# Patient Record
Sex: FEMALE | Race: WHITE | NOT HISPANIC OR LATINO | Employment: UNEMPLOYED | URBAN - METROPOLITAN AREA
[De-identification: names, ages, dates, MRNs, and addresses within clinical notes are randomized per-mention and may not be internally consistent; named-entity substitution may affect disease eponyms.]

---
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
## 2022-01-06 ENCOUNTER — SOCIAL WORK (OUTPATIENT)
Dept: BEHAVIORAL/MENTAL HEALTH CLINIC | Facility: CLINIC | Age: 22
End: 2022-01-06
Payer: COMMERCIAL

## 2022-01-06 DIAGNOSIS — F41.1 GENERALIZED ANXIETY DISORDER: ICD-10-CM

## 2022-01-06 DIAGNOSIS — F33.1 MODERATE EPISODE OF RECURRENT MAJOR DEPRESSIVE DISORDER (HCC): Primary | ICD-10-CM

## 2022-01-06 PROCEDURE — 90834 PSYTX W PT 45 MINUTES: CPT | Performed by: SOCIAL WORKER

## 2022-01-06 NOTE — BH TREATMENT PLAN
Simon Hay  2000         Date of Initial Treatment Plan: 5/12/2021   Date of Current Treatment Plan: 1/6/22     Treatment Plan Number 3      Strengths/Personal Resources for Self Care: good listener, empathetic to others, responsible       Diagnosis:   1  Moderate episode of recurrent major depressive disorder ([facility] [address] )      2  Anxiety disorder, unspecified type            Area of Needs: improving self-regard, improving ability to cope with depression and anxiety         Long Term Goal 1: "I just want to feel normal "     Target Date: 4/6/22  Completion Date: TBD         Short Term Objectives for Goal 1:               G  Severiano Gouty will explore self during therapy sessions  ( ongoing as of 1/6/22)              B  Severiano Gouty will identify changes to improve self-regard  (achieved, ongoing as of 1/6/22)              C  Severiano Gouty will implement changes to improve self-regard  (achieved, ongoing as of 1/6/22)     Long Term Goal 2: Severiano Gouty will utilize healthy coping skills to decrease depression and anxiety      Target Date: 4/6/22  Completion Date: TBD     Short Term Objectives for Goal 2:           A  Severiano Gouty will identify current methods of coping with depression and anxiety  (achieved as of 9/1/21)          B  Severiano Gouty will receive psycho-education on healthy coping skills  (achieved as of 1/6/22)          C  Severiano Gouty will implement learned skills to assist with decreasing depression and anxiety   (achieved as of 1/6/22; ongoing)     GOAL 1: Modality: Individual 4x per month   Completion Date TBD, Medication Management and The person(s) responsible for carrying out the plan is  James Miguel, Med Provider      GOAL 2: Modality: Individual 4x per month   Completion Date TBD, Medication Management and The person(s) responsible for carrying out the plan is  Myriam Therapist, Med Provider          [address] [place] [address]: Diagnosis and Treatment Plan explained to Myriam Miguel relates understanding diagnosis and is agreeable to Treatment Plan          Client Comments : Please share your thoughts, feelings, need and/or experiences regarding your treatment plan: "ok "    Treatment Plan done but not signed at time of office visit due to:  Plan reviewed by phone or in person  and verbal consent given due to Rich social chely

## 2022-01-06 NOTE — PSYCH
This note was not shared with the patient due to this is a psychotherapy note     Virtual Regular Visit    Verification of patient location:    Patient is located in the following state in which I hold an active license [place]      Assessment/Plan:    Problem List Items Addressed This Visit        Other    Anxiety disorder    Depression - Primary          Goals addressed in session: Goal 1 and Goal 2          Reason for visit is   Chief Complaint   Patient presents with    Virtual Regular Visit        Encounter provider Codey Jaramillo    Provider located at [address]  [address]  [place] [address]  [phone]      Recent Visits  No visits were found meeting these conditions  Showing recent visits within past 7 days and meeting all other requirements  Future Appointments  No visits were found meeting these conditions  Showing future appointments within next 150 days and meeting all other requirements       The patient was identified by name and date of birth  Corbin Crane was informed that this is a telemedicine visit and that the visit is being conducted through[employer] and patient was informed that this is a secure, HIPAA-compliant platform  She agrees to proceed     My office door was closed  No one else was in the room  She acknowledged consent and understanding of privacy and security of the video platform  The patient has agreed to participate and understands they can discontinue the visit at any time  Patient is aware this is a billable service  Psychotherapy Provided: Individual Psychotherapy 45 minutes     Length of time in session: 45 minutes, follow up in 1 week    Encounter Diagnosis     ICD-10-CM    1  Moderate episode of recurrent major depressive disorder (HCC)  F33 1    2   Generalized anxiety disorder  F41 1        Goals addressed in session: Goal 1 and Goal 2     Data: Sonido Tenorio reported that she spent a few days visiting her Aunt last week and enjoyed her time away from home  Brionna Reyess discussed some stress with friends about new year's dimitri  Brionna Majors reported that she has been slightly anxious about the upcoming semester of school, but believes she is managing the stress well  Brionna Laura discussed various thoughts and feelings related to family life, friendships and view of self  Writer actively listened as Brionna Keya shared and assisted with processing thoughts and feelings  Writer and Brionna Laura reviewed treatment plan for update  Brionnayoselin Laura was able to recognize progress toward goals but wants to continue with these goals in order to secure maintained progress  Assessment: Brionna Keya presented in a neutral mood with congruent affect  Brionna Keya was well engaged and oriented X4  Depression symptom present is irritability  Some anxiety present  Successful use of healthy coping skills  Thought content normal  Insight and judgement intact  SI/HI not reported or observed  Plan: Continue with individual psychotherapy  Current suicide risk : Low     Behavioral Health Treatment Plan [facility]: Diagnosis and Treatment Plan explained to Anayeli Jean relates understanding diagnosis and is agreeable to Treatment Plan  Yes     I spent 45 minutes directly with the patient during this visit    VIRTUAL VISIT [address] verbally agrees to participate in [employer]  Pt is aware that Virtual Care Services could be limited without vital signs or the ability to perform a full hands-on physical Chas Rader understands she or the provider may request at any time to terminate the video visit and request the patient to seek care or treatment in person

## 2022-01-13 ENCOUNTER — SOCIAL WORK (OUTPATIENT)
Dept: BEHAVIORAL/MENTAL HEALTH CLINIC | Facility: CLINIC | Age: 22
End: 2022-01-13
Payer: COMMERCIAL

## 2022-01-13 DIAGNOSIS — F33.1 MODERATE EPISODE OF RECURRENT MAJOR DEPRESSIVE DISORDER (HCC): Primary | ICD-10-CM

## 2022-01-13 DIAGNOSIS — F41.1 GENERALIZED ANXIETY DISORDER: ICD-10-CM

## 2022-01-13 PROCEDURE — 90834 PSYTX W PT 45 MINUTES: CPT | Performed by: SOCIAL WORKER

## 2022-01-13 NOTE — PSYCH
This note was not shared with the patient due to this is a psychotherapy note    Virtual Regular Visit    Verification of patient location:    Patient is located in the following state in which I hold an active license [place]      Assessment/Plan:    Problem List Items Addressed This Visit        Other    Anxiety disorder    Depression - Primary          Goals addressed in session: Goal 1 and Goal 2          Reason for visit is   Chief Complaint   Patient presents with    Virtual Regular Visit        Encounter provider Jordyn Carvajal    Provider located at [address]  [address]  [place] [address]  [phone]      Recent Visits  No visits were found meeting these conditions  Showing recent visits within past 7 days and meeting all other requirements  Future Appointments  No visits were found meeting these conditions  Showing future appointments within next 150 days and meeting all other requirements       The patient was identified by name and date of birth  Rosa Peters was informed that this is a telemedicine visit and that the visit is being conducted through[employer] and patient was informed that this is a secure, HIPAA-compliant platform  She agrees to proceed     My office door was closed  No one else was in the room  She acknowledged consent and understanding of privacy and security of the video platform  The patient has agreed to participate and understands they can discontinue the visit at any time  Patient is aware this is a billable service  Psychotherapy Provided: Individual Psychotherapy 45 minutes     Length of time in session: 45 minutes, follow up in 1 week    Encounter Diagnosis     ICD-10-CM    1  Moderate episode of recurrent major depressive disorder (HCC)  F33 1    2   Generalized anxiety disorder  F41 1        Goals addressed in session: Goal 1 and Goal 2     Data: Don Prude reported that she met with her doctor regarding her knee and learned that she will likely need surgery  Patric Chavez reported that her doctor informed her that he is retiring and referred her to a new doctor  Patric Chavez discussed her thoughts and feelings regarding this news which she identified as a source of some stress and anxiety  Patric Chavez went on to report that she has not been working and is unsure if she will need to take a leave of absence due to her knee pain  Patric Chavez reported that she has been feeling somewhat depressed as she has been home a lot and feeling isolated  Patric Chavez discussed feeling anxiety when thinking about going out after spending so much time at home  Writer actively listened as Patric Chavez shared and assisted by exploring and processing thoughts and feelings  Writer and Patric Chavez discussed the value of staying in the present, positive self-talk and use of self-care and healthy coping to decrease stress  Assessment: Patric Chavez presented in a neutral mood with congruent affect  Patric Chavez was well engaged and oriented X4  Depression and anxiety symptoms are current increased due to recent stressors  Patric Chavez is actively utilizing healthy coping  Thought content was normal with some minor distorted cognitions  Insight and judgement are intact  SI/HI not reported or observed  Plan: Continue with individual psychotherapy  Current suicide risk : Low     Behavioral Health Treatment Plan [facility]: Diagnosis and Treatment Plan explained to Merlin Roman relates understanding diagnosis and is agreeable to Treatment Plan  Yes     I spent 45 minutes directly with the patient during this visit    VIRTUAL VISIT [address] verbally agrees to participate in [employer]   Pt is aware that Virtual Care Services could be limited without vital signs or the ability to perform a full hands-on physical Vallerie Aid understands she or the provider may request at any time to terminate the video visit and request the patient to seek care or treatment in person

## 2022-01-20 ENCOUNTER — TELEMEDICINE (OUTPATIENT)
Dept: BEHAVIORAL/MENTAL HEALTH CLINIC | Facility: CLINIC | Age: 22
End: 2022-01-20
Payer: COMMERCIAL

## 2022-01-20 DIAGNOSIS — F33.1 MODERATE EPISODE OF RECURRENT MAJOR DEPRESSIVE DISORDER (HCC): Primary | ICD-10-CM

## 2022-01-20 DIAGNOSIS — F41.1 GENERALIZED ANXIETY DISORDER: ICD-10-CM

## 2022-01-20 PROCEDURE — 90834 PSYTX W PT 45 MINUTES: CPT | Performed by: SOCIAL WORKER

## 2022-01-20 NOTE — PSYCH
This note was not shared with the patient due to this is a psychotherapy note    Virtual Regular Visit    Verification of patient location:    Patient is located in the following state in which I hold an active license [place]      Assessment/Plan:    Problem List Items Addressed This Visit        Other    Anxiety disorder    Depression - Primary          Goals addressed in session: Goal 1          Reason for visit is   Chief Complaint   Patient presents with    Virtual Regular Visit        Encounter provider Maxx Moreno    Provider located at [address]  [address]  [place] [address]  [phone]      Recent Visits  No visits were found meeting these conditions  Showing recent visits within past 7 days and meeting all other requirements  Today's Visits  Date Type Provider Dept   01/20/22 Telemedicine Maxx Moreno [facility]   Showing today's visits and meeting all other requirements  Future Appointments  No visits were found meeting these conditions  Showing future appointments within next 150 days and meeting all other requirements       The patient was identified by name and date of birth  Jenifer Singh was informed that this is a telemedicine visit and that the [facility] and patient was informed that this is a secure, HIPAA-compliant platform  She agrees to proceed     My office door was closed  No one else was in the room  She acknowledged consent and understanding of privacy and security of the video platform  The patient has agreed to participate and understands they can discontinue the visit at any time  Patient is aware this is a billable service  Psychotherapy Provided: Individual Psychotherapy 45 minutes     Length of time in session: 45 minutes, follow up in 1 week    Encounter Diagnosis     ICD-10-CM    1   Moderate episode of recurrent major depressive disorder ([facility] [address] )  F33 1    2  Generalized anxiety disorder  F41 1        Goals addressed in session: Goal 1     Data: Letty Esquivel reported that she has been feeling isolated as she has not been out of her house much due to her knee injury  Letty Esquivel reported that she has not been to work as her doctor put her on light duty and her job is not able to accommodate light duty  Letty Esquivel reported that she has been considering trying to work short shifts, but expressed ambivalence toward this idea as she is unsure of how it will impact her physically  Letty Esquivel identified feeling challenged with decision making regarding work  Writer spent time exploring this with Letty Esquivel, identifying options and assisting her to work through the decision making process  Writer identified the need for communication from Letty Esquivel to her employer and possibly her doctor to be able to make an informed decision  Letty Esquivel went on to identify some feelings of depression, stating that she feels "stuck " Writer actively listened and emotionally validated Myriam  Writer challenged irrational and/or distorted thoughts and offered Myriam new perspective  Writer asked Letty Esquivel to utilize decision making skills to make a decision about work and to do at least one thing that she would enjoy doing by next session Bj Sanchez agreed  Assessment: Letty Esquivel presented in a neutral, slightly depressed, mood with congruent affect  Letty Esquivel was well engaged and oriented X4  Letty Esquivel identifies barriers and underlying causes to depression but is ambivalent to make change  Thought content was normal with some irrational thoughts  Insight and judgement are intact  SI/HI not reported or observed  Plan: Continue with individual psychotherapy       Current suicide risk : Low     Behavioral Health Treatment Plan [facility]: Diagnosis and Treatment Plan explained to Tamara Yin relates understanding diagnosis and is agreeable to Treatment Plan  Yes     I spent 45 minutes directly with the patient during this visit    VIRTUAL VISIT [address] verbally agrees to participate in [employer]  Pt is aware that Virtual Care Services could be limited without vital signs or the ability to perform a full hands-on physical Army Bigness understands she or the provider may request at any time to terminate the video visit and request the patient to seek care or treatment in person

## 2022-01-26 DIAGNOSIS — F41.9 ANXIETY: ICD-10-CM

## 2022-01-26 RX ORDER — ZOLPIDEM TARTRATE 10 MG/1
10 TABLET ORAL
Qty: 30 TABLET | Refills: 0 | Status: SHIPPED | OUTPATIENT
Start: 2022-01-26 | End: 2022-02-22 | Stop reason: SDUPTHER

## 2022-01-27 ENCOUNTER — TELEMEDICINE (OUTPATIENT)
Dept: BEHAVIORAL/MENTAL HEALTH CLINIC | Facility: CLINIC | Age: 22
End: 2022-01-27
Payer: COMMERCIAL

## 2022-01-27 DIAGNOSIS — F33.1 MODERATE EPISODE OF RECURRENT MAJOR DEPRESSIVE DISORDER (HCC): Primary | ICD-10-CM

## 2022-01-27 DIAGNOSIS — F41.1 GENERALIZED ANXIETY DISORDER: ICD-10-CM

## 2022-01-27 PROCEDURE — 90834 PSYTX W PT 45 MINUTES: CPT | Performed by: SOCIAL WORKER

## 2022-01-27 NOTE — PSYCH
This note was not shared with the patient due to this is a psychotherapy note    Virtual Regular Visit    Verification of patient location:    Patient is located in the following state in which I hold an active license [place]      Assessment/Plan:    Problem List Items Addressed This Visit        Other    Anxiety disorder    Depression - Primary          Goals addressed in session: Goal 1 and Goal 2          Reason for visit is   Chief Complaint   Patient presents with    Virtual Regular Visit        Encounter provider Mena [employer]    Provider located at [address] [place] [address]  [address] [place] [address]  [phone]      Recent Visits  Date Type Provider Dept   01/20/22 [address] [place] [address] [facility]   Showing recent visits within past 7 days and meeting all other requirements  Today's Visits  Date Type Provider Dept   01/27/22 Telemedicine Mena [employer]  [facility]   Showing today's visits and meeting all other requirements  Future Appointments  No visits were found meeting these conditions  Showing future appointments within next 150 days and meeting all other requirements       The patient was identified by name and date of birth  Corrine Cortez was informed that this is a telemedicine visit and that the visit is being conducted through[facility] and patient was informed that this is a secure, HIPAA-compliant platform  She agrees to proceed     My office door was closed  No one else was in the room  She acknowledged consent and understanding of privacy and security of the video platform  The patient has agreed to participate and understands they can discontinue the visit at any time  Patient is aware this is a billable service       Psychotherapy Provided: Individual Psychotherapy 45 minutes     Length of time in session: 45 minutes, follow up in 1 week    Encounter Diagnosis     ICD-10-CM    1  Moderate episode of recurrent major depressive disorder (HCC)  F33 1    2  Generalized anxiety disorder  F41 1        Goals addressed in session: Goal 1 and Goal 2     Data: Jes Payan reported that she has started all classes for her second semester at school and is feeling somewhat overwhelmed as she is taking more classes this semester than she has in the past  Jesfrancie Payan discussed her efforts to organize and utilize time management  Jes Payan reported that she has been proactive with assignments to decrease stress  Writer praised Jes Payan for her efforts  Jes Payan went on to discuss feelings of loneliness  Jes Payan discussed difficulty making friends with peers at school, as well as the challenges with spending time with her good friend due to differences in schedule  Writer and Jes Payan discussed ways to occupy her time to assist with preventing feelings of loneliness  Writer and Jes Payan discussed use of self-care to manage feelings of irritability due to stress from school  Writer encouraged continued effort toward treatment goals  Assessment: Jes Payan presented in a neutral mood with congruent affect  Jes Payan was well engaged and oriented X4  Jes Payan is experiencing increased irritability due to stress  Jes Payan is motivated to utilize healthy coping skills and self-care  Thought content was normal  Insight and judgement were intact  SI/HI not reported or observed  Plan: Continue with individual psychotherapy  Current suicide risk : Low     Behavioral Health Treatment Plan [facility]: Diagnosis and Treatment Plan explained to Julius David relates understanding diagnosis and is agreeable to Treatment Plan  Yes     I spent 45 minutes directly with the patient during this visit    VIRTUAL VISIT [address] verbally agrees to participate in [employer]   Pt is aware that Virtual Care Services could be limited without vital signs or the ability to perform a full hands-on physical Poli Pap understands she or the provider may request at any time to terminate the video visit and request the patient to seek care or treatment in person

## 2022-01-28 DIAGNOSIS — F33.1 MODERATE EPISODE OF RECURRENT MAJOR DEPRESSIVE DISORDER (HCC): ICD-10-CM

## 2022-01-28 RX ORDER — BUPROPION HYDROCHLORIDE 150 MG/1
150 TABLET ORAL DAILY
Qty: 30 TABLET | Refills: 3 | Status: SHIPPED | OUTPATIENT
Start: 2022-01-28 | End: 2022-05-23 | Stop reason: ALTCHOICE

## 2022-02-03 ENCOUNTER — TELEMEDICINE (OUTPATIENT)
Dept: BEHAVIORAL/MENTAL HEALTH CLINIC | Facility: CLINIC | Age: 22
End: 2022-02-03
Payer: COMMERCIAL

## 2022-02-03 DIAGNOSIS — F33.1 MODERATE EPISODE OF RECURRENT MAJOR DEPRESSIVE DISORDER (HCC): Primary | ICD-10-CM

## 2022-02-03 DIAGNOSIS — F41.1 GENERALIZED ANXIETY DISORDER: ICD-10-CM

## 2022-02-03 PROCEDURE — 90834 PSYTX W PT 45 MINUTES: CPT | Performed by: SOCIAL WORKER

## 2022-02-03 NOTE — PSYCH
This note was not shared with the patient due to this is a psychotherapy note    Virtual Regular Visit    Verification of patient location:    Patient is located in the following state in which I hold an active license [place]      Assessment/Plan:    Problem List Items Addressed This Visit        Other    Anxiety disorder    Depression - Primary          Goals addressed in session: Goal 1 and Goal 2          Reason for visit is   Chief Complaint   Patient presents with    Virtual Regular Visit        Encounter provider Samanta Victoria    Provider located at [address] [place] [address]  [address]  [place] [address]  [phone]      Recent Visits  Date Type Provider Dept   01/27/22 [address] [place] [address] [facility]   Showing recent visits within past 7 days and meeting all other requirements  Today's Visits  Date Type Provider Dept   02/03/22 Telemedicine Samanta Victoria  [facility]   Showing today's visits and meeting all other requirements  Future Appointments  No visits were found meeting these conditions  Showing future appointments within next 150 days and meeting all other requirements       The patient was identified by name and date of birth  Marty Ashford was informed that this is a telemedicine visit and that the visit is being conducted through[employer] and patient was informed that this is a secure, HIPAA-compliant platform  She agrees to proceed     My office door was closed  No one else was in the room  She acknowledged consent and understanding of privacy and security of the video platform  The patient has agreed to participate and understands they can discontinue the visit at any time  Patient is aware this is a billable service       Psychotherapy Provided: Individual Psychotherapy 45 minutes     Length of time in session: 45 minutes, follow up in 1 week    Encounter Diagnosis     ICD-10-CM    1  Moderate episode of recurrent major depressive disorder (HCC)  F33 1    2  Generalized anxiety disorder  F41 1        Goals addressed in session: Goal 1 and Goal 2     Data: Barbara Gallardo reported feeling depressed over the last week, likely due to challenges with school  Barbara Gallardo shared about some assignments that she did not do well on over the past week  Barbara Gallardo identified how this has caused her mood to feel low and unmotivated  Writer spent time exploring these challenges with Barbara Gallardo and how she can overcome them  Writer acknowledged the ease with which she could allow these challenges to become barriers and how to navigate through that with her thoughts and feelings  Barbara Gallardo went on to discuss thoughts and feelings related to the possibility of having to return to work soon  Barbara Gallardo reported feeling overwhelmed at the thought  Writer assisted Myriam with processing her thoughts and feelings and challenging distorted cognitions  Writer discussed the importance of balance and self-care, as well as accepting help from others when needed  Barbara Gallardo did report effort to cope by spending time with a friend recently  Writer praised Barbara Gallardo and encouraged continued effort toward healthy coping and self-care  Assessment: Barbara Gallardo presented in a depressed mood with congruent affect  Barbara Gallardo was well engaged and oriented X4  Increased stress has increased depressive symptoms for Myriam Gallardo offers healthy insight and self-awareness  Barbara Gallardo responds well to thought challenging and encouragement  SI/HI not reported or observed  Plan: Continue with individual psychotherapy  Current suicide risk : Low     Behavioral Health Treatment Plan [facility]: Diagnosis and Treatment Plan explained to Brenda Moore relates understanding diagnosis and is agreeable to Treatment Plan   Yes     I spent 45 minutes directly with the patient during this visit    VIRTUAL VISIT DISCLAIMER    Luis Felipe Keys verbally agrees to participate in [employer]  Pt is aware that Virtual Care Services could be limited without vital signs or the ability to perform a full hands-on physical Keyla Lopez understands she or the provider may request at any time to terminate the video visit and request the patient to seek care or treatment in person

## 2022-02-09 ENCOUNTER — TELEPHONE (OUTPATIENT)
Dept: PSYCHIATRY | Facility: CLINIC | Age: 22
End: 2022-02-09

## 2022-02-09 NOTE — TELEPHONE ENCOUNTER
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 [address] [place], [address] appt 2/22/22

## 2022-02-10 ENCOUNTER — TELEMEDICINE (OUTPATIENT)
Dept: BEHAVIORAL/MENTAL HEALTH CLINIC | Facility: CLINIC | Age: 22
End: 2022-02-10
Payer: COMMERCIAL

## 2022-02-10 DIAGNOSIS — F41.1 GENERALIZED ANXIETY DISORDER: ICD-10-CM

## 2022-02-10 DIAGNOSIS — F33.1 MODERATE EPISODE OF RECURRENT MAJOR DEPRESSIVE DISORDER (HCC): Primary | ICD-10-CM

## 2022-02-10 PROCEDURE — 90834 PSYTX W PT 45 MINUTES: CPT | Performed by: SOCIAL WORKER

## 2022-02-10 NOTE — PSYCH
This note was not shared with the patient due to this is a psychotherapy note    Virtual Regular Visit    Verification of patient location:    Patient is located in the following state in which I hold an active license [place]      Assessment/Plan:    Problem List Items Addressed This Visit        Other    Anxiety disorder    Depression - Primary          Goals addressed in session: Goal 1 and Goal 2          Reason for visit is   Chief Complaint   Patient presents with    Virtual Regular Visit        Encounter provider Lauren Catherine    Provider located at [address] [place] [address]  [address]  [place] [address]  [phone]      Recent Visits  Date Type Provider Dept   02/03/22 [address] [place] [address] [facility]   Showing recent visits within past 7 days and meeting all other requirements  Today's Visits  Date Type Provider Dept   02/10/22 Telemedicine Lauren Catherine [facility]   Showing today's visits and meeting all other requirements  Future Appointments  No visits were found meeting these conditions  Showing future appointments within next 150 days and meeting all other requirements       The patient was identified by name and date of birth  Kayleigh Arts was informed that this is a telemedicine visit and that the visit is being conducted [facility] and patient was informed that this is a secure, HIPAA-compliant platform  She agrees to proceed     My office door was closed  No one else was in the room  She acknowledged consent and understanding of privacy and security of the video platform  The patient has agreed to participate and understands they can discontinue the visit at any time  Patient is aware this is a billable service       Psychotherapy Provided: Individual Psychotherapy 45 minutes     Length of time in session: 45 minutes, follow up in 1 week    Encounter Diagnosis     ICD-10-CM    1  Moderate episode of recurrent major depressive disorder (HCC)  F33 1    2  Generalized anxiety disorder  F41 1        Goals addressed in session: Goal 1 and Goal 2     Data: Severiano Gouty reported that she has been experiencing increased depressive symptoms  Severiano Gouty reported feeling overwhelmed by school and very unmotivated  Severiano Gouty discussed some challenges with school  Severiano Gouty went on to share about days when depression is very significant, to the point that she begins to experience thoughts of wanting to self-harm and/or suicidal ideation  Severiano Gouty provided examples of desire to cut and thoughts of taking her whole bottle of pills  Severiano Gouty reported that when this happens, she will go talk to her mother as her mother is a significant support  Severiano Gouty reported that while she has the thoughts, she has no intent to act and feels in control of her actions  Severiano Gouty reported that she also uses distraction to help cope  Severiano Gouty reported that this happens 2-3 times a week  Writer acknowledged concern regarding Myriam's report and spent time exploring and processing Myriam's thoughts and feelings  Severiano Gouty reported taking medication as prescribed and stated that she has an appointment with Dr Jeannie Malin next week; Writer encouraged Severiano Gouty to speak to Dr Jeannie Malin about the severity of her depression  Writer verbally safety planned with Severiano Gouty  Writer also reiterated use of healthy coping skills and her support system  Assessment: Severiano Gouty presented in a depressed mood with congruent affect  Severiano Gouty was well engaged and oriented X4  Depression symptoms have increased and are presenting as severe at this time (low motivation, loss of pleasure, depressed mood, thoughts of self-harm/suicidal ideation)  Thought content was observed to be normal during session  Writer observes Severiano Gouty to have a good sense of self-awareness and impulse control   Passive SI reported  HI not reported or observed  Plan: Continue with individual psychotherapy  Current suicide risk : Low     Behavioral Health Treatment Plan [facility]: Diagnosis and Treatment Plan explained to Jet Salazar relates understanding diagnosis and is agreeable to Treatment Plan  Yes     I spent 45 minutes directly with the patient during this visit    VIRTUAL VISIT [address] verbally agrees to participate in [facility]  Pt is aware that Virtual Care Services could be limited without vital signs or the ability to perform a full hands-on physical Sadiq Shah understands she or the provider may request at any time to terminate the video visit and request the patient to seek care or treatment in person

## 2022-02-17 ENCOUNTER — TELEPHONE (OUTPATIENT)
Dept: BEHAVIORAL/MENTAL HEALTH CLINIC | Facility: CLINIC | Age: 22
End: 2022-02-17

## 2022-02-22 ENCOUNTER — TELEMEDICINE (OUTPATIENT)
Dept: PSYCHIATRY | Facility: CLINIC | Age: 22
End: 2022-02-22
Payer: COMMERCIAL

## 2022-02-22 DIAGNOSIS — F41.1 GENERALIZED ANXIETY DISORDER: ICD-10-CM

## 2022-02-22 DIAGNOSIS — F41.9 ANXIETY: ICD-10-CM

## 2022-02-22 DIAGNOSIS — R45.86 MOOD SWINGS: Primary | ICD-10-CM

## 2022-02-22 DIAGNOSIS — F42.4 DERMATILLOMANIA: ICD-10-CM

## 2022-02-22 DIAGNOSIS — F33.1 MODERATE EPISODE OF RECURRENT MAJOR DEPRESSIVE DISORDER (HCC): ICD-10-CM

## 2022-02-22 PROCEDURE — 90833 PSYTX W PT W E/M 30 MIN: CPT | Performed by: NURSE PRACTITIONER

## 2022-02-22 PROCEDURE — 99214 OFFICE O/P EST MOD 30 MIN: CPT | Performed by: NURSE PRACTITIONER

## 2022-02-22 RX ORDER — ZOLPIDEM TARTRATE 10 MG/1
10 TABLET ORAL
Qty: 30 TABLET | Refills: 0 | Status: SHIPPED | OUTPATIENT
Start: 2022-02-22 | End: 2022-03-21

## 2022-02-22 NOTE — PSYCH
Virtual Regular Visit    Problem List Items Addressed This Visit     None      Visit Diagnoses     Mood swings    -  Primary    Relevant Medications    Brexpiprazole (Rexulti) 2 MG tablet    Anxiety        Relevant Medications    zolpidem (AMBIEN) 10 mg tablet        Reason for visit is   Chief Complaint   Patient presents with    Anxiety    Depression    Mood Swings    Medication Management     Encounter provider Sam Barajas, PhD    Provider located at [address] [place] [address]  [address]  [place] [address]  [phone]    Recent Visits  No visits were found meeting these conditions  Showing recent visits within past 7 days and meeting all other requirements  Today's Visits  Date Type Provider Dept   02/22/22 Telemedicine Sam Barajas, PhD [facility] [place]   Showing today's visits and meeting all other requirements  Future Appointments  No visits were found meeting these conditions  Showing future appointments within next 150 days and meeting all other requirements       After connecting through [employer], the patient was identified by name and date of birth  Maryan Carroll was informed that this is a telemedicine visit and that the visit is being conducted through [address] [place] [address] Now and patient was informed that this is a secure, HIPAA-compliant platform  She agrees to proceed  which may not be secure and therefore, might not be HIPAA-compliant  My office door was closed  No one else was in the room  She acknowledged consent and understanding of privacy and security of the video platform  The patient has agreed to participate and understands they can discontinue the visit at any time  SUBJECTIVE:    Maryan Carroll is a 25 y o  female with a history of mood swings, anxiety, depression seen for medication management and mood evaluation  Roney reports needing knee and ACL surgery, has pain in leg   She is eating and sleeping  Reports "spurts of depression" denies triggers then mood changes to "normal" She distracts herself and this helps sometimes  Roney is social and has friends who are supportive, parents are supportive  She is achieving As and Bs in school  One stressor is her poor self esteem  Denies SI; however, has self harm thoughts, but has not acted upon them  She attends therapy weekly  Takes medication as prescribed and family are supportive      HPI ROS Appetite Changes and Sleep: normal appetite and normal energy level    Review Of Systems:     Mood Anxiety and Depression   Behavior Normal    Thought Content Disturbing Thoughts, Feelings   General Emotional Problems and Sleep Disturbances   Personality Normal   Other Psych Symptoms Normal   Constitutional As Noted in HPI   ENT As Noted in HPI   Cardiovascular As Noted in HPI   Respiratory As Noted in HPI   Gastrointestinal As Noted in HPI   Genitourinary As Noted in HPI   Musculoskeletal As Noted in HPI   Integumentary As Noted in HPI   Neurological As Noted in HPI   Endocrine Normal    Other Symptoms Normal        Substance Abuse History:    Social History     Substance and Sexual Activity   Drug Use No       Family Psychiatric History:     Family History   Problem Relation Age of Onset    Anxiety disorder Mother     Depression Mother     Asthma Mother     Crohn's disease Mother     Bipolar disorder Mother     Cervical cancer Mother     Hypertension Maternal Grandmother     Depression Maternal Grandfather     Breast cancer Family        Social History     Socioeconomic History    Marital status: Single     Spouse name: Not on file    Number of children: Not on file    Years of education: Not on file    Highest education level: Not on file   Occupational History    Not on file   Tobacco Use    Smoking status: Never Smoker    Smokeless tobacco: Never Used    Tobacco comment: vape daily   Vaping Use    Vaping Use: Every day    Substances: Nicotine, Flavoring   Substance and Sexual Activity    Alcohol use: No    Drug use: No    Sexual activity: Not on file   Other Topics Concern    Not on file   Social History Narrative    12th grade     Social Determinants of Health     Financial Resource Strain: Not on file   Food Insecurity: Not on file   Transportation Needs: Not on file   Physical Activity: Not on file   Stress: Not on file   Social Connections: Not on file   Intimate Partner Violence: Not on file   Housing Stability: Not on file       Past Medical History:   Diagnosis Date    Anxiety     Asthma     Depression     High triglycerides     total triglyceride 187 repeat normal 70    Psychiatric disorder        Past Surgical History:   Procedure Laterality Date    KNEE ARTHROSCOPY Right 2015    KNEE ARTHROSCOPY Left 2013    KNEE ARTHROSCOPY Left 2017    KNEE SURGERY      UPPER GASTROINTESTINAL ENDOSCOPY         Current Outpatient Medications   Medication Sig Dispense Refill    albuterol (PROAIR HFA) 90 mcg/act inhaler Inhale 1-2 puffs as needed  (Patient not taking: Reported on 12/21/2021 )      Blood Glucose Monitoring Suppl (OneTouch Verio) w/Device KIT Use to test blood sugar three times a day (Patient not taking: Reported on 12/21/2021 ) 1 kit 0    Brexpiprazole (Rexulti) 2 MG tablet Take 1 tablet (2 mg total) by mouth daily 30 tablet 3    buPROPion (WELLBUTRIN XL) 150 mg 24 hr tablet Take 1 tablet (150 mg total) by mouth daily 30 tablet 3    esomeprazole (NexIUM) 20 mg capsule Take 1 capsule (20 mg total) by mouth daily in the early morning 30 capsule 9    glucose blood (OneTouch Verio) test strip Test blood sugar three times a day (Patient not taking: Reported on 12/21/2021 ) 300 each 3    Lancets (onetouch ultrasoft) lancets Test blood sugar three times a day (Patient not taking: Reported on 12/21/2021 ) 300 each 3    meloxicam (MOBIC) 15 mg tablet       norgestimate-ethinyl estradiol (ORTHO-CYCLEN) 0 25-35 MG-MCG per tablet Take 1 tablet by mouth daily      zolpidem (AMBIEN) 10 mg tablet Take 1 tablet (10 mg total) by mouth daily at bedtime as needed for sleep 30 tablet 0     No current facility-administered medications for this visit  No Known Allergies    The following portions of the patient's history were reviewed and updated as appropriate: allergies, current medications, past family history, past medical history, past social history, past surgical history and problem list     OBJECTIVE:     Mental Status Examination:    Appearance calm and cooperative , adequate hygiene and grooming and good eye contact    Mood depressed and mood appropriate   Affect affect appropriate    Speech a normal rate   Thought Processes normal thought processes   Hallucinations no hallucinations present    Thought Content no delusions   Abnormal Thoughts no suicidal thoughts  and no homicidal thoughts    Orientation  oriented to person and place and time   Remote Memory short term memory intact and long term memory intact   Attention Span concentration intact   Intellect Appears to be of Average Intelligence   Insight Insight intact   Judgement judgment was intact   Muscle Strength Muscle strength and tone were normal   Language no difficulty naming common objects   Fund of Knowledge displays adequate knowledge of current events   Pain moderate to severe   Pain Scale 5       Laboratory Results: No results found for this or any previous visit  Assessment/Plan:       Diagnoses and all orders for this visit:    Mood swings  -     Brexpiprazole (Rexulti) 2 MG tablet; Take 1 tablet (2 mg total) by mouth daily    Anxiety  -     zolpidem (AMBIEN) 10 mg tablet;  Take 1 tablet (10 mg total) by mouth daily at bedtime as needed for sleep          Treatment Recommendations- Risks Benefits      Immediate Medical/Psychiatric/Psychotherapy Treatments and Any Precautions:  reviewed medication continue with treatment plan, increase Rexulti to 2 mg she agreed    Risks, Benefits And Possible Side Effects Of Medications:  {PSYCH RISK, BENEFITS AND POSSIBLE SIDE EFFECTS (Optional):08471    Controlled Medication Discussion: she is aware of safe use and storage of medication     Psychotherapy Provided: 19 min  Supportive therapy  Coping skills  Medication education  Mood assessment    Goals discussed in session: reduce depression       Treatment Plan:    Completed and signed during the session: Not applicable - Treatment Plan not due at this session enacted 5/10/2021, updated 11/9/2021        Josseline Subramanian, PhD 02/22/22

## 2022-02-24 ENCOUNTER — TELEMEDICINE (OUTPATIENT)
Dept: BEHAVIORAL/MENTAL HEALTH CLINIC | Facility: CLINIC | Age: 22
End: 2022-02-24
Payer: COMMERCIAL

## 2022-02-24 DIAGNOSIS — F33.1 MODERATE EPISODE OF RECURRENT MAJOR DEPRESSIVE DISORDER (HCC): Primary | ICD-10-CM

## 2022-02-24 DIAGNOSIS — F41.1 GENERALIZED ANXIETY DISORDER: ICD-10-CM

## 2022-02-24 PROCEDURE — 90834 PSYTX W PT 45 MINUTES: CPT | Performed by: SOCIAL WORKER

## 2022-02-24 NOTE — PSYCH
This note was not shared with the patient due to this is a psychotherapy note     Psychotherapy Provided: Individual Psychotherapy 45 minutes     Length of time in session: 45 minutes, follow up in 2 week    Encounter Diagnosis     ICD-10-CM    1  Moderate episode of recurrent major depressive disorder (HCC)  F33 1    2  Generalized anxiety disorder  F41 1        Goals addressed in session: Goal 1 and Goal 2     Data: Sonido Tenorio reported that she does have to have knee surgery which is scheduled for next week  Sonido Tenorio reported that she is unsure of what exactly will be done as the doctor will not know specifics until he operates  Sonido Tenorio reported that she does not feel too worried as she has had knee surgery before, but is slightly concerned about what all will need to be done during the surgery  Sonido Luzjoyce reported that she will be missing school for about a week and plans to contact her professors to learn how she can keep up with assignments while out  Sonido Tenorio reported that she is managing well with school, has a lot of homework, but is keeping up  Sonido Tenorio reported that medication was increased by Dr Bruno Valdivia and she is hopeful that this will help decrease depression and mood swings  Sonido Coby reported that she has some "crippling" days during which she experiences severe depression  Sonido Tenorio discussed feelings of frustration when this happens because she does not "want" to feel that way  Sonido Tenorio reported that she continues to have passive thoughts of self-harming which she is able to control  Sonido Tenorio reported use of distraction to assist with intrusive thoughts  Sonido Tenorio reported that she has noticed she will be happy and in a second her mood changes to depressed  Sonido Tenorio discussed this in detail  Sonido Tenorio also discussed having a negative view of her physical appearance  Writer spent time exploring and processing thoughts and feelings   and Sonido Tenorio discussed self-care and use of healthy coping skills  Writer and Sonido Tenorio discussed specific interventions to address negative thoughts  Assessment: Sonido Tenorio presented in a neutral mood with congruent affect  Sonido Tenorio was well engaged and oriented X4  Sonido Tenorio has started taking the increased dosage of medication this week  Depression symptoms, anxiety and mood swings occur daily based on Myriam's report  Thought content was normal, insight and judgement were intact  SI/HI not reported or observed  Plan: Continue with individual psychotherapy  Current suicide risk : Low     Behavioral Health Treatment Plan [facility]: Diagnosis and Treatment Plan explained to Edradha Harris relates understanding diagnosis and is agreeable to Treatment Plan   Yes

## 2022-03-10 ENCOUNTER — TELEMEDICINE (OUTPATIENT)
Dept: BEHAVIORAL/MENTAL HEALTH CLINIC | Facility: CLINIC | Age: 22
End: 2022-03-10
Payer: COMMERCIAL

## 2022-03-10 DIAGNOSIS — F41.1 GENERALIZED ANXIETY DISORDER: ICD-10-CM

## 2022-03-10 DIAGNOSIS — F33.1 MODERATE EPISODE OF RECURRENT MAJOR DEPRESSIVE DISORDER (HCC): Primary | ICD-10-CM

## 2022-03-10 PROCEDURE — 90834 PSYTX W PT 45 MINUTES: CPT | Performed by: SOCIAL WORKER

## 2022-03-10 NOTE — PSYCH
This note was not shared with the patient due to this is a psychotherapy note    Virtual Regular Visit    Verification of patient location:    Patient is located in the following state in which I hold an active license [place]      Assessment/Plan:    Problem List Items Addressed This Visit        Other    Anxiety disorder    Depression - Primary          Goals addressed in session: Goal 1          Reason for visit is   Chief Complaint   Patient presents with    Virtual Regular Visit        Encounter provider Kim Ross    Provider located at [address]  [address]  [place] [address]  [phone]      Recent Visits  No visits were found meeting these conditions  Showing recent visits within past 7 days and meeting all other requirements  Today's Visits  Date Type Provider Dept   03/10/22 [address] [place] [address] [facility]   Showing today's visits and meeting all other requirements  Future Appointments  No visits were found meeting these conditions  Showing future appointments within next 150 days and meeting all other requirements       The patient was identified by name and date of birth  Gerber Erm was informed that this is a telemedicine visit and that the [facility] and patient was informed that this is a secure, HIPAA-compliant platform  She agrees to proceed     My office door was closed  No one else was in the room  She acknowledged consent and understanding of privacy and security of the video platform  The patient has agreed to participate and understands they can discontinue the visit at any time  Patient is aware this is a billable service  Psychotherapy Provided: Individual Psychotherapy 45 minutes     Length of time in session: 45 minutes, follow up in 1 week    Encounter Diagnosis     ICD-10-CM    1   Moderate episode of recurrent major depressive disorder ([facility] [address] )  F33 1    2  Generalized anxiety disorder  F41 1        Goals addressed in session: Goal 1     Data: Sarahvanessagabriela Wise reported that she had knee surgery last week and she is now recovering  Pam Yousifamanda reported that she learned that she will need another, more significant surgery in the near future which causes her stress  Pam Wise reported that she is currently healing well and has been motivated toward physical therapy and recovery  Pam Wise identified stress related to keeping up with school work while recovering  Writer and Pam Wise discussed how to manage assignments, communicate with professors and balance time  Pam Yousifamanda reported that her mood has been steady with only "1 or 2" episodes of depressed mood  Pam Wise identified anxiety related to school work  Writer actively listened as Sarahvanessagabriela Wise shared and assisted by processing thoughts and feelings  Writer encouraged balance, time management and self-care  Assessment: Pam Wise presented in a neutral mood with congruent affect  Pam Yousifamanda was well engaged and oriented X4  Writer observed positive thinking from Pam Wise during session  Thought content was normal  Insight and judgement intact  SI/HI not reported or observed  Plan: Continue with individual psychotherapy  Current suicide risk : Low     Behavioral Health Treatment Plan [facility]: Diagnosis and Treatment Plan explained to Macrina Clement relates understanding diagnosis and is agreeable to Treatment Plan  Yes     I spent 45 minutes directly with the patient during this visit    VIRTUAL VISIT [address] verbally agrees to participate in [employer]   Pt is aware that Virtual Care Services could be limited without vital signs or the ability to perform a full hands-on physical Bouchra Jurado understands she or the provider may request at any time to terminate the video visit and request the patient to seek care or treatment in person

## 2022-03-21 DIAGNOSIS — F41.9 ANXIETY: ICD-10-CM

## 2022-03-21 RX ORDER — ZOLPIDEM TARTRATE 10 MG/1
TABLET ORAL
Qty: 30 TABLET | Refills: 0 | Status: SHIPPED | OUTPATIENT
Start: 2022-03-21 | End: 2022-04-13

## 2022-03-21 NOTE — PROGRESS NOTES
Telephone call to Roney, she is not taking pain medication  Instructed to not take it with Ghada Rodriguez if she is prescribed more pain medication

## 2022-03-24 ENCOUNTER — TELEMEDICINE (OUTPATIENT)
Dept: BEHAVIORAL/MENTAL HEALTH CLINIC | Facility: CLINIC | Age: 22
End: 2022-03-24
Payer: COMMERCIAL

## 2022-03-24 DIAGNOSIS — F33.1 MODERATE EPISODE OF RECURRENT MAJOR DEPRESSIVE DISORDER (HCC): Primary | ICD-10-CM

## 2022-03-24 DIAGNOSIS — F41.1 GENERALIZED ANXIETY DISORDER: ICD-10-CM

## 2022-03-24 PROCEDURE — 90834 PSYTX W PT 45 MINUTES: CPT | Performed by: SOCIAL WORKER

## 2022-03-24 NOTE — PSYCH
This note was not shared with the patient due to this is a psychotherapy note    Virtual Regular Visit    Verification of patient location:    Patient is located in the following state in which I hold an active license [place]      Assessment/Plan:    Problem List Items Addressed This Visit        Other    Anxiety disorder    Depression - Primary          Goals addressed in session: Goal 1       Reason for visit is   Chief Complaint   Patient presents with    Virtual Regular Visit        Encounter provider Mena Gutierrez    Provider located at [address]  [address]  [place] [address]  [phone]      Recent Visits  No visits were found meeting these conditions  Showing recent visits within past 7 days and meeting all other requirements  Future Appointments  No visits were found meeting these conditions  Showing future appointments within next 150 days and meeting all other requirements       The patient was identified by name and date of birth  Corrine Cortez was informed that this is a telemedicine visit and that the visit is being conducted through[employer] and patient was informed that this is a secure, HIPAA-compliant platform  She agrees to proceed     My office door was closed  No one else was in the room  She acknowledged consent and understanding of privacy and security of the video platform  The patient has agreed to participate and understands they can discontinue the visit at any time  Patient is aware this is a billable service  Psychotherapy Provided: Individual Psychotherapy 45 minutes     Length of time in session: 45 minutes, follow up in 1 week    Encounter Diagnosis     ICD-10-CM    1  Moderate episode of recurrent major depressive disorder (HCC)  F33 1    2   Generalized anxiety disorder  F41 1        Goals addressed in session: Goal 1     Data: Jeffrey Caro reported that her knee is recovering well since her surgery and that she is planning to hold off on her next surgery as long as she can  Freda Delgadillo reported that she will be returning to work once cleared by her doctor  Freda Delgadillo reported that she was able to catch up with school work last week and is feeling positive about her progress with school  Freda Delgadillo reported that she has been having more episodes of sudden sadness  Freda Delgadillo reported that his happens at random and does not seem to have a trigger  Writer spent time exploring this with Freda Delgadillo reported feeling unhappy about her physical appearance  Freda Delgadillo also reported having the expectation for herself of being successful with school  Writer and Freda Delgadillo discussed these feelings in depth  Writer explored underlying causes to these thoughts and expectations, some of which is pressure from family, as well as the role Freda Delgadillo identifies with within her family system  Writer and Freda Delgadillo discussed being able to "re-write" her role, looking forward rather than backward  Writer asked Freda Delgadillo to write down self-expectations to discuss next session - Freda Delgadillo agreed  Assessment: Freda Delgadillo presented in a neutral mood with congruent affect  Freda Delgadillo was well engaged and oriented X3  Depression symptoms have increased  Writer observed low motivation, low self-esteem and negative thinking  SI/HI denied  Plan: Continue with individual psychotherapy  Current suicide risk : Low     Behavioral Health Treatment Plan [facility]: Diagnosis and Treatment Plan explained to Clifton Dang relates understanding diagnosis and is agreeable to Treatment Plan  Yes     I spent 45 minutes with patient today in which greater than 50% of the time was spent in counseling/coordination of care regarding [address] verbally agrees to participate in [facility] [place]   Pt is aware that Virtual Care Services could be limited without vital signs or the ability to perform a full hands-on physical Khadijah Russ understands she or the provider may request at any time to terminate the video visit and request the patient to seek care or treatment in person

## 2022-03-31 ENCOUNTER — TELEMEDICINE (OUTPATIENT)
Dept: BEHAVIORAL/MENTAL HEALTH CLINIC | Facility: CLINIC | Age: 22
End: 2022-03-31
Payer: COMMERCIAL

## 2022-03-31 DIAGNOSIS — F33.1 MODERATE EPISODE OF RECURRENT MAJOR DEPRESSIVE DISORDER (HCC): Primary | ICD-10-CM

## 2022-03-31 DIAGNOSIS — F41.1 GENERALIZED ANXIETY DISORDER: ICD-10-CM

## 2022-03-31 PROCEDURE — 90834 PSYTX W PT 45 MINUTES: CPT | Performed by: SOCIAL WORKER

## 2022-03-31 NOTE — PSYCH
This note was not shared with the patient due to this is a psychotherapy note    Virtual Regular Visit    Verification of patient location:    Patient is located in the following state in which I hold an active license [place]      Assessment/Plan:    Problem List Items Addressed This Visit        Other    Anxiety disorder    Depression - Primary          Goals addressed in session: Goal 1          Reason for visit is   Chief Complaint   Patient presents with    Virtual Regular Visit        Encounter provider Dhara Cortes    Provider located at [address] [place] [address]  [address]  [facility]  [phone]      Recent Visits  Date Type Provider Dept   03/24/22 [address] [place] [address] [facility] [place]   Showing recent visits within past 7 days and meeting all other requirements  Future Appointments  No visits were found meeting these conditions  Showing future appointments within next 150 days and meeting all other requirements       The patient was identified by name and date of birth  Remedios Soto was informed that this is a telemedicine visit and that the visit is being conducted through[employer] and patient was informed that this is a secure, HIPAA-compliant platform  She agrees to proceed     My office door was closed  No one else was in the room  She acknowledged consent and understanding of privacy and security of the video platform  The patient has agreed to participate and understands they can discontinue the visit at any time  Patient is aware this is a billable service  Psychotherapy Provided: Individual Psychotherapy 45 minutes     Length of time in session: 45 minutes, follow up in 1 week    Encounter Diagnosis     ICD-10-CM    1  Moderate episode of recurrent major depressive disorder (HCC)  F33 1    2   Generalized anxiety disorder  F41 1        Goals addressed in session: Goal 1     Data: Chelsy Elizalde reported that she had a very challenging week with increased depressive symptoms  Chelsy Elizalde reported that this started this past Friday, when she began feeling depressed mood  Chelsy Elizalde described various situations over the past week during which she became increasingly depressed and having intrusive, negative thoughts about herself such as "I'm not worth it," "no one wants me around," "I'm in the way " Chelsy Elizalde also expressed significant self-blame and feelings of guilt  Writer spent time exploring these thoughts and feelings with Chelsy Elizalde reported that she has had thoughts of wanting to self-harm, but remains in control of the thoughts and is confident that she will not act on these thoughts  Writer utilized [employer] throughout session, challenging distorted cognitions and assistance Myriam with thought restructuring  Chelsy Elizalde was able to recognize the logic of the thought changes, but her emotions did not sync with the thoughts  Writer advised Chelsy Elizalde to keep track of this depressive episode and any moving forward as Writer has observed cycling depressive episodes since working with Chelsy Elizalde; Chelsy Elizalde agreed  Writer also advised Chelsy Elizalde to schedule an appointment with Dr Saira Coppola to discuss symptoms  Writer emphasized and encouraged self-care to assist with depressed mood  Writer also reviewed safety and use of support system  Assessment: Chelsy Elizalde presented in a depressed mood with congruent affect  Graysonvanessa Elizalde was well engaged and oriented X3  Depression is currently moderate to severe as evidenced by Myriam's report and Writer's observations  Thought content is depressed  Passive thoughts of self-harm are present  SI/HI denied during session  Plan: Continue with individual psychotherapy      Current suicide risk : Low     Behavioral Health Treatment Plan [facility]: Diagnosis and Treatment Plan explained to Lulu Bishop relates understanding diagnosis and is agreeable to Treatment Plan  Yes     I spent 45 minutes directly with the patient during this visit    VIRTUAL VISIT [address] verbally agrees to participate in [employer]  Pt is aware that Virtual Care Services could be limited without vital signs or the ability to perform a full hands-on physical Poli Pap understands she or the provider may request at any time to terminate the video visit and request the patient to seek care or treatment in person

## 2022-04-07 ENCOUNTER — TELEMEDICINE (OUTPATIENT)
Dept: BEHAVIORAL/MENTAL HEALTH CLINIC | Facility: CLINIC | Age: 22
End: 2022-04-07
Payer: COMMERCIAL

## 2022-04-07 DIAGNOSIS — F41.1 GENERALIZED ANXIETY DISORDER: ICD-10-CM

## 2022-04-07 DIAGNOSIS — F33.1 MODERATE EPISODE OF RECURRENT MAJOR DEPRESSIVE DISORDER (HCC): Primary | ICD-10-CM

## 2022-04-07 PROCEDURE — 90834 PSYTX W PT 45 MINUTES: CPT | Performed by: SOCIAL WORKER

## 2022-04-07 NOTE — PSYCH
This note was not shared with the patient due to this is a psychotherapy note    Virtual Regular Visit    Verification of patient location:    Patient is located in the following state in which I hold an active license [place]      Assessment/Plan:    Problem List Items Addressed This Visit        Other    Anxiety disorder    Depression - Primary          Goals addressed in session: Goal 1          Reason for visit is   Chief Complaint   Patient presents with    Virtual Regular Visit        Encounter provider Jodie Spangler    Provider located at [address] [place] [address]  [address]  [place] [address]  [phone]      Recent Visits  Date Type Provider Dept   03/31/22 [address] [place] [address] [facility] [place]   Showing recent visits within past 7 days and meeting all other requirements  Today's Visits  Date Type Provider Dept   04/07/22 [address] [place] [address] [facility] [place]   Showing today's visits and meeting all other requirements  Future Appointments  No visits were found meeting these conditions  Showing future appointments within next 150 days and meeting all other requirements       The patient was identified by name and date of birth  Kiesha Kendall was informed that this is a telemedicine visit and that the visit is being conducted [facility] and patient was informed that this is a secure, HIPAA-compliant platform  She agrees to proceed     My office door was closed  No one else was in the room  She acknowledged consent and understanding of privacy and security of the video platform  The patient has agreed to participate and understands they can discontinue the visit at any time  Patient is aware this is a billable service       Psychotherapy Provided: Individual Psychotherapy 45 minutes     Length of time in session: 45 minutes, follow up in 1 week    Encounter Diagnosis     ICD-10-CM    1  Moderate episode of recurrent major depressive disorder (HCC)  F33 1    2  Generalized anxiety disorder  F41 1        Goals addressed in session: Goal 1     Data: Otis Mott reported a decrease in the depressive symptoms she was experiencing last week  Otis Mott reported that this depressive episode lasted about 10 days and that she could not think of anything specific that impacted it  Otis Mott went on to reported that she did spend time with some old friends prior to this depressive episode ending which was an unexpected, but positive experience  Writer acknowledged the potential of this positive experience impacting Myriam's mood; Otis Mott agreed  Otis Mott went on to discuss school, reporting that she is doing well in classes  Otis Mott discussed taking initiative to apply for a new job which she is hopeful about  Otis Mott reported that although depression symptoms have decreased, she has been struggling with higher anxiety  Otis Mott reported that she notices this when she is around "other people" and provided an example having a 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  Otis Mott also provided the example of feeling anxious about driving in the rain  Writer spent time processing these thoughts and feelings  Writer encouraged use of thought challenging and offered examples to Otis Mott with the specific scenarios she provided  Myriam expressed understanding of the concept but addressed the difficulty of implementing this in the moment  Writer offered emotional validation to Otis Mott and discussed the importance of purposeful and consistent use of the coping technique  Assessment: Otis Mott presented in a neutral mood with congruent affect  Otis Mott was engaged and oriented X3  Depression symptoms have reduced but anxiety symptoms have increased   Otis Mott has difficultly implementing coping skills as she is quickly discouraged if they do not work right away  Thought content was normal with some anxious thoughts  SI/HI not reported or observed  Plan: Continue with individual psychotherapy  Current suicide risk : Low     Behavioral Health Treatment Plan [facility]: Diagnosis and Treatment Plan explained to Cash Tamayo relates understanding diagnosis and is agreeable to Treatment Plan  Yes      I spent 45 minutes directly with the patient during this visit    VIRTUAL VISIT [address] verbally agrees to participate in [employer]  Pt is aware that Virtual Care Services could be limited without vital signs or the ability to perform a full hands-on physical Franklin Thao understands she or the provider may request at any time to terminate the video visit and request the patient to seek care or treatment in person

## 2022-04-14 ENCOUNTER — TELEMEDICINE (OUTPATIENT)
Dept: BEHAVIORAL/MENTAL HEALTH CLINIC | Facility: CLINIC | Age: 22
End: 2022-04-14
Payer: COMMERCIAL

## 2022-04-14 DIAGNOSIS — F33.1 MODERATE EPISODE OF RECURRENT MAJOR DEPRESSIVE DISORDER (HCC): Primary | ICD-10-CM

## 2022-04-14 DIAGNOSIS — F41.1 GENERALIZED ANXIETY DISORDER: ICD-10-CM

## 2022-04-14 PROCEDURE — 90834 PSYTX W PT 45 MINUTES: CPT | Performed by: SOCIAL WORKER

## 2022-04-14 NOTE — BH TREATMENT PLAN
Mary Victoria  2000         Date of Initial Treatment Plan: 5/12/2021   Date of Current Treatment Plan: 4/14/22     Treatment Plan Number 4      Strengths/Personal Resources for Self Care: good listener, empathetic to others, responsible       Diagnosis:   1  Moderate episode of recurrent major depressive disorder ([address] )      2  Anxiety disorder, unspecified type            Area of Needs: improving self-regard, improving ability to cope with depression and anxiety         Long Term Goal 1: "I just want to feel normal "     Target Date: 7/14/22  Completion Date: TBD         Short Term Objectives for Goal 1:               B  Marianela Dickens will explore self during therapy sessions  ( ongoing as of 4/14/22)              B  Marianela Smimichelle will identify changes to improve self-regard  (achieved, ongoing as of 4/16/22)              C  Marianela Smimichelle will implement changes to improve self-regard  (achieved, ongoing as of 4/16/22)           D  Marianela Smimichelle will engage in healthy lifestyle changes such as improving diet and exercise as a means prompting a healthier view of self  (NEW as of 4/14/22)     Long Term Goal 2: Marianela Dickens will utilize healthy coping skills to decrease depression and anxiety      Target Date: 7/14/22  Completion Date: TBD     Short Term Objectives for Goal 2:           A  Marianela Dickens will identify current methods of coping with depression and anxiety  (achieved as of 9/1/21)          B  Marianela Smimichelle will receive psycho-education on healthy coping skills  (achieved as of 1/6/22)          C  Marianlea Smimichelle will implement learned skills to assist with decreasing depression and anxiety  (achieved as of 1/6/22; ongoing)          D   Marianela Smimichelle will identify at least two coping skills used to decrease depression and anxiety during session (NEW as of 4/14/22)      GOAL 1: Modality: Individual 4x per month   Completion Date TBD, Medication Management and The person(s) responsible for carrying out the plan is  Myriam, Therapist, Med Provider      GOAL 2: Modality: Individual 4x per month   Completion Date TBD, Medication Management and The person(s) responsible for carrying out the plan is  Myriam Therapist, Med Provider          [address] [place] [address]: Diagnosis and Treatment Plan explained to Myriam Miguel relates understanding diagnosis and is agreeable to Treatment Plan          Client Comments : Please share your thoughts, feelings, need and/or experiences regarding your treatment plan: "ok "     Brandy Agee, 2000, actively participated in the review and update of this treatment plan during a virtual session, using the [employer] platform  Brandy Agee  provided verbal consent on 4/14/2022 at 9:32 AM  The treatment plan was transcribed into the [employer] Record at a later time

## 2022-04-14 NOTE — PSYCH
This note was not shared with the patient due to this is a psychotherapy note    Virtual Regular Visit    Verification of patient location:    Patient is located in the following state in which I hold an active license [place]      Assessment/Plan:    Problem List Items Addressed This Visit        Other    Anxiety disorder    Depression - Primary          Goals addressed in session: Goal 1 and Goal 2          Reason for visit is   Chief Complaint   Patient presents with    Virtual Regular Visit        Encounter provider Wolm Friends    Provider located at [address] [place] [address]  [address]  [place] [address]  [phone]      Recent Visits  Date Type Provider Dept   04/07/22 [address] [place] [address] [facility] [place]   Showing recent visits within past 7 days and meeting all other requirements  Today's Visits  Date Type Provider Dept   04/14/22 Telemedicine Jo Ann hSah  [facility] [place]   Showing today's visits and meeting all other requirements  Future Appointments  No visits were found meeting these conditions  Showing future appointments within next 150 days and meeting all other requirements       The patient was identified by name and date of birth  Shaji Mckeon was informed that this is a telemedicine visit and that the [facility] and patient was informed that this is a secure, HIPAA-compliant platform  She agrees to proceed     My office door was closed  No one else was in the room  She acknowledged consent and understanding of privacy and security of the video platform  The patient has agreed to participate and understands they can discontinue the visit at any time  Patient is aware this is a billable service       Psychotherapy Provided: Individual Psychotherapy 45 minutes     Length of time in session: 45 minutes, follow up in 1 week    Encounter Diagnosis     ICD-10-CM    1  Moderate episode of recurrent major depressive disorder (HCC)  F33 1    2  Generalized anxiety disorder  F41 1        Goals addressed in session: Goal 1 and Goal 2     Data: Myriam informed Writer that she officially got a new job  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  Frankie Crook reported that this job will be physically demanding, but she believes that she will be able to keep up with the need  Frankie Crook went on to discuss some stress related to school, reporting that the workload has increased as the end of the semester is nearing  Frankie Crook reported some anxiety about the adjustment that will be taking place once she starts her new job  Writer reviewed and updated Myriam's treatment plan  Frankie Crook was able to identify some progress but continues to struggle most with anxiety and poor self-regard  Writer spent time exploring this with Frankie Crook reported that she has recognized that she has to make changes in order to address her view of self  Frankie Crook discussed some of the changes that she intends to make  Writer assisted by discussed healthy ways to implement change  Assessment: Frankie Crook presented in a neutral mood with congruent affect  Frankie Crook was well engaged and oriented X3  Frankie Crook reported decreased depression symptoms, but continues to experience increased anxiety on a regular basis  Frankie Crook offers self-awareness and insight  Thought content was normal with some underlying depressive thoughts  SI/HI not reported or observed  Plan: Continue with individual psychotherapy  Current suicide risk : Low     Behavioral Health Treatment Plan [facility]: Diagnosis and Treatment Plan explained to Lukas Aldridge relates understanding diagnosis and is agreeable to Treatment Plan   Yes      I spent 45 minutes directly with the patient during this visit    VIRTUAL VISIT [id number] [place] verbally agrees to participate in [employer]  Pt is aware that Virtual Care Services could be limited without vital signs or the ability to perform a full hands-on physical Hannaubaldo Araizaconstanza understands she or the provider may request at any time to terminate the video visit and request the patient to seek care or treatment in person

## 2022-04-21 ENCOUNTER — TELEMEDICINE (OUTPATIENT)
Dept: BEHAVIORAL/MENTAL HEALTH CLINIC | Facility: CLINIC | Age: 22
End: 2022-04-21
Payer: COMMERCIAL

## 2022-04-21 DIAGNOSIS — F41.1 GENERALIZED ANXIETY DISORDER: ICD-10-CM

## 2022-04-21 DIAGNOSIS — F33.1 MODERATE EPISODE OF RECURRENT MAJOR DEPRESSIVE DISORDER (HCC): Primary | ICD-10-CM

## 2022-04-21 PROCEDURE — 90832 PSYTX W PT 30 MINUTES: CPT | Performed by: SOCIAL WORKER

## 2022-04-21 NOTE — PSYCH
This note was not shared with the patient due to this is a psychotherapy note    Virtual Regular Visit    Verification of patient location:    Patient is located in the following state in which I hold an active license [place]      Assessment/Plan:    Problem List Items Addressed This Visit        Other    Anxiety disorder    Depression - Primary          Goals addressed in session: Goal 1 and Goal 2          Reason for visit is   Chief Complaint   Patient presents with    Virtual Regular Visit        Encounter provider Lee Chau    Provider located at [address] [place] [address]  [address]  [place] [address]  [phone]      Recent Visits  Date Type Provider Dept   04/14/22 [address] [place] [address] [facility] [place]   Showing recent visits within past 7 days and meeting all other requirements  Future Appointments  No visits were found meeting these conditions  Showing future appointments within next 150 days and meeting all other requirements       The patient was identified by name and date of birth  Chana Poonam was informed that this is a telemedicine visit and that the visit is being conducted through[employer] and patient was informed that this is a secure, HIPAA-compliant platform  She agrees to proceed     My office door was closed  No one else was in the room  She acknowledged consent and understanding of privacy and security of the video platform  The patient has agreed to participate and understands they can discontinue the visit at any time  Patient is aware this is a billable service  Psychotherapy Provided: Individual Psychotherapy 30 minutes     Length of time in session: 30 minutes, follow up in 1 week    Encounter Diagnosis     ICD-10-CM    1  Moderate episode of recurrent major depressive disorder (HCC)  F33 1    2   Generalized anxiety disorder  F41 1 Goals addressed in session: Goal 1 and Goal 2     Data: Writer met with Frankie Canchola individually via [address] [place] [address] tele-therapy platform  Frankie Canchola reported that she started her new job this week and believes that she will like it as she gets better acclimated to the position  Frankie Canchoal discussed her job responsibilities and some of the pros and cons of the work  Myriam informed Writer that today is her birthday and she plans to celebrate by going to dinner with her family joaquin Canchola reported that she enjoys her birthday and feels "special " Frankie Sushant reported that she did have at least one occurrence of sudden depressed mood while at school one day this week  Frankie Canchola reported utilizing self-talk to help reduce symptoms which she identified as helpful  Frankie Canchola reported that she could "feel" the low coming on and that it lasted about a day  Frankie Canchola reported that she could not think of any specific trigger  Writer spent time exploring and processing Juliettes thoughts and feelings as she shared throughout session  Writer emphasized the positive nature of Juliettes efforts to cope  Writer and Frankie Hallaurora agreed to end session early as Myriam's mother wanted to take her to breakfast  Writer encouraged continued use of self-care and healthy coping  Assessment: Frankie Canchola presented in an euthymic mood with congruent affect  Frankie Canchola was well engaged and oriented X3  Depression and anxiety symptoms were observed to be moderate  Thought content was normal  Insight and judgement were intact  Eye contact was good  SI/HI not reported or observed  Plan: Continue with individual psychotherapy  Current suicide risk : Low     Behavioral Health Treatment Plan [facility]: Diagnosis and Treatment Plan explained to Eliecer Perez relates understanding diagnosis and is agreeable to Treatment Plan   Yes     I spent 45 minutes directly with the patient during this visit    VIRTUAL VISIT [address] verbally agrees to participate in [employer]  Pt is aware that Virtual Care Services could be limited without vital signs or the ability to perform a full hands-on physical Nerissa Stands understands she or the provider may request at any time to terminate the video visit and request the patient to seek care or treatment in person

## 2022-04-28 ENCOUNTER — TELEMEDICINE (OUTPATIENT)
Dept: BEHAVIORAL/MENTAL HEALTH CLINIC | Facility: CLINIC | Age: 22
End: 2022-04-28
Payer: COMMERCIAL

## 2022-04-28 DIAGNOSIS — F33.1 MODERATE EPISODE OF RECURRENT MAJOR DEPRESSIVE DISORDER (HCC): Primary | ICD-10-CM

## 2022-04-28 DIAGNOSIS — F41.1 GENERALIZED ANXIETY DISORDER: ICD-10-CM

## 2022-04-28 PROCEDURE — 90834 PSYTX W PT 45 MINUTES: CPT | Performed by: SOCIAL WORKER

## 2022-04-28 NOTE — PSYCH
This note was not shared with the patient due to this is a psychotherapy note    Virtual Regular Visit    Verification of patient location:    Patient is located in the following state in which I hold an active license [place]      Assessment/Plan:    Problem List Items Addressed This Visit        Other    Anxiety disorder    Depression - Primary          Goals addressed in session: Goal 1          Reason for visit is   Chief Complaint   Patient presents with    Virtual Regular Visit        Encounter provider Kymberly Azevedo    Provider located at [address] [place] [address]  [address]  [place] [address]  [phone]      Recent Visits  Date Type Provider Dept   04/21/22 [address] [place] [address] [facility]rap[facility] [place]   Showing recent visits within past 7 days and meeting all other requirements  Today's Visits  Date Type Provider Dept   04/28/22 Telemedicine Kymberly Azevedo [facility] [place]   Showing today's visits and meeting all other requirements  Future Appointments  No visits were found meeting these conditions  Showing future appointments within next 150 days and meeting all other requirements       The patient was identified by name and date of birth  Franky Kinsey was informed that this is a telemedicine visit and that the [facility] and patient was informed that this is a secure, HIPAA-compliant platform  She agrees to proceed     My office door was closed  No one else was in the room  She acknowledged consent and understanding of privacy and security of the video platform  The patient has agreed to participate and understands they can discontinue the visit at any time  Patient is aware this is a billable service       Psychotherapy Provided: Individual Psychotherapy 45 minutes     Length of time in session: 45 minutes, follow up in 1 week    Encounter Diagnosis     ICD-10-CM    1  Moderate episode of recurrent major depressive disorder (HCC)  F33 1    2  Generalized anxiety disorder  F41 1        Goals addressed in session: Goal 1     Data: Writer engaged Ernestine Reid via [address] [place] [address] [facility] tele-therapy platform for an individual psychotherapy session  Ernestine Reid reported that the past week has been busy with work and school  Ernestine Reid reported that she is adjusting to her new job but that it is physically exhausting and has been causing her some knee pain  Ernestine Reid remained positive by stating that she believes her body will adjust  Ernestine Reid went on to discuss some stress related to school and upcoming finals  Ernestine Reid reported that she had a party to celebrate her birthday last weekend and had a good time, but woke up the next day feelings as if she did not deserve to have fun  Writer spent some time exploring this with Ernestine Reid  Writer and Ernestine Reid discussed alcohol as a depressant and the possibility of her depressive symptoms being exacerbated by the alcohol  Writer encouraged thought challenging which Ernestine Reid identified as "difficult in the moment  " Writer suggested that Ernestine Reid use post it's or other means of putting positive affirmations around her bedroom to help remind her to challenge negative thoughts  Ernestine Reid was receptive to this idea and agreed to try  Assessment: Ernestine Reid presented in a neutral mood with congruent affect  Ernestine Reid was well engaged and oriented X3  Eye contact was good  Thought content was normal, thought process was slightly depressed  Insight and judgement were intact  SI/HI not reported or observed  Plan: Continue with individual psychotherapy  Current suicide risk : Low     Behavioral Health Treatment Plan [facility]: Diagnosis and Treatment Plan explained to Jodi Narendra relates understanding diagnosis and is agreeable to Treatment Plan   Yes     I spent 45 minutes directly with the patient during this visit    VIRTUAL VISIT DISCLAIMER    Neena Bergeron verbally agrees to participate in [employer]  Pt is aware that Virtual Care Services could be limited without vital signs or the ability to perform a full hands-on physical Claude Conner understands she or the provider may request at any time to terminate the video visit and request the patient to seek care or treatment in person

## 2022-05-05 ENCOUNTER — TELEMEDICINE (OUTPATIENT)
Dept: BEHAVIORAL/MENTAL HEALTH CLINIC | Facility: CLINIC | Age: 22
End: 2022-05-05
Payer: COMMERCIAL

## 2022-05-05 DIAGNOSIS — F41.1 GENERALIZED ANXIETY DISORDER: ICD-10-CM

## 2022-05-05 DIAGNOSIS — F33.1 MODERATE EPISODE OF RECURRENT MAJOR DEPRESSIVE DISORDER (HCC): Primary | ICD-10-CM

## 2022-05-05 PROCEDURE — 90834 PSYTX W PT 45 MINUTES: CPT | Performed by: SOCIAL WORKER

## 2022-05-05 NOTE — PSYCH
This note was not shared with the patient due to this is a psychotherapy note    Virtual Regular Visit    Verification of patient location:    Patient is located in the following state in which I hold an active license [place]      Assessment/Plan:    Problem List Items Addressed This Visit        Other    Anxiety disorder    Depression - Primary          Goals addressed in session: Goal 1 and Goal 2          Reason for visit is   Chief Complaint   Patient presents with    Virtual Regular Visit        Encounter provider Kymberly Azevedo    Provider located at [address] [place], [place] [id number]  [place] [address]  [phone]      Recent Visits  Date Type Provider Dept   04/28/22 [address] [place] [address] [facility] Therapist [place]   Showing recent visits within past 7 days and meeting all other requirements  Today's Visits  Date Type Provider Dept   05/05/22 Telemedicine [facility]   Showing today's visits and meeting all other requirements  Future Appointments  No visits were found meeting these conditions  Showing future appointments within next 150 days and meeting all other requirements       The patient was identified by name and date of birth  Franky Kinsey was informed that this is a telemedicine visit and that the visit is being conducted [facility] and patient was informed that this is a secure, HIPAA-compliant platform  She agrees to proceed     My office door was closed  No one else was in the room  She acknowledged consent and understanding of privacy and security of the video platform  The patient has agreed to participate and understands they can discontinue the visit at any time  Patient is aware this is a billable service       Psychotherapy Provided: Individual Psychotherapy 45 minutes     Length of time in session: 45 minutes, follow up in 1 week    Encounter Diagnosis     ICD-10-CM    1  Moderate episode of recurrent major depressive disorder (HCC)  F33 1    2  Generalized anxiety disorder  F41 1        Goals addressed in session: Goal 1 and Goal 2     Data: Writer met with Tod Ozuna for an individual psychotherapy session  Tod Ozuna reported that her grandfather had to be "rushed to the hospital" this morning which caused her a lot of worry, but she has since learned that he is stabilizing and believes he will be ok  Tod Ozuna discussed thoughts and feelings surrounding this incident  Tod Ozuna went on to report that she has been experiencing increased anxiety, especially in the mornings and at night  Writer spent time exploring this with Tod Ozuna who identified underlying causes including her new job and school stress  Writer actively listened as Tod Ozuna shared, offered emotional validation and processed thoughts and feelings  Writer provided psycho-education on thought challenging and re-direction  Writer provided Tod Ozuna with examples of how to challenge her negative thoughts and replace them with a positive and/or comforting thought  Writer encouraged Tod Ozuna to practice this over the next week, Tod Ozuna agreed  Assessment: Tod Ozuna presented in a neutral mood with congruent affect  Tod Ozuna was well engaged and oriented X3  Eye contact was good  Thought content was normal, thought process was normal with underlying anxiety  Insight and judgement were intact  SI/HI not reported or observed  Plan: Continue with individual psychotherapy  Myriam informed Writer that she will need to switch to an afternoon appointment (2pm or later) when her next school semester starts  Current suicide risk : Low     Behavioral Health Treatment Plan [facility]: Diagnosis and Treatment Plan explained to Taylor Radford relates understanding diagnosis and is agreeable to Treatment Plan   Yes     I spent 45 minutes directly with the patient during this visit    VIRTUAL VISIT [address] verbally agrees to participate in [employer]  Pt is aware that Virtual Care Services could be limited without vital signs or the ability to perform a full hands-on physical Franklin Osuna understands she or the provider may request at any time to terminate the video visit and request the patient to seek care or treatment in person

## 2022-05-12 ENCOUNTER — TELEMEDICINE (OUTPATIENT)
Dept: BEHAVIORAL/MENTAL HEALTH CLINIC | Facility: CLINIC | Age: 22
End: 2022-05-12
Payer: COMMERCIAL

## 2022-05-12 DIAGNOSIS — F33.1 MODERATE EPISODE OF RECURRENT MAJOR DEPRESSIVE DISORDER (HCC): Primary | ICD-10-CM

## 2022-05-12 DIAGNOSIS — F41.1 GENERALIZED ANXIETY DISORDER: ICD-10-CM

## 2022-05-12 PROCEDURE — 90834 PSYTX W PT 45 MINUTES: CPT | Performed by: SOCIAL WORKER

## 2022-05-12 NOTE — PSYCH
This note was not shared with the patient due to this is a psychotherapy note    Virtual Regular Visit    Verification of patient location:    Patient is located in the following state in which I hold an active license [place]      Assessment/Plan:    Problem List Items Addressed This Visit        Other    Anxiety disorder    Depression - Primary          Goals addressed in session: Goal 1 and Goal 2          Reason for visit is   Chief Complaint   Patient presents with    Virtual Regular Visit        Encounter provider Nayeli Roman    Provider located at [address] [place] [address]  [address] [place] [address]  [phone]      Recent Visits  Date Type Provider Dept   05/05/22 [address] [place] [address] [facility]   Showing recent visits within past 7 days and meeting all other requirements  Today's Visits  Date Type Provider Dept   05/12/22 Telemedicine Nayeli Roman [facility] [place]   Showing today's visits and meeting all other requirements  Future Appointments  No visits were found meeting these conditions  Showing future appointments within next 150 days and meeting all other requirements       The patient was identified by name and date of birth  Morganricofaiza Sarah was informed that this is a telemedicine visit and that the [facility] and patient was informed that this is a secure, HIPAA-compliant platform  She agrees to proceed     My office door was closed  No one else was in the room  She acknowledged consent and understanding of privacy and security of the video platform  The patient has agreed to participate and understands they can discontinue the visit at any time  Patient is aware this is a billable service       Psychotherapy Provided: Individual Psychotherapy 45 minutes     Length of time in session: 45 minutes, follow up in 1 week    Encounter Diagnosis     ICD-10-CM    1  Moderate episode of recurrent major depressive disorder (HCC)  F33 1    2  Generalized anxiety disorder  F41 1        Goals addressed in session: Goal 1 and Goal 2     Data: Otis Mott reported that the past week has been stressful as she has been completing finals for school  Otis Mott discussed challenges with finals, as well as excitement for summer break which will start next week  Otis Mott discussed completing the presentation that she was anxious about last week, reporting that she believes she did "ok " Otis Mott reported that she has been experiencing significant anxiety  Otis Mott reported that the anxiety is worse in the mornings and that she has been having stomachaches and diarrhea most days  Otis Mott reported a source of anxiety is her job  Otis Mott reported that she experiences anxiety and feelings of dread the night before having to work, and then wakes up extremely anxious  Otis Mott reported feeling afraid that she will do something wrong or "mess up " Writer spent time exploring this with Otis Mott  Writer and Otis Mott discussed ways to decrease this anxiety related to her job including communication about her concerns with her supervisor, thought challenging, and self-talk  Writer also encouraged Otis Mott to discuss this increased anxiety with Dr Marycruz Marin during her upcoming medication monitoring appointment  Writer explored use of self-care  Otis Mott reported that she has been trying to shower more consistently, do her hair and makeup which makes her feel better  Writer encouraged increased self-care  Assessment: Otis Mott presented in a depressed mood with congruent affect  Otis Mott was engaged and oriented X3  Eye contact was good  Speech was of normal rate and tone  Thought content was normal but marked by anxiety  Insight and judgement were intact  SI/HI not reported or observed during session      Plan: Continue with individual psychotherapy  Current suicide risk : Low     Behavioral Health Treatment Plan  [facility]: Diagnosis and Treatment Plan explained to Kwabena Wilson relates understanding diagnosis and is agreeable to Treatment Plan  Yes     I spent 45 minutes directly with the patient during this visit    VIRTUAL VISIT [address] verbally agrees to participate in [employer]  Pt is aware that Virtual Care Services could be limited without vital signs or the ability to perform a full hands-on physical Avanell Grounds understands she or the provider may request at any time to terminate the video visit and request the patient to seek care or treatment in person

## 2022-05-23 ENCOUNTER — TELEMEDICINE (OUTPATIENT)
Dept: PSYCHIATRY | Facility: CLINIC | Age: 22
End: 2022-05-23
Payer: COMMERCIAL

## 2022-05-23 DIAGNOSIS — F42.4 DERMATILLOMANIA: Primary | ICD-10-CM

## 2022-05-23 DIAGNOSIS — Z79.899 HIGH RISK MEDICATIONS (NOT ANTICOAGULANTS) LONG-TERM USE: ICD-10-CM

## 2022-05-23 DIAGNOSIS — F33.1 MODERATE EPISODE OF RECURRENT MAJOR DEPRESSIVE DISORDER (HCC): ICD-10-CM

## 2022-05-23 DIAGNOSIS — R45.86 MOOD SWINGS: ICD-10-CM

## 2022-05-23 DIAGNOSIS — F41.1 GENERALIZED ANXIETY DISORDER: ICD-10-CM

## 2022-05-23 DIAGNOSIS — Z86.39 HISTORY OF VITAMIN D DEFICIENCY: ICD-10-CM

## 2022-05-23 PROCEDURE — 90833 PSYTX W PT W E/M 30 MIN: CPT | Performed by: NURSE PRACTITIONER

## 2022-05-23 PROCEDURE — 99214 OFFICE O/P EST MOD 30 MIN: CPT | Performed by: NURSE PRACTITIONER

## 2022-05-23 RX ORDER — ALPRAZOLAM 0.25 MG/1
0.25 TABLET ORAL DAILY PRN
Qty: 30 TABLET | Refills: 0 | Status: SHIPPED | OUTPATIENT
Start: 2022-05-23 | End: 2022-06-21

## 2022-05-23 RX ORDER — DULOXETIN HYDROCHLORIDE 20 MG/1
20 CAPSULE, DELAYED RELEASE ORAL 2 TIMES DAILY
Qty: 60 CAPSULE | Refills: 3 | Status: SHIPPED | OUTPATIENT
Start: 2022-05-23 | End: 2022-08-05

## 2022-05-23 NOTE — BH TREATMENT PLAN
TREATMENT PLAN (Medication Management Only)         [facility]     Name and Date of Lulu russell  o  2000  Date of Treatment Plan: July 6, 2020, updated 10/5/2020, 5/10/2021, 11/9/2021, 5/23/2022  Diagnosis/Diagnoses:    1  Persistent mood (affective) disorder, unspecified ([address] )    2  Anxiety disorder, unspecified type       Strengths/Personal Resources for Self-Care: supportive family, supportive friends, taking medications as prescribed  Area/Areas of need (in own words): anxiety, depression  1          Long Term Goal: maintain mood stability  Target Date: 6 months - 11/23/2022  Person/Persons responsible for completion of goal: Myriam  2          Short Term Objective (s) - How will we reach this goal?:   A  Provider new recommended medication/dosage changes and/or continue medication(s): continue current medications as prescribed  B  stable moods  C  take medication as prescribed, therapy  Target Date: 6 months - 11/23/2022  Person/Persons Responsible for Completion of Goal: Myriam  Progress Towards Goals: continuing treatment  Treatment Modality: medication management every 3 months  Review due 180 days from date of this plan: 6 months - 11/23/2022  Expected length of service: ongoing treatment  My Physician/PA/NP and I have developed this plan together and I agree to work on the goals and objectives   I understand the treatment goals that were developed for my treatment

## 2022-05-23 NOTE — PSYCH
Virtual Regular Visit    Problem List Items Addressed This Visit        Other    Anxiety disorder    Relevant Medications    DULoxetine (CYMBALTA) 20 mg capsule    ALPRAZolam (XANAX) 0 25 mg tablet    Depression    Relevant Medications    DULoxetine (CYMBALTA) 20 mg capsule    ALPRAZolam (XANAX) 0 25 mg tablet    Dermatillomania - Primary    Mood swings      Other Visit Diagnoses     High risk medications (not anticoagulants) long-term use        Relevant Orders    CBC and differential    Comprehensive metabolic panel    HEMOGLOBIN A1C W/ EAG ESTIMATION    Lipid Panel with Direct LDL reflex    TSH, 3rd generation with Free T4 reflex    T3, uptake    History of vitamin D deficiency        Relevant Orders    Vitamin D 25 hydroxy        Reason for visit is   Chief Complaint   Patient presents with    Anxiety    Depression    Mood Swings    Medication Management     Encounter provider Crystal Jarrett, PhD    Provider located at [address] [place] [address]  [address]  [place] [address]  [phone]    Recent Visits  No visits were found meeting these conditions  Showing recent visits within past 7 days and meeting all other requirements  Today's Visits  Date Type Provider Dept   05/23/22 Telemedicine Crystal Jarrett, PhD [facility]   Showing today's visits and meeting all other requirements  Future Appointments  No visits were found meeting these conditions  Showing future appointments within next 150 days and meeting all other requirements       After connecting through [employer], the patient was identified by name and date of birth  Viola Garcia was informed that this is a telemedicine visit and that the visit is being conducted through [address] [place] [address] Now and patient was informed that this is a secure, HIPAA-compliant platform  She agrees to proceed  which may not be secure and therefore, might not be HIPAA-compliant    My office door was closed  No one else was in the room  She acknowledged consent and understanding of privacy and security of the video platform  The patient has agreed to participate and understands they can discontinue the visit at any time  SUBJECTIVE:    Dnonie Ramírez is a 23 y o  female with a history of anxiety and depression seen for medication management and mood assessment  Roney reports she is working at a [employer] [place]  Increased physical work, she is sleeping better, but not eating too much due to anxiety  When anxious she becomes nauseous and vomits  Panic attacks are increased  She continues with school majoring in [employer] arts  Takes medication as prescribed  Family are supportive  Reviewed last lab work      HPI ROS Appetite Changes and Sleep: decreased appetite and decreased energy    Review Of Systems:     Mood Anxiety and Depression   Behavior Normal    Thought Content Normal   General Emotional Problems   Personality Normal   Other Psych Symptoms Normal   Constitutional As Noted in HPI   ENT As Noted in HPI   Cardiovascular As Noted in HPI   Respiratory As Noted in HPI   Gastrointestinal As Noted in HPI   Genitourinary As Noted in HPI   Musculoskeletal As Noted in HPI   Integumentary As Noted in HPI   Neurological As Noted in HPI   Endocrine DM, TSH abnormal   Other Symptoms Normal        Substance Abuse History:    Social History     Substance and Sexual Activity   Drug Use No       Family Psychiatric History:     Family History   Problem Relation Age of Onset    Anxiety disorder Mother     Depression Mother     Asthma Mother     Crohn's disease Mother     Bipolar disorder Mother     Cervical cancer Mother     Hypertension Maternal Grandmother     Depression Maternal Grandfather     Breast cancer Family        Social History     Socioeconomic History    Marital status: Single     Spouse name: Not on file    Number of children: Not on file    Years of education: Not on file   [facility] education level: Not on file   Occupational History    Not on file   Tobacco Use    Smoking status: Never Smoker    Smokeless tobacco: Never Used    Tobacco comment: vape daily   Vaping Use    Vaping Use: Every day    Substances: Nicotine, Flavoring   Substance and Sexual Activity    Alcohol use: No    Drug use: No    Sexual activity: Not on file   Other Topics Concern    Not on file   Social History Narrative    12th grade     Social Determinants of Health     Financial Resource Strain: Not on file   Food Insecurity: Not on file   Transportation Needs: Not on file   Physical Activity: Not on file   Stress: Not on file   Social Connections: Not on file   Intimate Partner Violence: Not on file   Housing Stability: Not on file       Past Medical History:   Diagnosis Date    Anxiety     Asthma     Depression     High triglycerides     total triglyceride 187 repeat normal 70    Psychiatric disorder        Past Surgical History:   Procedure Laterality Date    KNEE ARTHROSCOPY Right 2015    KNEE ARTHROSCOPY Left 2013    KNEE ARTHROSCOPY Left 2017    KNEE SURGERY      UPPER GASTROINTESTINAL ENDOSCOPY         Current Outpatient Medications   Medication Sig Dispense Refill    ALPRAZolam (XANAX) 0 25 mg tablet Take 1 tablet (0 25 mg total) by mouth as needed in the morning for anxiety (panic attack)  30 tablet 0    DULoxetine (CYMBALTA) 20 mg capsule Take 1 capsule (20 mg total) by mouth in the morning and 1 capsule (20 mg total) in the evening   60 capsule 3    albuterol (PROAIR HFA) 90 mcg/act inhaler Inhale 1-2 puffs as needed  (Patient not taking: Reported on 12/21/2021 )      Blood Glucose Monitoring Suppl (OneTouch Verio) w/Device KIT Use to test blood sugar three times a day (Patient not taking: Reported on 12/21/2021 ) 1 kit 0    Brexpiprazole (Rexulti) 2 MG tablet Take 1 tablet (2 mg total) by mouth daily 30 tablet 3    esomeprazole (NexIUM) 20 mg capsule Take 1 capsule (20 mg total) by mouth daily in the early morning 30 capsule 9    glucose blood (OneTouch Verio) test strip Test blood sugar three times a day (Patient not taking: Reported on 12/21/2021 ) 300 each 3    Lancets (onetouch ultrasoft) lancets Test blood sugar three times a day (Patient not taking: Reported on 12/21/2021 ) 300 each 3    meloxicam (MOBIC) 15 mg tablet       norgestimate-ethinyl estradiol (ORTHO-CYCLEN) 0 25-35 MG-MCG per tablet Take 1 tablet by mouth daily      zolpidem (AMBIEN) 10 mg tablet TAKE ONE TABLET BY MOUTH EVERY DAY AT BEDTIME AS NEEDED FOR SLEEP 30 tablet 0     No current facility-administered medications for this visit  No Known Allergies    The following portions of the patient's history were reviewed and updated as appropriate: allergies, current medications, past family history, past medical history, past social history, past surgical history and problem list     OBJECTIVE:     Mental Status Examination:    Appearance calm and cooperative , adequate hygiene and grooming and good eye contact    Mood anxious   Affect affect appropriate    Speech a normal rate   Thought Processes normal thought processes   Hallucinations no hallucinations present    Thought Content no delusions   Abnormal Thoughts no suicidal thoughts  and no homicidal thoughts    Orientation  oriented to person and place and time   Remote Memory short term memory intact and long term memory intact   Attention Span concentration intact   Intellect Appears to be of Average Intelligence   Insight Insight intact   Judgement judgment was intact   Muscle Strength Muscle strength and tone were normal   Language no difficulty naming common objects   Fund of Knowledge displays adequate knowledge of current events   Pain none   Pain Scale 0       Laboratory Results: No results found for this or any previous visit      Assessment/Plan:       Diagnoses and all orders for this visit:    Dermatillomania    Generalized anxiety disorder  - DULoxetine (CYMBALTA) 20 mg capsule; Take 1 capsule (20 mg total) by mouth in the morning and 1 capsule (20 mg total) in the evening   -     ALPRAZolam (XANAX) 0 25 mg tablet; Take 1 tablet (0 25 mg total) by mouth as needed in the morning for anxiety (panic attack)  Moderate episode of recurrent major depressive disorder (HCC)  -     DULoxetine (CYMBALTA) 20 mg capsule; Take 1 capsule (20 mg total) by mouth in the morning and 1 capsule (20 mg total) in the evening  Mood swings    High risk medications (not anticoagulants) long-term use  -     CBC and differential; Future  -     Comprehensive metabolic panel; Future  -     HEMOGLOBIN A1C W/ EAG ESTIMATION; Future  -     Lipid Panel with Direct LDL reflex; Future  -     TSH, 3rd generation with Free T4 reflex; Future  -     T3, uptake; Future  -     CBC and differential  -     Comprehensive metabolic panel  -     HEMOGLOBIN A1C W/ EAG ESTIMATION  -     Lipid Panel with Direct LDL reflex  -     TSH, 3rd generation with Free T4 reflex  -     T3, uptake    History of vitamin D deficiency  -     Vitamin D 25 hydroxy; Future  -     Vitamin D 25 hydroxy          Treatment Recommendations- Risks Benefits      Immediate Medical/Psychiatric/Psychotherapy Treatments and Any Precautions:  reviewed medication continue with treatment plan, Discontinue Wellbutrin, start Duloxetine 20 mg BID, Xanax for anxiety prn  Coping skills, discussed lab work and need to repeat same  Support provided  Therapist to be notified of medication change      Risks, Benefits And Possible Side Effects Of Medications:  {PSYCH RISK, BENEFITS AND POSSIBLE SIDE EFFECTS (Optional):68141    Controlled Medication Discussion: aware of safe use and storage of medication     Psychotherapy Provided: 30 min  Supportive therapy  Medication evaluation  Medication education  Coping skills  Lab work to obtain    Goals discussed in session: reduce anxiety and depression       Treatment Plan:    Completed and signed during the session: Yes - Treatment Plan done but not signed at time of office visit due to:  Plan reviewed by video and verbal consent given due to [address] distancing enacted July 6, 2020, updated 10/5/2020, 5/10/2021, 11/9/2021, 5/23/2022        Algie Holter, PhD 05/23/22

## 2022-05-26 ENCOUNTER — TELEMEDICINE (OUTPATIENT)
Dept: BEHAVIORAL/MENTAL HEALTH CLINIC | Facility: CLINIC | Age: 22
End: 2022-05-26
Payer: COMMERCIAL

## 2022-05-26 DIAGNOSIS — F41.1 GENERALIZED ANXIETY DISORDER: ICD-10-CM

## 2022-05-26 DIAGNOSIS — F33.1 MODERATE EPISODE OF RECURRENT MAJOR DEPRESSIVE DISORDER (HCC): Primary | ICD-10-CM

## 2022-05-26 PROCEDURE — 90834 PSYTX W PT 45 MINUTES: CPT | Performed by: SOCIAL WORKER

## 2022-05-26 NOTE — PSYCH
This note was not shared with the patient due to this is a psychotherapy note    Virtual Regular Visit    Verification of patient location:    Patient is located in the following state in which I hold an active license [place]      Assessment/Plan:    Problem List Items Addressed This Visit        Other    Anxiety disorder    Depression - Primary          Goals addressed in session: Goal 1 and Goal 2          Reason for visit is   Chief Complaint   Patient presents with    Virtual Regular Visit        Encounter provider Ranjana Zepeda    Provider located at [address]  [address]  [place] [address]  [phone]      Recent Visits  No visits were found meeting these conditions  Showing recent visits within past 7 days and meeting all other requirements  Today's Visits  Date Type Provider Dept   05/26/22 Telemedicine Ranjana Zepeda  [facility]   Showing today's visits and meeting all other requirements  Future Appointments  No visits were found meeting these conditions  Showing future appointments within next 150 days and meeting all other requirements       The patient was identified by name and date of birth  Cedrick Courtney was informed that this is a telemedicine visit and that [facility] and patient was informed that this is a secure, HIPAA-compliant platform  She agrees to proceed     My office door was closed  No one else was in the room  She acknowledged consent and understanding of privacy and security of the video platform  The patient has agreed to participate and understands they can discontinue the visit at any time  Patient is aware this is a billable service  Psychotherapy Provided: Individual Psychotherapy 45 minutes     Length of time in session: 45 minutes, follow up in 1 week    Encounter Diagnosis     ICD-10-CM    1  Moderate episode of recurrent major depressive disorder (HCC)  F33 1    2  Generalized anxiety disorder  F41 1        Goals addressed in session: Goal 1 and Goal 2     Data: Writer engaged Vinny for a virtual psychotherapy session via [employer] platform  Vinny reported that she overslept last week which is why she missed her session with Writer  Vinny reported that Dr Alexandra Granado changed her medication and that she started taking the new medication yesterday  [place] reported that she completed her school semester and is pleased with the outcome  Vinny reported that she has not been to work so far this week and was sent home on Sunday due to vomiting  Vinny reported that she believes she was vomiting due to stress and anxiety  Vinny identified significant stress and anxiety on days that she works or days she has to wake up early  Vinny reported that she has been vomiting and/or having diarrhea when feeling this high stress and anxiety  Writer spent time exploring this with Vinny  Writer and Vinny discussed underlying causes and ways to cope  Writer utilized CBT techniques to address anxious thoughts  Writer encouraged self-care and healthy coping  Assessment: Vinny presented in a depressed mood with congruent affect  Vinny was engaged and oriented X3  Eye contact was good  Speech was of normal rate and tone  Thought content was normal  Insight and judgement were intact  Depression and anxiety symptoms were observed to be moderate  SI/HI not reported or observed during session  Plan: Continue with individual psychotherapy  Current suicide risk : Low     Behavioral Health Treatment Plan [facility]: Diagnosis and Treatment Plan explained to Divine Corona relates understanding diagnosis and is agreeable to Treatment Plan   Yes     I spent 45 minutes directly with the patient during this visit    VIRTUAL VISIT [address] verbally agrees to participate in [employer]  Pt is aware that Virtual Care Services could be limited without vital signs or the ability to perform a full hands-on physical Johnson Lupe understands she or the provider may request at any time to terminate the video visit and request the patient to seek care or treatment in person

## 2022-06-09 ENCOUNTER — TELEMEDICINE (OUTPATIENT)
Dept: BEHAVIORAL/MENTAL HEALTH CLINIC | Facility: CLINIC | Age: 22
End: 2022-06-09
Payer: COMMERCIAL

## 2022-06-09 DIAGNOSIS — F33.1 MODERATE EPISODE OF RECURRENT MAJOR DEPRESSIVE DISORDER (HCC): Primary | ICD-10-CM

## 2022-06-09 DIAGNOSIS — F41.1 GENERALIZED ANXIETY DISORDER: ICD-10-CM

## 2022-06-09 PROCEDURE — 90834 PSYTX W PT 45 MINUTES: CPT | Performed by: SOCIAL WORKER
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
## 2022-06-13 DIAGNOSIS — F41.9 ANXIETY: ICD-10-CM

## 2022-06-13 RX ORDER — ZOLPIDEM TARTRATE 10 MG/1
TABLET ORAL
Qty: 30 TABLET | Refills: 0 | Status: SHIPPED | OUTPATIENT
Start: 2022-06-13 | End: 2022-07-12

## 2022-06-16 ENCOUNTER — TELEPHONE (OUTPATIENT)
Dept: BEHAVIORAL/MENTAL HEALTH CLINIC | Facility: CLINIC | Age: 22
End: 2022-06-16

## 2022-06-20 DIAGNOSIS — F41.1 GENERALIZED ANXIETY DISORDER: ICD-10-CM

## 2022-06-21 RX ORDER — ALPRAZOLAM 0.25 MG/1
TABLET ORAL
Qty: 30 TABLET | Refills: 0 | Status: SHIPPED | OUTPATIENT
Start: 2022-06-21 | End: 2022-07-15

## 2022-06-23 DIAGNOSIS — R45.86 MOOD SWINGS: ICD-10-CM

## 2022-06-23 RX ORDER — BREXPIPRAZOLE 2 MG/1
TABLET ORAL
Qty: 30 TABLET | Refills: 3 | Status: SHIPPED | OUTPATIENT
Start: 2022-06-23

## 2022-07-07 ENCOUNTER — TELEMEDICINE (OUTPATIENT)
Dept: BEHAVIORAL/MENTAL HEALTH CLINIC | Facility: CLINIC | Age: 22
End: 2022-07-07
Payer: COMMERCIAL

## 2022-07-07 DIAGNOSIS — F33.1 MODERATE EPISODE OF RECURRENT MAJOR DEPRESSIVE DISORDER (HCC): Primary | ICD-10-CM

## 2022-07-07 DIAGNOSIS — F41.1 GENERALIZED ANXIETY DISORDER: ICD-10-CM

## 2022-07-07 PROCEDURE — 90834 PSYTX W PT 45 MINUTES: CPT | Performed by: SOCIAL WORKER

## 2022-07-07 NOTE — PSYCH
This note was not shared with the patient due to this is a psychotherapy note    Virtual Regular Visit    Verification of patient location:    Patient is located in the following state in which I hold an active license [place]      Assessment/Plan:    Problem List Items Addressed This Visit        Other    Anxiety disorder    Depression - Primary          Goals addressed in session: Goal 1 and Goal 2          Reason for visit is   Chief Complaint   Patient presents with    Virtual Regular Visit        Encounter provider Maxx Moreno    Provider located at [address]  [address]  [place] [address]  [phone]      Recent Visits  No visits were found meeting these conditions  Showing recent visits within past 7 days and meeting all other requirements  Today's Visits  Date Type Provider Dept   07/07/22 [facility] [address] [place] [address] [facility]   Showing today's visits and meeting all other requirements  Future Appointments  No visits were found meeting these conditions  Showing future appointments within next 150 days and meeting all other requirements       The patient was identified by name and date of birth  Jenifer Singh was informed that this is a telemedicine visit and that the [facility] and patient was informed that this is a secure, HIPAA-compliant platform  She agrees to proceed     My office door was closed  No one else was in the room  She acknowledged consent and understanding of privacy and security of the video platform  The patient has agreed to participate and understands they can discontinue the visit at any time  Patient is aware this is a billable service  Psychotherapy Provided: Individual Psychotherapy 45 minutes     Length of time in session: 45 minutes, follow up in 3 week    Encounter Diagnosis     ICD-10-CM    1  Moderate episode of recurrent major depressive disorder (HCC)  F33 1    2  Generalized anxiety disorder  F41 1        Goals addressed in session: Goal 1 and Goal 2     Data: Writer met with Memo King for a virtual individual psychotherapy session  Memo King reported that a lot of changes have occurred over the past few weeks, and went on to share  Memo King reported that she was fired from her job, noting that her boss stated it was not a "good fit " Memo King reported that this happened on a day that she was having a panic attack and requested time to de-escalate  Memo King reported that there were other times she had to leave work early because she vomited (due to anxiety)  Memo King reported that she was upset that the reason she was fired was something that was out of her control, but in other ways felt relief  Memo King reported that she is now looking for a new job, and one that will be less physically demanding  Memo King went on to share that she has been spending time with friends, and met a new romantic interest  Memo King shared the details of engaging in this romantic relationship, including her thoughts and feelings  Memo King reported that she had been feeling "good" for a few weeks, but has been feeling depressed over the last few days  Memo King reported that she does currently have her period which could contribute to her low mood  Memo King identified anxiety related to looking for a job  Memo King reported that she believes her current medication regime is working well as she is able to cope  Writer actively listened as Memo King shared and assisted by exploring and processing thoughts and feelings  Writer offered emotional validation throughout session  Writer also utilized thought challenging and re-direction of negative thoughts  Writer encouraged self-care and continued healthy coping  Assessment: Memo King presented in a mildly depressed mood with congruent affect  Memo King was engaged and oriented X3  Eye contact was good  Speech was of normal rate and tone  Thought content was normal  Insight and judgement were intact  Depression and anxiety symptoms elevated at the time of session - likely situational  SI/HI not reported or observed during session  Plan: Next appointment in three weeks as Writer will be on vacation and then Chelsy Fide will be on vacation  Current suicide risk : Low     Behavioral Health Treatment Plan [facility]: Diagnosis and Treatment Plan explained to Lulu Bishop relates understanding diagnosis and is agreeable to Treatment Plan  Yes     I spent 45 minutes directly with the patient during this visit    VIRTUAL VISIT [address] verbally agrees to participate in [employer]  Pt is aware that Virtual Care Services could be limited without vital signs or the ability to perform a full hands-on physical Nuno Romeo understands she or the provider may request at any time to terminate the video visit and request the patient to seek care or treatment in person

## 2022-07-12 DIAGNOSIS — F41.9 ANXIETY: ICD-10-CM

## 2022-07-12 RX ORDER — ZOLPIDEM TARTRATE 10 MG/1
TABLET ORAL
Qty: 30 TABLET | Refills: 0 | Status: SHIPPED | OUTPATIENT
Start: 2022-07-12 | End: 2022-08-05

## 2022-07-15 DIAGNOSIS — F41.1 GENERALIZED ANXIETY DISORDER: ICD-10-CM

## 2022-07-15 RX ORDER — ALPRAZOLAM 0.25 MG/1
TABLET ORAL
Qty: 30 TABLET | Refills: 0 | Status: SHIPPED | OUTPATIENT
Start: 2022-07-15 | End: 2022-08-04

## 2022-07-19 ENCOUNTER — TELEPHONE (OUTPATIENT)
Dept: PSYCHIATRY | Facility: CLINIC | Age: 22
End: 2022-07-19

## 2022-07-27 ENCOUNTER — OFFICE VISIT (OUTPATIENT)
Dept: URGENT CARE | Facility: CLINIC | Age: 22
End: 2022-07-27
Payer: COMMERCIAL

## 2022-07-27 ENCOUNTER — APPOINTMENT (OUTPATIENT)
Dept: RADIOLOGY | Facility: CLINIC | Age: 22
End: 2022-07-27
Payer: COMMERCIAL

## 2022-07-27 VITALS — TEMPERATURE: 98.2 F | OXYGEN SATURATION: 98 % | HEART RATE: 100 BPM | RESPIRATION RATE: 14 BRPM

## 2022-07-27 DIAGNOSIS — T14.90XA INJURY: Primary | ICD-10-CM

## 2022-07-27 DIAGNOSIS — T14.90XA INJURY: ICD-10-CM

## 2022-07-27 PROCEDURE — 73630 X-RAY EXAM OF FOOT: CPT

## 2022-07-27 PROCEDURE — 73610 X-RAY EXAM OF ANKLE: CPT

## 2022-07-27 PROCEDURE — 99213 OFFICE O/P EST LOW 20 MIN: CPT | Performed by: STUDENT IN AN ORGANIZED HEALTH CARE EDUCATION/TRAINING PROGRAM

## 2022-07-27 NOTE — PROGRESS NOTES
[facility] Now        NAME: Ramy Willis is a 23 y o  female  : 2000    MRN: [id number]  DATE: 2022  TIME: 2:14 PM    Assessment and Plan   Injury Anan Cortés  1  Injury  XR ankle 3+ vw right    XR foot 3+ vw right     [facility] therapy, await x-ray final read, wet read does not show any fractures    Patient Instructions       Follow up with PCP in 3-5 days  Proceed to  ER if symptoms worsen  Chief Complaint     Chief Complaint   Patient presents with    Injury     Pt states she fell down stairs yesterday; pain in the right ankle/ foot         History of Present Illness       HPI  Patient presents complaining of right ankle pain and swelling ongoing since yesterday  Patient has this states that she sustained a fall yesterday injuring her right ankle and foot  Patient denies any weakness numbness tingling or loss of sensation  She is able to ambulate but has pain when she walks    Review of Systems   Review of Systems  Per HPI    Current Medications       Current Outpatient Medications:     albuterol (PROVENTIL HFA,VENTOLIN HFA) 90 mcg/act inhaler, Inhale 1-2 puffs as needed, Disp: , Rfl:     ALPRAZolam (XANAX) 0 25 mg tablet, TAKE 1 TABLET BY MOUTH AS NEEDED IN THE MORNING FOR ANXIETY OR PANIC ATTACK, Disp: 30 tablet, Rfl: 0    Blood Glucose Monitoring Suppl (OneTouch Verio) w/Device KIT, Use to test blood sugar three times a day, Disp: 1 kit, Rfl: 0    DULoxetine (CYMBALTA) 20 mg capsule, Take 1 capsule (20 mg total) by mouth in the morning and 1 capsule (20 mg total) in the evening , Disp: 60 capsule, Rfl: 3    esomeprazole (NexIUM) 20 mg capsule, Take 1 capsule (20 mg total) by mouth daily in the early morning, Disp: 30 capsule, Rfl: 9    glucose blood (OneTouch Verio) test strip, Test blood sugar three times a day, Disp: 300 each, Rfl: 3    Lancets (onetouch ultrasoft) lancets, Test blood sugar three times a day, Disp: 300 each, Rfl: 3    Rexulti 2 MG tablet, TAKE ONE TABLET BY MOUTH EVERY DAY, Disp: 30 tablet, Rfl: 3    zolpidem (AMBIEN) 10 mg tablet, TAKE ONE TABLET BY MOUTH EVERY DAY AT BEDTIME AS NEEDED FOR SLEEP, Disp: 30 tablet, Rfl: 0    norgestimate-ethinyl estradiol (ORTHO-CYCLEN) 0 25-35 MG-MCG per tablet, Take 1 tablet by mouth daily, Disp: , Rfl:     Current Allergies     Allergies as of 07/27/2022    (No Known Allergies)            The following portions of the patient's history were reviewed and updated as appropriate: allergies, current medications, past family history, past medical history, past social history, past surgical history and problem list      Past Medical History:   Diagnosis Date    Anxiety     Asthma     Depression     High triglycerides     total triglyceride 187 repeat normal 70    Psychiatric disorder        Past Surgical History:   Procedure Laterality Date    KNEE ARTHROSCOPY Right 2015    KNEE ARTHROSCOPY Left 2013    KNEE ARTHROSCOPY Left 2017    KNEE SURGERY      UPPER GASTROINTESTINAL ENDOSCOPY         Family History   Problem Relation Age of Onset    Anxiety disorder Mother     Depression Mother     Asthma Mother     Crohn's disease Mother     Bipolar disorder Mother     Cervical cancer Mother     Hypertension Maternal Grandmother     Depression Maternal Grandfather     Breast cancer Family          Medications have been verified  Objective   Pulse 100   Temp 98 2 °F (36 8 °C)   Resp 14   LMP 07/07/2022   SpO2 98%   Patient's last menstrual period was 07/07/2022  Physical Exam     Physical Exam  Constitutional:       General: She is not in acute distress  Appearance: Normal appearance  HENT:      Head: Normocephalic  Nose: No congestion or rhinorrhea  Mouth/Throat:      Mouth: Mucous membranes are moist       Pharynx: No oropharyngeal exudate or posterior oropharyngeal erythema  Eyes:      General:         Right eye: No discharge  Left eye: No discharge        Conjunctiva/sclera: Conjunctivae normal    Cardiovascular:      Rate and Rhythm: Normal rate and regular rhythm  Pulses: Normal pulses  Pulmonary:      Effort: Pulmonary effort is normal  No respiratory distress  Abdominal:      General: Abdomen is flat  There is no distension  Palpations: Abdomen is soft  Tenderness: There is no abdominal tenderness  Musculoskeletal:      Cervical back: Neck supple  Comments: Range of motion right ankle has limited due to pain, there is no swelling or tenderness or erythema or bruising   Skin:     General: Skin is warm  Capillary Refill: Capillary refill takes less than 2 seconds  Neurological:      Mental Status: She is alert and oriented to person, place, and time

## 2022-07-28 ENCOUNTER — TELEMEDICINE (OUTPATIENT)
Dept: BEHAVIORAL/MENTAL HEALTH CLINIC | Facility: CLINIC | Age: 22
End: 2022-07-28
Payer: COMMERCIAL

## 2022-07-28 DIAGNOSIS — F33.1 MODERATE EPISODE OF RECURRENT MAJOR DEPRESSIVE DISORDER (HCC): Primary | ICD-10-CM

## 2022-07-28 DIAGNOSIS — F41.1 GENERALIZED ANXIETY DISORDER: ICD-10-CM

## 2022-07-28 PROCEDURE — 90834 PSYTX W PT 45 MINUTES: CPT | Performed by: SOCIAL WORKER

## 2022-07-28 NOTE — PSYCH
This note was not shared with the patient due to this is a psychotherapy note    Virtual Regular Visit    Verification of patient location:    Patient is located in the following state in which I hold an active license [place]      Assessment/Plan:    Problem List Items Addressed This Visit        Other    Anxiety disorder    Depression - Primary          Goals addressed in session: Goal 1 and Goal 2          Reason for visit is   Chief Complaint   Patient presents with    Virtual Regular Visit        Encounter provider Jennifer Blackwell    Provider located at [address]  [address]  [place] [address]  [phone]      Recent Visits  No visits were found meeting these conditions  Showing recent visits within past 7 days and meeting all other requirements  Today's Visits  Date Type Provider Dept   07/28/22 [facility] [address] [place] [address] [facility]   Showing today's visits and meeting all other requirements  Future Appointments  No visits were found meeting these conditions  Showing future appointments within next 150 days and meeting all other requirements       The patient was identified by name and date of birth  Aram Grubbs was informed that this is a telemedicine visit and that the visit is being conducted through[facility] [facility] and patient was informed that this is a secure, HIPAA-compliant platform  She agrees to proceed     My office door was closed  No one else was in the room  She acknowledged consent and understanding of privacy and security of the video platform  The patient has agreed to participate and understands they can discontinue the visit at any time  Patient is aware this is a billable service  Psychotherapy Provided: Individual Psychotherapy 45 minutes     Length of time in session: 45 minutes, follow up in 1 week    Encounter Diagnosis     ICD-10-CM    1  Moderate episode of recurrent major depressive disorder (HCC)  F33 1    2  Generalized anxiety disorder  F41 1        Goals addressed in session: Goal 1 and Goal 2     Data: Writer engaged Jorge Redo virtually for an individual psychotherapy session  Jorge Armenta discussed her recent vacation, reported that she is no longer talking to a kay that she was dating, and that she got a job  Jorge Chitocherie reported that she has been feeling very depressed over the past few weeks  Jorge Redo reported that she could not think of a specific trigger or reasoning to the increased depression  Jorge Chitocherie reported that she has had moments of feeling suicidal and she disclosed that she did engage in self-harming about two weeks ago (one time)  Jorge Redo stated that she believes she would not act on suicidal ideation because she is aware that doing so would devastate her family  Jorge Kathi reported that she did tell her parents how she has been feeling and that they have been supportive  Jorge Redo reported that the last time she felt SI was about three days ago  Jorge Armenta reported that she has an appointment with Dr hSari Platt on Monday and intends to discuss these challenges with her  Writer actively listened as Jorge Armenta shared and offered emotional validation  Writer and Jorge Armenta discussed safety and resources  Jorge Redo reported that she has been smoking marijuana at night sometimes to cope  Jorge Armenta reported that she has been journaling which has been significantly helpful  Jorge Armenta reported that she also contacts friends and/or family when she is feeling down  Writer provided Jorge Armenta with the phone number for [facility] ([phone]) as another resource  Writer and Jorge Armenta also discussed higher levels of care  Jorge Armenta reported that she would like to see how her appointment with Dr Shari Platt goes on Monday before making decisions  Assessment: Jorge Armenta presented in a depressed mood with congruent affect   Jorge Armenta was well engaged and oriented X3  Eye contact was good, speech was of normal rate and tone  Thought content was normal  Insight and judgement were intact  Depression symptoms have increased over the past 3-4 weeks with no specific trigger  Jong Holding reports taking medication as prescribed  Use of coping skills and safety are present  SI denied at the time of session  Plan: Continue with individual psychotherapy, continue to assess for safety  Current suicide risk : Low     Behavioral Health Treatment Plan [facility]: Diagnosis and Treatment Plan explained to Noemy Aguiar relates understanding diagnosis and is agreeable to Treatment Plan  Yes      I spent 45 minutes directly with the patient during this visit    VIRTUAL VISIT [address] verbally agrees to participate in [employer]  Pt is aware that Virtual Care Services could be limited without vital signs or the ability to perform a full hands-on physical Vear Andrea understands she or the provider may request at any time to terminate the video visit and request the patient to seek care or treatment in person

## 2022-08-01 ENCOUNTER — TELEMEDICINE (OUTPATIENT)
Dept: PSYCHIATRY | Facility: CLINIC | Age: 22
End: 2022-08-01
Payer: COMMERCIAL

## 2022-08-01 DIAGNOSIS — F51.05 INSOMNIA DUE TO OTHER MENTAL DISORDER: ICD-10-CM

## 2022-08-01 DIAGNOSIS — R53.83 OTHER FATIGUE: ICD-10-CM

## 2022-08-01 DIAGNOSIS — R45.86 MOOD SWINGS: ICD-10-CM

## 2022-08-01 DIAGNOSIS — F33.1 MODERATE EPISODE OF RECURRENT MAJOR DEPRESSIVE DISORDER (HCC): Primary | ICD-10-CM

## 2022-08-01 DIAGNOSIS — F99 INSOMNIA DUE TO OTHER MENTAL DISORDER: ICD-10-CM

## 2022-08-01 DIAGNOSIS — F41.1 GENERALIZED ANXIETY DISORDER: ICD-10-CM

## 2022-08-01 PROCEDURE — 99214 OFFICE O/P EST MOD 30 MIN: CPT | Performed by: NURSE PRACTITIONER

## 2022-08-01 PROCEDURE — 90833 PSYTX W PT W E/M 30 MIN: CPT | Performed by: NURSE PRACTITIONER

## 2022-08-01 RX ORDER — TRAZODONE HYDROCHLORIDE 100 MG/1
TABLET ORAL
Qty: 30 TABLET | Refills: 3 | Status: SHIPPED | OUTPATIENT
Start: 2022-08-01 | End: 2022-08-29

## 2022-08-01 NOTE — PSYCH
Virtual Regular Visit    Problem List Items Addressed This Visit        Other    Anxiety disorder    Depression - Primary    Mood swings        Reason for visit is   Chief Complaint   Patient presents with    Anxiety    Depression    Mood Swings    Medication Management     Encounter provider Jace Gomez, PhD    Provider located at [address] [place] [address] [address] [place] [address]  [address]  [place] [address]  [phone]    Recent Visits  No visits were found meeting these conditions  Showing recent visits within past 7 days and meeting all other requirements  Today's Visits  Date Type Provider Dept   08/01/22 Telemedicine Jace Gomez, PhD [facility]   Showing today's visits and meeting all other requirements  Future Appointments  No visits were found meeting these conditions  Showing future appointments within next 150 days and meeting all other requirements       After connecting through [employer], the patient was identified by name and date of birth  Tad Davila was informed that this is a telemedicine visit and that the visit is being conducted through [address] [place] [address] Now and patient was informed that this is a secure, HIPAA-compliant platform  She agrees to proceed  which may not be secure and therefore, might not be HIPAA-compliant  My office door was closed  No one else was in the room  She acknowledged consent and understanding of privacy and security of the video platform  The patient has agreed to participate and understands they can discontinue the visit at any time  SUBJECTIVE:    Tad Davila is a 23 y o  female with a history of anxiety and depression seen for medication management and mood assessment  Roney reports she was fired from her job, last couple of weeks increased depression with SI no intent every couple of days  Sleeping is poor, melatonin not working, cutting herself again after stopping for 2 years  Appetite is poor due to anxiety  When anxious she becomes nauseous and vomits  Panic attacks are increased  She continues with school majoring in [employer]  Takes medication as prescribed   Family are supportive        HPI ROS Appetite Changes and Sleep: decreased appetite and decreased energy    Review Of Systems:     Mood Anxiety, Depression and Emotional Lability   Behavior Suicidal ideas   Thought Content Disturbing Thoughts, Feelings   General Relationship Problems, Emotional Problems and Sleep Disturbances   Personality Normal   Other Psych Symptoms Normal   Constitutional As Noted in HPI   ENT As Noted in HPI   Cardiovascular As Noted in HPI   Respiratory As Noted in HPI   Gastrointestinal As Noted in HPI   Genitourinary As Noted in HPI   Musculoskeletal As Noted in HPI   Integumentary As Noted in HPI   Neurological As Noted in HPI   Endocrine Normal    Other Symptoms Normal        Substance Abuse History:    Social History     Substance and Sexual Activity   Drug Use No       Family Psychiatric History:     Family History   Problem Relation Age of Onset    Anxiety disorder Mother     Depression Mother     Asthma Mother     Crohn's disease Mother     Bipolar disorder Mother     Cervical cancer Mother     Hypertension Maternal Grandmother     Depression Maternal Grandfather     Breast cancer Family        Social History     Socioeconomic History    Marital status: Single     Spouse name: Not on file    Number of children: Not on file    Years of education: Not on file    Highest education level: Not on file   Occupational History    Not on file   Tobacco Use    Smoking status: Never Smoker    Smokeless tobacco: Never Used    Tobacco comment: vape daily   Vaping Use    Vaping Use: Every day    Substances: Nicotine, Flavoring   Substance and Sexual Activity    Alcohol use: No    Drug use: No    Sexual activity: Not on file   Other Topics Concern    Not on file   Social History Narrative 12th grade     Social Determinants of Health     Financial Resource Strain: Not on file   Food Insecurity: Not on file   Transportation Needs: Not on file   Physical Activity: Not on file   Stress: Not on file   Social Connections: Not on file   Intimate Partner Violence: Not on file   Housing Stability: Not on file       Past Medical History:   Diagnosis Date    Anxiety     Asthma     Depression     High triglycerides     total triglyceride 187 repeat normal 70    Psychiatric disorder        Past Surgical History:   Procedure Laterality Date    KNEE ARTHROSCOPY Right 2015    KNEE ARTHROSCOPY Left 2013    KNEE ARTHROSCOPY Left 2017    KNEE SURGERY      UPPER GASTROINTESTINAL ENDOSCOPY         Current Outpatient Medications   Medication Sig Dispense Refill    albuterol (PROVENTIL HFA,VENTOLIN HFA) 90 mcg/act inhaler Inhale 1-2 puffs as needed      ALPRAZolam (XANAX) 0 25 mg tablet TAKE 1 TABLET BY MOUTH AS NEEDED IN THE MORNING FOR ANXIETY OR PANIC ATTACK 30 tablet 0    Blood Glucose Monitoring Suppl (OneTouch Verio) w/Device KIT Use to test blood sugar three times a day 1 kit 0    DULoxetine (CYMBALTA) 20 mg capsule Take 1 capsule (20 mg total) by mouth in the morning and 1 capsule (20 mg total) in the evening  60 capsule 3    esomeprazole (NexIUM) 20 mg capsule Take 1 capsule (20 mg total) by mouth daily in the early morning 30 capsule 9    glucose blood (OneTouch Verio) test strip Test blood sugar three times a day 300 each 3    Lancets (onetouch ultrasoft) lancets Test blood sugar three times a day 300 each 3    norgestimate-ethinyl estradiol (ORTHO-CYCLEN) 0 25-35 MG-MCG per tablet Take 1 tablet by mouth daily      Rexulti 2 MG tablet TAKE ONE TABLET BY MOUTH EVERY DAY 30 tablet 3    zolpidem (AMBIEN) 10 mg tablet TAKE ONE TABLET BY MOUTH EVERY DAY AT BEDTIME AS NEEDED FOR SLEEP 30 tablet 0     No current facility-administered medications for this visit          No Known Allergies    The following portions of the patient's history were reviewed and updated as appropriate: allergies, current medications, past family history, past medical history, past social history, past surgical history and problem list     OBJECTIVE:     Mental Status Examination:    Appearance calm and cooperative , adequate hygiene and grooming and poor eye contact    Mood depressed and anxious   Affect affect was constricted   Speech a normal rate   Thought Processes normal thought processes   Hallucinations no hallucinations present    Thought Content no delusions   Abnormal Thoughts no suicidal thoughts  and no homicidal thoughts    Orientation  oriented to person and place and time   Remote Memory short term memory intact and long term memory intact   Attention Span concentration intact   Intellect Appears to be of Average Intelligence   Insight Insight intact   Judgement judgment was intact   Muscle Strength Muscle strength and tone were normal   Language no difficulty naming common objects   Fund of Knowledge displays adequate knowledge of current events   Pain none   Pain Scale 0       Laboratory Results: No results found for this or any previous visit  Assessment/Plan:       Diagnoses and all orders for this visit:    Moderate episode of recurrent major depressive disorder (HCC)    Mood swings    Generalized anxiety disorder          Treatment Recommendations- Risks Benefits      Immediate Medical/Psychiatric/Psychotherapy Treatments and Any Precautions:  reviewed medication continue with treatment plan, discussed safety plan, increased need for sleep, encouraged her to go to IOP she agreed  Spoke to her therapist and she agreed   Referred to [facility] Trazodone 1/2 or 1 tablet at night for sleep    Risks, Benefits And Possible Side Effects Of Medications:  {PSYCH RISK, BENEFITS AND POSSIBLE SIDE EFFECTS (Optional):56802    Controlled Medication Discussion: She is aware of safe use and storage of medication Psychotherapy Provided: 30 min  Supportive therapy  Medication evaluation  Medication assessment  Coping skills  Discussed IOP process  Safety plan, she will call therapist, undersigned or go to ER if increased SI    Goals discussed in session: Safety, attend IOP       Treatment Plan:    Completed and signed during the session: Not applicable - Treatment Plan not due at this session enacted July 6, 2020, updated 10/5/2020, 5/10/2021, 11/9/2021, 5/23/2022              Cheryl Au, PhD 08/01/22

## 2022-08-01 NOTE — PATIENT INSTRUCTIONS
IOP Continue medications  Call with problems or concerns   Trazodone 1/2 or 1 tablet (100 mg) at bedtime prn

## 2022-08-04 ENCOUNTER — TELEMEDICINE (OUTPATIENT)
Dept: BEHAVIORAL/MENTAL HEALTH CLINIC | Facility: CLINIC | Age: 22
End: 2022-08-04
Payer: COMMERCIAL

## 2022-08-04 DIAGNOSIS — F41.1 GENERALIZED ANXIETY DISORDER: ICD-10-CM

## 2022-08-04 DIAGNOSIS — F33.1 MODERATE EPISODE OF RECURRENT MAJOR DEPRESSIVE DISORDER (HCC): Primary | ICD-10-CM

## 2022-08-04 PROCEDURE — 90834 PSYTX W PT 45 MINUTES: CPT | Performed by: SOCIAL WORKER
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
## 2022-08-05 ENCOUNTER — TELEMEDICINE (OUTPATIENT)
Dept: PSYCHIATRY | Facility: CLINIC | Age: 22
End: 2022-08-05
Payer: COMMERCIAL

## 2022-08-05 ENCOUNTER — OFFICE VISIT (OUTPATIENT)
Dept: PSYCHOLOGY | Facility: CLINIC | Age: 22
End: 2022-08-05
Payer: COMMERCIAL

## 2022-08-05 DIAGNOSIS — F41.1 GENERALIZED ANXIETY DISORDER: ICD-10-CM

## 2022-08-05 DIAGNOSIS — F33.1 MODERATE EPISODE OF RECURRENT MAJOR DEPRESSIVE DISORDER (HCC): Primary | ICD-10-CM

## 2022-08-05 PROCEDURE — 90792 PSYCH DIAG EVAL W/MED SRVCS: CPT | Performed by: PSYCHIATRY & NEUROLOGY

## 2022-08-05 PROCEDURE — 90791 PSYCH DIAGNOSTIC EVALUATION: CPT

## 2022-08-05 RX ORDER — DULOXETIN HYDROCHLORIDE 60 MG/1
60 CAPSULE, DELAYED RELEASE ORAL 2 TIMES DAILY
Qty: 30 CAPSULE | Refills: 1 | Status: SHIPPED | OUTPATIENT
Start: 2022-08-05 | End: 2022-08-29 | Stop reason: SDUPTHER

## 2022-08-05 NOTE — BH TREATMENT PLAN
Assessment/Plan:      Diagnoses and all orders for this visit:    Moderate episode of recurrent major depressive disorder (HCC)    Generalized anxiety disorder          Subjective:     Patient ID: Oralia Hutchinson is a 23 y o  female  [facility] Treatment Plan   AREAS OF NEED: Depression and Anxiety as evidenced by self harming, trouble with falling asleep, loss of appetite, lack of energy and motivation, decreased interest, decrease in ADL's, feeling hopeless, feeling useless, SI but no intent, passive death wishes, nauseous to the point of vomiting,body shaking, hyperventilating, feeling like she can't breath, weight on chest, panic attacks, worrying and ruminating   Date Initiated: 08/05/22    Strengths:  "Smart, good at school, caring, good friend, responsible, respectful"    LONG TERM GOAL:   Date Initiated: 08/05/22  1 0 I will identify 3 signs that I am feeling less depressed and anxious and more productive in my day to day life  Target Date: 9/2/22  Completion Date:       SHORT TERM OBJECTIVES:     Date Initiated: 08/05/22  1 1 I will create a list identifying coping skills that I have learned and discuss how I plan to build them into my schedule  I will choose at least 1 different coping skill daily to practice and will journal about the challenges and success with implementing it each skill  Revision Date:   Target Date: 8/17/22  Completion Date:     Date Initiated: 08/05/22  1 2 I will learn and engage in at least one self-care technique daily to improve my depressive symptoms and share my experience  Revision Date:   Target Date: 8/17/22  Completion Date:    Date Initiated: 08/05/22  1 3 I will take medications as prescribed and share questions and concerns if arise  Revision Date:  Target Date: 8/17/22  Completion Date:     Date Initiated: 08/05/22  1 4 I will identify 3 ways my supports can assist in my recovery and agree to staff/support contact as indicated      Revision Date:  Target Date: 8/17/22  Completion Date:          7 DAY REVISION:    Date Initiated:  Revision Date:   Target Date:   Completion Date:      PSYCHIATRY:  Date Initiated:  08/05/22  Medication Management and Education       Revision Date:       The person(s) responsible for carrying out the plan is Altaf Barker MD    NURSING/SYMPTOM EDUCATION:   Date Initiated: 08/05/22  1 1, 1 2  1 3, 1 4 This RN/Or Therapist will provide wellness/symptoms and skill education groups three to five days weekly to educate Mavis Torres on signs and symptoms of diagnoses, skills to manage, and medication questions that will be addressed by the treatment team     Revision date: The person(s) responsible for carrying out the plan is FRANNY Viveros, SHANTHIW ; Hailey Krueger RN, BSN    PSYCHOLOGY:   Date Initiated: 08/05/22       1 1, 1 2, 1 4 Provide psychotherapy group 5 times per week to allow opportunity for Mavis Torres  to explore stressors and ways of coping  Revision Date:   The person(s) responsible for carrying out the plan is FRANNY Viveros,NATALIA    ALLIED THERAPY:   Date Initiated: 08/05/22  1 1,1 2 Engage Mavis Torres in AT group 5 times weekly to encourage development and use of wellness tools to decrease symptoms and promote recovery through meaningful activity  Revision Date:       The person(s) responsible for carrying out the plan is NINOSKA Mena ; NINOSKA Wells    CASE MANAGEMENT:   Date Initiated: 08/05/22      1 0 This  will meet with Mavis Brian  3-4 times weekly to assess treatment progress, discharge planning, connection to community supports and UR as indicated  Revision Date:   The person(s) responsible for carrying out the plan is Chas Floyd    TREATMENT REVIEW/COMMENTS:     DISCHARGE CRITERIA: Identify 3 signs of progress and complete relapse prevention plan  DISCHARGE PLAN: Connect with identified outpatient providers     Estimated Length of Stay: 10 treatment days             Diagnosis and Treatment Plan explained to Vita Whitfield relates understanding diagnosis and is agreeable to Treatment Plan  CLIENT COMMENTS / Please share your thoughts, feelings, need and/or experiences regarding your treatment plan with Staff  Please see follow up note with comments  Signatures can be found on [facility] Treatment plan consent form

## 2022-08-05 NOTE — PSYCH
REQUIRED DOCUMENTATION:     1  This service was provided via Telemedicine  2  Provider located at [facility]  3  TeleMed provider: Donelda Bumpers, MD   4  Identify all parties in room with patient during tele consult: none  5  Patient was then informed that this was a Telemedicine visit and that the exam was being conducted confidentially over secure lines  My office door was closed  No one else was in the room  Patient acknowledged consent and understanding of privacy and security of the Telemedicine visit, and gave us permission to have the assistant stay in the room in order to assist with the history and to conduct the exam   I informed the patient that I have reviewed their record in Epic and presented the opportunity for them to ask any questions regarding the visit today  The patient agreed to participate  Initial Psychiatric Evaluation- Behavioral Health   [facility], [facility]  Elizabeth Beltre 23 y o  female MRN: [id number]     [facility]     Elizabeth Beltre is a 23 y o  female with past psychiatric history of depression, anxiety is [facility] to [facility] referred by Munir Cavazos  Today Elizabeth Beltre reports she has been having a hard time recently, struggling with depression and anxiety  Started to worsen in the last 3 weeks    Psychosocial Stressors include full time student (worried about school), loss of job recently, financial stressors  Depression symptoms - mood is "depressed>>anxious"; sleep is trouble with falling asleep; appetite is decreased; low energy and motivation; decreased interest; +hopelessness, +helplessness; denies current SI - but states she does get periodic passive death wishes or sometimes active plans such as jumping out the window; denies HI; denies any recent suicide attempts  Anxiety symptoms - gets nauseous from anxiety; often times feels nauseous in the morning; panic attacks - every 2 days, lasts about 15 min; hyperventilating, shaking, feel like she can't breathe, heavy chest   No PTSD  No jaswinder  No AVH, delusions, IOR  Review Of Systems:  As in HPI otherwise negative         Past Psychiatric History:      Past Inpatient Psychiatric Treatment:   In Patient - none   Past Outpatient Psychiatric Treatment:    Sam Session  Therapist - J Luis Thomas  Past Suicide Attempts:    No   History of self harm (cutting, superficial) to turn emotional pain to physical  Past Violent Behavior:    no  Past Psychiatric Medication Trials:    Prozac, Lexapro and Wellbutrin    Past Medical History:   Diagnosis Date    Anxiety     Asthma     Depression     Head injury     High triglycerides     total triglyceride 187 repeat normal 70    Psychiatric disorder    No seizures  Head injuries - concussion with LOC, other concussions without LOC    Medications:     Current Outpatient Medications:     albuterol (PROVENTIL HFA,VENTOLIN HFA) 90 mcg/act inhaler, Inhale 1-2 puffs as needed, Disp: , Rfl:     ALPRAZolam (XANAX) 0 25 mg tablet, TAKE ONE TABLET IN THE MORNING AS NEEDED FOR ANXIETY OR PANIC ATTACK, Disp: 30 tablet, Rfl: 0    Blood Glucose Monitoring Suppl (OneTouch Verio) w/Device KIT, Use to test blood sugar three times a day, Disp: 1 kit, Rfl: 0    DULoxetine (CYMBALTA) 60 mg delayed release capsule, Take 1 capsule (60 mg total) by mouth 2 (two) times a day, Disp: 30 capsule, Rfl: 1    esomeprazole (NexIUM) 20 mg capsule, Take 1 capsule (20 mg total) by mouth daily in the early morning (Patient taking differently: Take 20 mg by mouth daily at bedtime), Disp: 30 capsule, Rfl: 9    glucose blood (OneTouch Verio) test strip, Test blood sugar three times a day, Disp: 300 each, Rfl: 3    Lancets (onetouch ultrasoft) lancets, Test blood sugar three times a day, Disp: 300 each, Rfl: 3    norgestimate-ethinyl estradiol (ORTHO-CYCLEN) 0 25-35 MG-MCG per tablet, Take 1 tablet by mouth daily, Disp: , Rfl:     Rexulti 2 MG tablet, TAKE ONE TABLET BY MOUTH EVERY DAY, Disp: 30 tablet, Rfl: 3    traZODone (DESYREL) 100 mg tablet, Take 1/2 tablet (50 mg) or 1 tablet (100 mg) as needed for sleep, Disp: 30 tablet, Rfl: 3    Family Psychiatric History:     Family History   Problem Relation Age of Onset    Anxiety disorder Mother     Depression Mother     Asthma Mother     Crohn's disease Mother     Bipolar disorder Mother     Cervical cancer Mother     Depression Maternal Aunt     Anxiety disorder Maternal Aunt     Anxiety disorder Maternal Uncle     Depression Maternal Grandfather     Hypertension Maternal Grandmother     Alcohol abuse Paternal Grandfather     Breast cancer Family      Social History:  Education: some college, in associates degree program for [employer]  Learning Disabilities: none  Marital history: single  Living arrangement, social support: The patient lives in home with parents and brother - older  Occupational History: unemployed; last job was [employer] Relationships: good support system  Gets along well with parents    Other Pertinent History: No legal or  hx    Social History     Substance and Sexual Activity   Drug Use Yes    Types: Marijuana       Traumatic History:   Abuse: none  Other Traumatic Events: none    Substance Abuse History:  Current MJ use - almost daily - smoke - no medical MJ card but is legal in [place]   Use of Alcohol: every 2 weeks, drinks 4-5 mixed drinks    Longest clean time: n/a  History of IP/OP rehabilitation program: no  Smoking history: vape nicotine - daily  Throughout the day - 4 days for each disposable cartridge  Use of Caffeine: every other day has 1 cup of coffee    Mental status:  Appearance calm and cooperative , adequate hygiene and grooming and good eye contact    Mood depressed and anxious   Affect affect appropriate    Speech a normal rate and fluent   Thought Processes coherent/organized and normal thought processes   Hallucinations no hallucinations present    Thought Content no delusions Abnormal Thoughts passive/fleeting thoughts of suicide, death wish, no homicidal thoughts  and no current SI   Orientation  oriented to person and place and time   Remote Memory short term memory intact and long term memory intact   Attention Span concentration intact   Intellect Appears to be of [address] displays adequate knowledge of current events, adequate fund of knowledge regarding past history and adequate fund of knowledge regarding vocabulary    Insight Insight intact   Judgement judgment was intact   Muscle Strength not assessed   Language no difficulty naming common objects and no difficulty repeating a phrase          Laboratory Results: I have personally reviewed all pertinent laboratory/tests results  Assessment:    Diagnoses and all orders for this visit:    Moderate episode of recurrent major depressive disorder (HCC)  -     DULoxetine (CYMBALTA) 60 mg delayed release capsule; Take 1 capsule (60 mg total) by mouth 2 (two) times a day    Generalized anxiety disorder  -     DULoxetine (CYMBALTA) 60 mg delayed release capsule; Take 1 capsule (60 mg total) by mouth 2 (two) times a day         Plan:  Medication changes   1) Increase Cymbalta to 60mg PO daily  2) Continue Rexulti, Xanax PRN, Trazodone PRN    Risks, benefits, and possible side effects of medications explained to patient and patient verbalizes understanding       Controlled Medication Discussion:   Fill Date ID   Written Drug Qty Days Prescriber Rx # Pharmacy Refill   Daily Dose* Pymt Type      07/15/2022  1   07/15/2022  Alprazolam 0 25 MG Tablet    30 00  30 [facility]   [id number]   [place] ([id number])   0   [place]   07/12/2022  1   07/12/2022  Zolpidem Tartrate 10 MG Tablet    30 00  30 [facility]   [id number]   [place] ([id number])   [place]   06/21/2022  1   06/21/2022  Alprazolam 0 25 MG Tablet    30 00  30 [facility]   [id number]   [place] ([id number])   [place]   06/14/2022  1   06/13/2022  Zolpidem Tartrate 10 MG Tablet    30 00  30 Casie Primmer   [id number]   [place] ([id number])   [place]   05/23/2022  1   05/23/2022  Alprazolam 0 25 MG Tablet    30 00  30 Casie Primmer   [id number]   [place] ([id number])   [place]   05/17/2022  1   05/12/2022  Zolpidem Tartrate 10 MG Tablet    30 00  30 Casie Primmer   [id number]   [place] ([id number])   [place]   04/19/2022  1   04/13/2022  Zolpidem Tartrate 10 MG Tablet    30 00  30 Casie Primmer   [id number]   [place] ([id number])   [place]   03/22/2022  1   03/21/2022  Zolpidem Tartrate 10 MG Tablet    30 00  30 Casie Primmer   [id number]   [place] ([id number])   [place]   03/03/2022  1   03/03/2022  Oxycodone-Acetaminophen 5-325    20 00  5 Ma Nate   [id number]   [place]uffy ([id number])   0  30 00 MME  [place]   02/24/2022  1   02/22/2022  Zolpidem Tartrate 10 MG Tablet    30 00  30 Casie Primmer   [id number]   [place] ([id number])   [place]   01/27/2022  1   01/26/2022  Zolpidem Tartrate 10 MG Tablet    30 00  30 Ge Mac   [id number]   [place] ([id number])   [place]   [place]   12/30/2021  1   12/01/2021  Zolpidem Tartrate 10 MG Tablet    30 00  30 Casie Primmer   [id number]   [place] ([id number])   [place]   12/03/2021  1   11/18/2021  Zolpidem Tartrate 10 MG Tablet    30 00  30 Acsie Primmer   [id number]   [place] ([id number])   [place]   [place]   11/05/2021  1   10/06/2021  Zolpidem Tartrate 10 MG Tablet    30 00  30 Casie Primmer   [id number]   [place] ([id number])   [place]   [place]   10/08/2021  1   10/08/2021  Zolpidem Tartrate 10 MG Tablet    30 00  30 Casie Primmer   [id number]   [place] ([id number])   0   [place]   09/10/2021  1   09/08/2021  Zolpidem Tartrate 10 MG Tablet    30 00  30 Casie Primmer   [id number]   [place] ([id number])   0   [place]   08/13/2021  1   08/13/2021  Zolpidem Tartrate 10 MG Tablet    30 00  30 Ge Mac   [id number]   [place] ([id number])   0   [place]   07/16/2021  1   07/14/2021  Zolpidem Tartrate 10 MG Tablet    30 00  30 Casie Primmer   [id number]   [place] ([id number])   0   [place]   06/18/2021  1   06/17/2021  Zolpidem Tartrate 10 MG Tablet    30 00  30 Casie Primmer   [id number]   [place] ([id number])   0   [place]   05/21/2021  1 05/21/2021  Zolpidem Tartrate 10 MG Tablet    30 00  30 [place] Locus   [id number]   [place] ([id number])   0   [place]   05/13/2021  1   05/13/2021  Acetaminophen-Cod #3 Tablet    20 00  4 Fr [facility]   [id number]   [place]uffy ([id number])   0  22 50 MME  [place]   04/21/2021  1   04/21/2021  Zolpidem Tartrate 10 MG Tablet    30 00  30 Adelia Locus   [id number]   [place] ([id number])   0   [place]   03/24/2021  1   03/20/2021  Zolpidem Tartrate 10 MG Tablet    30 00  30 [place] Locus   [id number]   [place] ([id number])   0   [place]   02/24/2021  1   02/22/2021  Zolpidem Tartrate 10 MG Tablet    30 00  30 [place] Locus   [id number]   [place] ([id number])   0   [place]   01/25/2021  1   01/25/2021  Zolpidem Tartrate 10 MG Tablet    30 00  30 [place] Locus   [id number]   [place] ([id number])   0   [place]   12/28/2020  1   12/24/2020  Zolpidem Tartrate 10 MG Tablet    30 00  30 [place] Locus   [id number]   [place] ([id number])   0   [place]   11/30/2020  1   11/30/2020  Zolpidem Tartrate 10 MG Tablet    30 00  30 [place] Locus   [id number]   [place] ([id number])   0   [place]   11/03/2020  1   11/03/2020  Zolpidem Tartrate 10 MG Tablet    30 00  30 Adelia Locus   [id number]   [place] ([id number])   0   [place]   10/05/2020  1   10/05/2020  Zolpidem Tartrate 10 MG Tablet    30 00  30 Adelia Locus   [id number]   [place] ([id number])   0   [place]   09/09/2020  1   09/09/2020  Zolpidem Tartrate 5 MG Tablet    30 00  30 Adelia Locus   [id number]   [place] ([id number])   0   [place]   08/11/2020  1   08/11/2020  Zolpidem Tartrate 5 MG Tablet    30 00  30 Colonel Agent   [id number]   [place]uffy ([id number])   [id number] [facility]           [facility] Physician's Orders     Admit to: Partial Hospitalization, 5 x per week, for 10 days  Vital signs daily for three days and then as needed  Diet Regular  Group Psychotherapy 5 x per week  Wellness Group 5 x per week  Individual Therapy as needed  Family Therapy as needed  Diagnosis:   1  Moderate episode of recurrent major depressive disorder (HCC)  DULoxetine (CYMBALTA) 60 mg delayed release capsule   2   Generalized anxiety disorder DULoxetine (CYMBALTA) 60 mg delayed release capsule         I certify that the continuation of Partial Hospitalization services is medically necessary to improve and/or maintain the patients condition and functional level, and to prevent relapse or hospitalization, and that this could not be done at a less intensive level of care  
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
## 2022-08-05 NOTE — PSYCH
Subjective:     Patient ID: Marty Ashford is a 23 y o  female  [facility] Clinical Progress Notes      Specialized Services Documentation  Therapist must complete separate progress note for each specific clinical activity in which the individual participated during the day  Case Management Note    Yelitza Mcintosh MSW, LSW    Current suicide risk : Low       [id number] Met with Marty Ashford via TEAMS  Reviewed program, initial paperwork reviewed: Consent for Treatment, PHP handbook, HIPPA, General Consent, Client Bill of Rights, and Smoking/Drug and Alcohol Policy  Release of Information obtained for emergency contact - Javier Kinney and PCP and Health Care Coordination Form  Marty Ashford has hard copies of all paperwork and verbally gave consent  Reviewed and given on call number  PCP notified of admission and health care coordination form sent  Completed initial psycho-social evaluation and initial treatment goals discussed  Medications changes/added/denied? Yes  - See Dr Hai Way Note    Treatment session number: Assessment only    Individual Case Management Visit provided today?  yes    [facility] follow up physician's orders: Admit to PHP - See Dr Hai Way Note

## 2022-08-05 NOTE — PSYCH
Virtual Regular Visit    Verification of patient location:    Patient is located in the following state in which I hold an active license [place]      Assessment/Plan:    Problem List Items Addressed This Visit        Other    Anxiety disorder    Depression - Primary          Goals addressed in session:           Reason for visit is PHP VIRTUAL GROUP DUE TO COVID-19       Encounter provider [facility] PARTIAL [address]    Provider located at [address]  [address] [facility] [address]      Recent Visits  No visits were found meeting these conditions  Showing recent visits within past 7 days and meeting all other requirements  Today's Visits  Date Type Provider Dept   08/05/22 Office Visit [facility] PARTIAL PSYCH GROUP THERAPY [facility]   Showing today's visits and meeting all other requirements  Future Appointments  No visits were found meeting these conditions  Showing future appointments within next 150 days and meeting all other requirements       The patient was identified by name and date of birth  Marichuy Carrera was informed that this is a telemedicine visit and that the visit is being conducted throughMicrosoft Teams and patient was informed that this is a secure, HIPAA-compliant platform  She agrees to proceed     My office door was closed  No one else was in the room  She acknowledged consent and understanding of privacy and security of the video platform  The patient has agreed to participate and understands they can discontinue the visit at any time  Patient is aware this is a billable service  Luigi Irwin is a 23 y o  female          HPI     Past Medical History:   Diagnosis Date    Anxiety     Asthma     Depression     Head injury     High triglycerides     total triglyceride 187 repeat normal 70    Psychiatric disorder        Past Surgical History:   Procedure Laterality Date    KNEE ARTHROSCOPY Right 2015    KNEE ARTHROSCOPY Left 2013    KNEE ARTHROSCOPY Left 2017    KNEE SURGERY      UPPER GASTROINTESTINAL ENDOSCOPY         Current Outpatient Medications   Medication Sig Dispense Refill    albuterol (PROVENTIL HFA,VENTOLIN HFA) 90 mcg/act inhaler Inhale 1-2 puffs as needed      ALPRAZolam (XANAX) 0 25 mg tablet TAKE ONE TABLET IN THE MORNING AS NEEDED FOR ANXIETY OR PANIC ATTACK 30 tablet 0    Blood Glucose Monitoring Suppl (OneTouch Verio) w/Device KIT Use to test blood sugar three times a day 1 kit 0    DULoxetine (CYMBALTA) 60 mg delayed release capsule Take 1 capsule (60 mg total) by mouth 2 (two) times a day 30 capsule 1    esomeprazole (NexIUM) 20 mg capsule Take 1 capsule (20 mg total) by mouth daily in the early morning (Patient taking differently: Take 20 mg by mouth daily at bedtime) 30 capsule 9    glucose blood (OneTouch Verio) test strip Test blood sugar three times a day 300 each 3    Lancets (onetouch ultrasoft) lancets Test blood sugar three times a day 300 each 3    norgestimate-ethinyl estradiol (ORTHO-CYCLEN) 0 25-35 MG-MCG per tablet Take 1 tablet by mouth daily      Rexulti 2 MG tablet TAKE ONE TABLET BY MOUTH EVERY DAY 30 tablet 3    traZODone (DESYREL) 100 mg tablet Take 1/2 tablet (50 mg) or 1 tablet (100 mg) as needed for sleep 30 tablet 3     No current facility-administered medications for this visit  No Known Allergies    Review of Systems    Video Exam    There were no vitals filed for this visit  Physical Exam     I spent one GROUP HOURS PLUS CASE MANAGEMENT minutes with patient today in which greater than 50% of the time was spent in counseling/coordination of care regarding PHP - SEE NOTES  VIRTUAL VISIT DISCLAIMER    Yanet Olea verbally agrees to participate in [employer]   Pt is aware that Virtual Care Services could be limited without vital signs or the ability to perform a full hands-on physical exam  Myriam Yony Mayorga understands she or the provider may request at any time to terminate the video visit and request the patient to seek care or treatment in person

## 2022-08-08 ENCOUNTER — OFFICE VISIT (OUTPATIENT)
Dept: PSYCHOLOGY | Facility: CLINIC | Age: 22
End: 2022-08-08
Payer: COMMERCIAL

## 2022-08-08 DIAGNOSIS — F33.1 MODERATE EPISODE OF RECURRENT MAJOR DEPRESSIVE DISORDER (HCC): Primary | ICD-10-CM

## 2022-08-08 DIAGNOSIS — F41.1 GENERALIZED ANXIETY DISORDER: ICD-10-CM

## 2022-08-08 PROCEDURE — G0410 GRP PSYCH PARTIAL HOSP 45-50: HCPCS

## 2022-08-08 PROCEDURE — G0176 OPPS/PHP;ACTIVITY THERAPY: HCPCS

## 2022-08-08 PROCEDURE — G0177 OPPS/PHP; TRAIN & EDUC SERV: HCPCS

## 2022-08-08 NOTE — PSYCH
Subjective:     Patient ID: Rosa Peters is a 23 y o  female  [facility] Clinical Progress Notes      Specialized Services Documentation  Therapist must complete separate progress note for each specific clinical activity in which the individual participated during the day  ALLIED THERAPY  This group was facilitated virtually in a private office using HIPAA Compliant and Approved Microsoft Teams  Rosa Peters  consented to the use of tele-video modality of treatment  [id number] Rico Davis minimally shared in [facility] group focused on motivation  Engaged in [facility] discussion and tasks exploring definition of, challenges to and ways to improve motivation  Group explored CBT and BA techniques to counteract limiting beliefs and set self up for success  She did not participate in practice of writing inclusion and counter-beliefs  She had camera on but out of view of self  Unable to assess progress toward goals  Continue AT to explore wellness tools and encourage active practice      Tx Plan Objective: 1 1, Therapist:  Gloria Roach MT-BC

## 2022-08-08 NOTE — PSYCH
Virtual Regular Visit    Verification of patient location:    Patient is located in the following state in which I hold an active license [place]      Assessment/Plan:    Problem List Items Addressed This Visit        Other    Anxiety disorder    Moderate episode of recurrent major depressive disorder ([facility] [address] ) - Primary          Goals addressed in session:           Reason for visit is PHP VIRTUAL GROUP DUE TO COVID-19      Encounter provider [facility] PARTIAL [address]    Provider located at [address] [place] [address]      Recent Visits  Date Type Provider Dept   08/05/22 Office Visit [facility] PARTIAL PSYCH GROUP THERAPY [facility]   Showing recent visits within past 7 days and meeting all other requirements  Today's Visits  Date Type Provider Dept   08/08/22 Office Visit [facility] PSYCH GROUP THERAPY [facility]   Showing today's visits and meeting all other requirements  Future Appointments  No visits were found meeting these conditions  Showing future appointments within next 150 days and meeting all other requirements       The patient was identified by name and date of birth  Tad Handing was informed that this is a telemedicine visit and that the visit is being conducted throughMicrosoft Teams and patient was informed that this is a secure, HIPAA-compliant platform  She agrees to proceed     My office door was closed  No one else was in the room  She acknowledged consent and understanding of privacy and security of the video platform  The patient has agreed to participate and understands they can discontinue the visit at any time  Patient is aware this is a billable service  Erlin Kaur is a 23 y o  female         HPI     Past Medical History:   Diagnosis Date    Anxiety     Asthma     Depression     Head injury     High triglycerides     total triglyceride 187 repeat normal 70    Psychiatric disorder        Past Surgical History:   Procedure Laterality Date    KNEE ARTHROSCOPY Right 2015    KNEE ARTHROSCOPY Left 2013    KNEE ARTHROSCOPY Left 2017    KNEE SURGERY      UPPER GASTROINTESTINAL ENDOSCOPY         Current Outpatient Medications   Medication Sig Dispense Refill    albuterol (PROVENTIL HFA,VENTOLIN HFA) 90 mcg/act inhaler Inhale 1-2 puffs as needed      ALPRAZolam (XANAX) 0 25 mg tablet TAKE ONE TABLET IN THE MORNING AS NEEDED FOR ANXIETY OR PANIC ATTACK 30 tablet 0    Blood Glucose Monitoring Suppl (OneTouch Verio) w/Device KIT Use to test blood sugar three times a day 1 kit 0    DULoxetine (CYMBALTA) 60 mg delayed release capsule Take 1 capsule (60 mg total) by mouth 2 (two) times a day 30 capsule 1    esomeprazole (NexIUM) 20 mg capsule Take 1 capsule (20 mg total) by mouth daily in the early morning (Patient taking differently: Take 20 mg by mouth daily at bedtime) 30 capsule 9    glucose blood (OneTouch Verio) test strip Test blood sugar three times a day 300 each 3    Lancets (onetouch ultrasoft) lancets Test blood sugar three times a day 300 each 3    norgestimate-ethinyl estradiol (ORTHO-CYCLEN) 0 25-35 MG-MCG per tablet Take 1 tablet by mouth daily      Rexulti 2 MG tablet TAKE ONE TABLET BY MOUTH EVERY DAY 30 tablet 3    traZODone (DESYREL) 100 mg tablet Take 1/2 tablet (50 mg) or 1 tablet (100 mg) as needed for sleep 30 tablet 3    zolpidem (AMBIEN) 10 mg tablet TAKE 1 TABLET BY MOUTH DAILY AT BEDTIME AS NEEDED FOR SLEEP 30 tablet 0     No current facility-administered medications for this visit  No Known Allergies    Review of Systems    Video Exam    There were no vitals filed for this visit  Physical Exam     I spent FOUR GROUP HOURS PLUS CASE MANAGEMENT minutes with patient today in which greater than 50% of the time was spent in counseling/coordination of care regarding PHP - SEE NOTES       VIRTUAL VISIT [id number] Luis verbally agrees to participate in [employer]  Pt is aware that Virtual Care Services could be limited without vital signs or the ability to perform a full hands-on physical Kirby Javid understands she or the provider may request at any time to terminate the video visit and request the patient to seek care or treatment in person

## 2022-08-08 NOTE — PSYCH
Subjective:     Patient ID: Aba Patel is a 23 y o  female  [facility] Clinical Progress Notes      Specialized Services Documentation  Therapist must complete separate progress note for each specific clinical activity in which the individual participated during the day  Group Psychotherapy  This group was facilitated virtually in a private office using HIPAA Compliant and Approved [employer] Teams  Aba Patel consented to the use of tele-video modality of treatment  ([id number]) Aba Patel  attended group on the [address] [place] [address] Recovery and Wellness  Group members were educated on the background of the 5 Key Facets of Recovery and Wellness exercise  First, members answered the following questions:   What does wellness mean to you?  My definition of recovery is     Today, I will support myself by    Members then focused the following aspects:   · 5 Key Facets of Recover and [address], Education, Advocacy, Responsibility, Support  · Hope-  · I can educate myself by     · Name some self advocating opportunities  · I take personal responsibility by     · My supporters are    Some effort towards progress displayed through participation and engagement in topic  Group members were encouraged to use these questions to continue on their journey to wellness      Treatment Plan Objectives: 1 1, 1 2  Therapist: Rosalba LUCAS RN

## 2022-08-08 NOTE — PSYCH
Subjective:     Patient ID: Jenifer Singh is a 23 y o  female  [facility] Clinical Progress Notes      Specialized Services Documentation  Therapist must complete separate progress note for each specific clinical activity in which the individual participated during the day  Group Psychotherapy Life Skills (2021-5457)  Jenifer Singh actively engaged in group focused on core beliefs which was facilitated virtually in a private office using HIPAA Compliant and Approved Microsoft Teams  Myriam consented to the use of tele-video modality of treatment and was virtually present for group psychotherapy today  Group members watched a short video, Healing Your Negative Core Beliefs  Clients learned and share about their core beliefs  The group discussed how core beliefs influence their thoughts and behaviors  During the activity the group learned about cognitive reframing and explored ways to reshape their negative core beliefs into positive beliefs  Group members wrote one of their negative core beliefs and had to provide three pieces of evidence that prove the negative core belief untrue  During the exercise, Yadira negative thoughts were challenged by three pieces of evidence but did not share what they were  Discussed thought records and how to use them  Clients were asked to challenge one negative thought per day  Haley Wyattlakisha continues to make progress towards goals through verbal participation in group; to accomplish long term goals continue to utilize skills learned in programming  Continue with psychotherapy to educate and encourage use of wellness tools  Tx Plan Objective: 1 1,1 2 Therapist: Melina Hare      Education Therapy   [id number] Jenifer Singh actively shared in morning assessment and goal review  Presented as Receptive related to readiness to learn  Jenifer Singh did not have a goal   did not present with any barriers to learning       200 [facility] engaged throughout the treatment day  Was engaged in learning related to Illness, Medication, Aftercare, and Wellness Tools  Staff utilized Verbal, Written, A/V, and Demonstration teaching methods  Luis Felipe Keys shared area of learning and set a goal for outside of program to shower  Tx Plan Objective: 1 1,1 2,1 4 Therapist:  FRANNY Jung      Other (5744-3521) Spoke with Kristin Kuhn from [facility] with a contact number [phone], using Tax ID [id number]  Attempted to use Location address  [place], [place]  with a [facility] location of [address], [place]  Kristin Kuhn reported that she could not find [facility] in their system under that NPI and did not think we were in network  She asked for the AET[employer] Pin and was told that the AETNA Pin was unknown  [facility] Showed up under [facility] NPI [id number]  Awaiting for clinician to call pending Sarika Marx is [phone]   Case Management Note    FRANNY Jung    Current suicide risk : Low     1400- Attempted case management  Voicemail was left requesting a return call  65- Voicemail was left for Myriam's mother in regards to firearms being locked in safe and making sure Carmela Harper does not have access to them  Return phone call requested  Phone number left on voicemail  Medications changes/added/denied? No    Treatment session number: 1    Individual Case Management Visit provided today?  Yes     [facility] follow up physician's orders: none at this time

## 2022-08-09 ENCOUNTER — OFFICE VISIT (OUTPATIENT)
Dept: PSYCHOLOGY | Facility: CLINIC | Age: 22
End: 2022-08-09
Payer: COMMERCIAL

## 2022-08-09 DIAGNOSIS — F33.1 MODERATE EPISODE OF RECURRENT MAJOR DEPRESSIVE DISORDER (HCC): Primary | ICD-10-CM

## 2022-08-09 DIAGNOSIS — F41.1 GENERALIZED ANXIETY DISORDER: ICD-10-CM

## 2022-08-09 PROCEDURE — G0177 OPPS/PHP; TRAIN & EDUC SERV: HCPCS

## 2022-08-09 PROCEDURE — G0410 GRP PSYCH PARTIAL HOSP 45-50: HCPCS

## 2022-08-09 PROCEDURE — G0176 OPPS/PHP;ACTIVITY THERAPY: HCPCS

## 2022-08-09 NOTE — PSYCH
Subjective:     Patient ID: Corrine Cortez is a 23 y o  female  [facility] Clinical Progress Notes      Specialized Services Documentation  Therapist must complete separate progress note for each specific clinical activity in which the individual participated during the day  Group Psychotherapy Life Skills  (9500-7445)  Corrine Cortez actively engaged in group focused on developing self-compassion which was facilitated virtually in a private office using HIPAA Compliant and Approved [employer] Teams  Myriam consented to the use of tele-video modality of treatment and was virtually present for group psychotherapy today  Group members discussed what is self compassion  During the group we discussed the benefits of self-compassion and how it helps improve well-being, connection to others, increases selflessness, regulates emotions, reduces depression and anxiety  Group members practiced self-compassion by completing self-compassion exercises drawn from "The Self-Compassion Deck"  Jeffrey Caro discussed how they could bring self-compassion into their lives by allowing time for self -care  Jeffrey Gordo continues to make progress towards goals through verbal participation in group; to accomplish long term goals continue to utilize skills learned in programming  Continue with psychotherapy to educate and encourage use of wellness tools  Tx Plan Objective: 1 1,1 2 Therapist: Michelle Rivera ; RAUL CosbyN RN    Other [phone] from [facility] with a contact number [phone], using Tax ID [id number]  Attempted to use Location address  [place], [place]  with a [facility] location of [address] [place] [address] [facility] [id number]  Corrine Cortez was authorized 30 days from 8/8/22 to 9/16/22 with an authorization code of [id number]   Clinician assigned to the case is Jaja Luo with contact phone number [phone]  Case Management Note    Abdiel Matos, MSW    Current suicide risk : Low     0974-4318- Myriam napped yesterday during our case management time  She reported that when her camera is on she is unable to hear the group  She does not think that group is for her  She asked what coping skills and asked for some examples of coping skills  She shared that she prefers outpatient therapy and is anxious in the group setting and is unsure if she feels comfortable sharing  [facility] said she will try group out for a few more days  [facility] reported not knowing the combination to the [facility]  Reviewed treatment plan  Informed of med check and case management schedule  She was informed that if she had any questions or concerns she could email or call this   Medications changes/added/denied? No    Treatment session number: 2    Individual Case Management Visit provided today?  Yes     [facility]ations follow up physician's orders: none at this time

## 2022-08-09 NOTE — PSYCH
Subjective:     Patient ID: Kirsten Campos is a 23 y o  female  [facility] Clinical Progress Notes      Specialized Services Documentation  Therapist must complete separate progress note for each specific clinical activity in which the individual participated during the day  Group Psychotherapy   This group was facilitated virtually in a private office using HIPAA Compliant and Approved [employer] Teams  Kirsten Campos consented to the use of tele-video modality of treatment  0923-2328 Kirsten Campos was present in psychoeducational group to develop a work wellness tool box  Group members collaborated to develop a list of tools they could use:   To prepare for work- prior to work tools   To use during work to remain focused   To decompress after work    Question and answer time allowed  Some progress towards goal noted through participation in group  Continue psychoeducational groups on coping tools use, practicing coping tools, and circumstances for their use to effectively manage stress and anxiety  Tx Plan Objective:  1 1, 1 2, 1 4 Therapist:  Jeaneth LUCAS RN      GROUP PSYCHOTHERAPY  (5884-0005) Group was facilitated virtually in a private office using HIPAA Compliant and Approved [employer] Teams  Kirsten Campos consented to the use of tele-video modality of treatment and was virtually present for group psychotherapy today  she attentively listened to Elizabeth share her life story as she co-led this session  Group encouraged power of learning about self, accepting illness and personal responsibility in recovery  Community resources reviewed in addition to personal resources like the affirmations  Progress toward goals noted  Continue psychotherapy to encourage self -awareness and healthy engagement of supports      TX Plan Objectives: 1 1, 1 2, 1 4   Therapist: Jeaneth LUCAS RN     Education Therapy   [phone] Kirsten Campos actively shared in morning assessment and goal review  Presented as Receptive related to readiness to learn  Eboni Ross did complete goal from last treatment day identifying gaining wa  did not present with any barriers to learning  532 Jonny Smith engaged throughout the treatment day  Was engaged in learning related to Illness, Medication, Aftercare, and Wellness Tools  Staff utilized Verbal, Written, A/V, and Demonstration teaching methods  Eboni Ross shared area of learning and set a goal for outside of program to take a shower, do hair, put on make up and then in AM go to gym        Tx Plan Objective: 1 1,1 2,1 4 Therapist:  David CARTERN RN

## 2022-08-09 NOTE — PSYCH
Virtual Regular Visit    Verification of patient location:    Patient is located in the following state in which I hold an active license [place]      Assessment/Plan:    Problem List Items Addressed This Visit        Other    Anxiety disorder    Moderate episode of recurrent major depressive disorder ([facility] [address] ) - Primary          Goals addressed in session:           Reason for visit is PHP VIRTUAL GROUP DUE TO COVID-19       Encounter provider [address] PARTIAL [address]    Provider located at [address] [place] [address]  [address] [facility] [id number]      Recent Visits  Date Type Provider Dept   08/08/22 Office Visit [address] PARTIAL PSYCH GROUP THERAPY [facility]   08/05/22 Office Visit [address] PARTIAL PSYCH GROUP THERAPY [facility]   Showing recent visits within past 7 days and meeting all other requirements  Today's Visits  Date Type Provider Dept   08/09/22 Office Visit [address] PARTIAL PSYCH GROUP THERAPY [facility]   Showing today's visits and meeting all other requirements  Future Appointments  No visits were found meeting these conditions  Showing future appointments within next 150 days and meeting all other requirements       The patient was identified by name and date of birth  Josy Santos was informed that this is a telemedicine visit and that the visit is being conducted throughMicrosoft Teams and patient was informed that this is a secure, HIPAA-compliant platform  She agrees to proceed     My office door was closed  No one else was in the room  She acknowledged consent and understanding of privacy and security of the video platform  The patient has agreed to participate and understands they can discontinue the visit at any time  Patient is aware this is a billable service  Jacob Apolonia is a 23 y o  female          HPI     Past Medical History:   Diagnosis Date    Anxiety     Asthma     Depression     Head injury     High triglycerides     total triglyceride 187 repeat normal 70    Psychiatric disorder        Past Surgical History:   Procedure Laterality Date    KNEE ARTHROSCOPY Right 2015    KNEE ARTHROSCOPY Left 2013    KNEE ARTHROSCOPY Left 2017    KNEE SURGERY      UPPER GASTROINTESTINAL ENDOSCOPY         Current Outpatient Medications   Medication Sig Dispense Refill    albuterol (PROVENTIL HFA,VENTOLIN HFA) 90 mcg/act inhaler Inhale 1-2 puffs as needed      ALPRAZolam (XANAX) 0 25 mg tablet TAKE ONE TABLET IN THE MORNING AS NEEDED FOR ANXIETY OR PANIC ATTACK 30 tablet 0    Blood Glucose Monitoring Suppl (OneTouch Verio) w/Device KIT Use to test blood sugar three times a day 1 kit 0    DULoxetine (CYMBALTA) 60 mg delayed release capsule Take 1 capsule (60 mg total) by mouth 2 (two) times a day 30 capsule 1    esomeprazole (NexIUM) 20 mg capsule Take 1 capsule (20 mg total) by mouth daily in the early morning (Patient taking differently: Take 20 mg by mouth daily at bedtime) 30 capsule 9    glucose blood (OneTouch Verio) test strip Test blood sugar three times a day 300 each 3    Lancets (onetouch ultrasoft) lancets Test blood sugar three times a day 300 each 3    norgestimate-ethinyl estradiol (ORTHO-CYCLEN) 0 25-35 MG-MCG per tablet Take 1 tablet by mouth daily      Rexulti 2 MG tablet TAKE ONE TABLET BY MOUTH EVERY DAY 30 tablet 3    traZODone (DESYREL) 100 mg tablet Take 1/2 tablet (50 mg) or 1 tablet (100 mg) as needed for sleep 30 tablet 3    zolpidem (AMBIEN) 10 mg tablet TAKE 1 TABLET BY MOUTH DAILY AT BEDTIME AS NEEDED FOR SLEEP 30 tablet 0     No current facility-administered medications for this visit  No Known Allergies    Review of Systems    Video Exam    There were no vitals filed for this visit      Physical Exam     I spent FOUR GROUP HOURS PLUS CASE MANAGEMENT minutes with patient today in which greater than 50% of the time was spent in counseling/coordination of care regarding PHP - SEE NOTES

## 2022-08-10 ENCOUNTER — TELEPHONE (OUTPATIENT)
Dept: PSYCHIATRY | Facility: CLINIC | Age: 22
End: 2022-08-10

## 2022-08-10 ENCOUNTER — APPOINTMENT (OUTPATIENT)
Dept: PSYCHOLOGY | Facility: CLINIC | Age: 22
End: 2022-08-10
Payer: COMMERCIAL

## 2022-08-10 ENCOUNTER — DOCUMENTATION (OUTPATIENT)
Dept: PSYCHOLOGY | Facility: CLINIC | Age: 22
End: 2022-08-10
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
## 2022-08-10 NOTE — PROGRESS NOTES
Subjective:     Patient ID: Sagar Diaz is a 23 y o  female  [facility] Clinical Progress Notes      Specialized Services Documentation  Therapist must complete separate progress note for each specific clinical activity in which the individual participated during the day  Case Management Note    FRANNY Flaherty    Current suicide risk : Unable to assess due to absence  Sagar Diaz was a no call/no show for program today 08/10/22  CM contacted Sagar Diaz at [phone]  Patric Chavez reported that she did not have internet connection and could not log into group  Patric Chavez was aware that she missed her med check  She reported not knowing if she should have called to inform us  Patric Chavez was informed during the intake to call if she could not make it into program   reminded her to call if she was not going to make it into program  Patric Chavez confirmed that she did have the phone number for the  and for the   Patric Chavez reports that they were working on fixing the internet issue   Informed of case management schedule  Medications changes/added/denied? No    Treatment session number: N/A    Individual Case Management Visit provided today? yes  Innovations follow up physician's orders: None at this time

## 2022-08-11 ENCOUNTER — APPOINTMENT (OUTPATIENT)
Dept: PSYCHOLOGY | Facility: CLINIC | Age: 22
End: 2022-08-11
Payer: COMMERCIAL

## 2022-08-11 ENCOUNTER — TELEMEDICINE (OUTPATIENT)
Dept: BEHAVIORAL/MENTAL HEALTH CLINIC | Facility: CLINIC | Age: 22
End: 2022-08-11
Payer: COMMERCIAL

## 2022-08-11 ENCOUNTER — DOCUMENTATION (OUTPATIENT)
Dept: PSYCHOLOGY | Facility: CLINIC | Age: 22
End: 2022-08-11

## 2022-08-11 DIAGNOSIS — F41.1 GENERALIZED ANXIETY DISORDER: ICD-10-CM

## 2022-08-11 DIAGNOSIS — F33.1 MODERATE EPISODE OF RECURRENT MAJOR DEPRESSIVE DISORDER (HCC): Primary | ICD-10-CM

## 2022-08-11 PROCEDURE — G0176 OPPS/PHP;ACTIVITY THERAPY: HCPCS

## 2022-08-11 PROCEDURE — G0410 GRP PSYCH PARTIAL HOSP 45-50: HCPCS

## 2022-08-11 PROCEDURE — 90834 PSYTX W PT 45 MINUTES: CPT | Performed by: SOCIAL WORKER

## 2022-08-11 NOTE — PSYCH
This note was not shared with the patient due to this is a psychotherapy note     Virtual Regular Visit    Verification of patient location:    Patient is located in the following state in which I hold an active license [place]      Assessment/Plan:    Problem List Items Addressed This Visit        Other    Anxiety disorder    Moderate episode of recurrent major depressive disorder ([facility] [address] ) - Primary          Goals addressed in session: Goal 1          Reason for visit is   Chief Complaint   Patient presents with    Virtual Regular Visit        Encounter provider Raúl Cho    Provider located at [address] [place] [address]  [address]  [place] [address]  [phone]      Recent Visits  Date Type Provider Dept   08/04/22 [address] [place] [address] [facility] Therapist Kinga   Showing recent visits within past 7 days and meeting all other requirements  Today's Visits  Date Type Provider Dept   08/11/22 Telemedicine Raúl Cho [facility] Therapist King[facility]   Showing today's visits and meeting all other requirements  Future Appointments  No visits were found meeting these conditions  Showing future appointments within next 150 days and meeting all other requirements       The patient was identified by name and date of birth  Midge Post was informed that this is a telemedicine visit and that the visit is being conducted through[employer] and patient was informed that this is a secure, HIPAA-compliant platform  She agrees to proceed     My office door was closed  No one else was in the room  She acknowledged consent and understanding of privacy and security of the video platform  The patient has agreed to participate and understands they can discontinue the visit at any time  Patient is aware this is a billable service       Psychotherapy Provided: Individual Psychotherapy 45 minutes Length of time in session: 45 minutes, follow up in 1 week    Encounter Diagnosis     ICD-10-CM    1  Moderate episode of recurrent major depressive disorder (HCC)  F33 1    2  Generalized anxiety disorder  F41 1        Goals addressed in session: Goal 1     Data: Writer engaged Ernestine Reid virtually for an individual psychotherapy session  Ernestine Reid reported that she started [facility] PHP but does not feel it is for her  Ernestineadams Reid reported that she feels unable to connect because it is a virtual platform, and also that she does not relate to the other women in the group  Ernestine Reid reported that there was a medication change which she did start  Ernestine Reid stated that she has not had suicidal thoughts for the past week and that she has not cut over the past week  Ernestine Reid stated that she does have urges to cut, but is motivated to "get better " Ernestine Reid reported that she has been journaling, drawing and going for car drives as methods of coping  Ernestine Reid also reported that she has been spending time with her friends which has been a positive distraction  Ernestine Reid reported that she is showering about 3 times a week but would like to increase this  Ernestine Reid rated her depression a 6 out of 10 and anxiety a 4 out of 10  Writer spent time exploring and processing Myriam's thoughts and feelings  Writer expressed concern regarding Myriam's recent SI and self-harm  Ernestine Reid stated that she does not want to continue with the PHP and would prefer to increase sessions with Writer to 2x per week  Writer agreed to this request, informing Ernestine Reid that Writer may not have availability each week for 2 sessions  Writer verbally safety planned with Ernestine Reid verbally agreed that if she does have suicidal thoughts she will go to the emergency room for screening  Writer encouraged continued use of healthy coping and self-care  Assessment: Ernestine Reid presented in a depressed mood with congruent affect   Ernestine Reid was well engaged and oriented X3  Eye contact was good, speech was of normal rate and tone  Thought content was normal, insight and judgement were intact  Depression 6/10; Anxiety 4/10  SI denied at time of session  Plan: Writer advised Jeffrey Caro to contact her  at [facility] to inform her of her decision to stop going to [facility]  Writer also advised Jeffrey Caro to come into the office to sign a release of information for Writer to be able to communicate with her mother who is a main support for Jeffrey Caro  Current suicide risk : Low     Behavioral Health Treatment Plan  Luke: Diagnosis and Treatment Plan explained to Carlos Mckeonkaiser relates understanding diagnosis and is agreeable to Treatment Plan   Yes     I spent 45 minutes directly with the patient during this visit

## 2022-08-11 NOTE — PROGRESS NOTES
Subjective:     Patient ID: Leslie Mo is a 23 y o  female  [facility] Clinical Progress Notes      Specialized Services Documentation  Therapist must complete separate progress note for each specific clinical activity in which the individual participated during the day  Case Management Note    Josef Yoder MSW, LSW    Current suicide risk : Low     (3802-5821) VANGIE requested to be dc from the program because she did not feel like she was making connections with others in group and did not like the virtual format  She reported that she met with her therapist this morning and will be seeing her twice a week and has a plan if she gets worse  She plans to go to the ED if she has SI again  She reported that she has not had SI in a week and has no plan or intent  VANGIE stated her next appointment is with her therapist is next Thursday  Medications changes/added/denied? No    Treatment session number: na    Individual Case Management Visit provided today?  Yes     [facility]ations follow up physician's orders: Discharge AMA per patient request - See Dr John Moment Note

## 2022-08-12 ENCOUNTER — APPOINTMENT (OUTPATIENT)
Dept: PSYCHOLOGY | Facility: CLINIC | Age: 22
End: 2022-08-12
Payer: COMMERCIAL

## 2022-08-17 ENCOUNTER — DOCUMENTATION (OUTPATIENT)
Dept: PSYCHOLOGY | Facility: CLINIC | Age: 22
End: 2022-08-17

## 2022-08-17 DIAGNOSIS — F33.1 MODERATE EPISODE OF RECURRENT MAJOR DEPRESSIVE DISORDER (HCC): Primary | ICD-10-CM

## 2022-08-17 DIAGNOSIS — F41.1 GENERALIZED ANXIETY DISORDER: ICD-10-CM

## 2022-08-18 ENCOUNTER — TELEMEDICINE (OUTPATIENT)
Dept: BEHAVIORAL/MENTAL HEALTH CLINIC | Facility: CLINIC | Age: 22
End: 2022-08-18
Payer: COMMERCIAL

## 2022-08-18 DIAGNOSIS — F41.1 GENERALIZED ANXIETY DISORDER: ICD-10-CM

## 2022-08-18 DIAGNOSIS — F33.1 MODERATE EPISODE OF RECURRENT MAJOR DEPRESSIVE DISORDER (HCC): Primary | ICD-10-CM

## 2022-08-18 PROCEDURE — 90832 PSYTX W PT 30 MINUTES: CPT | Performed by: SOCIAL WORKER
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
## 2022-08-18 NOTE — PROGRESS NOTES
Assessment/Plan:       Diagnoses and all orders for this visit:     Moderate episode of recurrent major depressive disorder (HCC)     Generalized anxiety disorder            Subjective:      Patient ID: Mavis Torres is a 23 y o  female      [facility] Treatment Plan   AREAS OF NEED: Depression and Anxiety as evidenced by self harming, trouble with falling asleep, loss of appetite, lack of energy and motivation, decreased interest, decrease in ADL's, feeling hopeless, feeling useless, SI but no intent, passive death wishes, nauseous to the point of vomiting,body shaking, hyperventilating, feeling like she can't breath, weight on chest, panic attacks, worrying and ruminating        Date Initiated: 08/05/22     Strengths:      "Smart, good at school, caring, good friend, responsible, respectful"     LONG TERM GOAL:   Date Initiated: 08/05/22  1 0 I will identify 3 signs that I am feeling less depressed and anxious and more productive in my day to day life  Target Date: 9/2/22  Completion Date: 8/11/22 - discharge AMA per patient requests         SHORT TERM OBJECTIVES:      Date Initiated: 08/05/22  1 1 I will create a list identifying coping skills that I have learned and discuss how I plan to build them into my schedule  I will choose at least 1 different coping skill daily to practice and will journal about the challenges and success with implementing it each skill    Revision Date: 8/11/22 - discharge AMA per patient requests  Target Date: 8/17/22  Completion Date: 8/11/22 - discharge AMA per patient requests     Date Initiated: 08/05/22  1 2 I will learn and engage in at least one self-care technique daily to improve my depressive symptoms and share my experience    Revision Date: 8/11/22 - discharge AMA per patient requests  Target Date: 8/17/22  Completion Date:8/11/22 - discharge AMA per patient requests     Date Initiated: 08/05/22  1 3 I will take medications as prescribed and share questions and concerns if arise  Revision Date:8/11/22 - discharge AMA per patient requests  Target Date: 8/17/22  Completion Date: 8/11/22 - discharge AMA per patient requests     Date Initiated: 08/05/22  1 4 I will identify 3 ways my supports can assist in my recovery and agree to staff/support contact as indicated  Revision Date:8/11/22 - discharge AMA per patient requests  Target Date: 8/17/22  Completion Date:8/11/22 - discharge AMA per patient requests            7 DAY REVISION:     Date Initiated:  Revision Date:   Target Date:   Completion Date:        PSYCHIATRY:  Date Initiated:  08/05/22  Medication Management and Education       Revision Date: 8/11/22 - discharge AMA per patient requests      The person(s) responsible for carrying out the plan is Erlin Mcelroy MD     NURSING/SYMPTOM EDUCATION:   Date Initiated: 08/05/22  1 1, 1 2  1 3, 1 4 This RN/Or Therapist will provide wellness/symptoms and skill education groups three to five days weekly to educate Deward Arts on signs and symptoms of diagnoses, skills to manage, and medication questions that will be addressed by the treatment team     Revision date:8/11/22 - discharge AMA per patient requests  The person(s) responsible for carrying out the plan is FRANNY Aldrich, NATALIA ; Elizabeth Major RN, BSN     PSYCHOLOGY:   Date Initiated: 08/05/22       1 1, 1 2, 1 4 Provide psychotherapy group 5 times per week to allow opportunity for Deward Arts  to explore stressors and ways of coping  Revision Date: 8/11/22 - discharge AMA per patient requests  The person(s) responsible for carrying out the plan is FRANNY Aldrich,NATALIA     ALLIED THERAPY:   Date Initiated: 08/05/22  1 1,1 2 Jared Ayala in AT group 5 times weekly to encourage development and use of wellness tools to decrease symptoms and promote recovery through meaningful activity    Revision Date: 8/11/22 - discharge AMA per patient requests      The person(s) responsible for carrying out the plan is NINOSKA Titus ; NINOSKA Meeks     CASE MANAGEMENT:   Date Initiated: 08/05/22      1 0 This  will meet with Iwona Friend  3-4 times weekly to assess treatment progress, discharge planning, connection to community supports and UR as indicated  Revision Date: 8/11/22 - discharge AMA per patient requests  The person(s) responsible for carrying out the plan is Ulysses Kinder     TREATMENT REVIEW/COMMENTS:      DISCHARGE CRITERIA: Identify 3 signs of progress and complete relapse prevention plan  DISCHARGE PLAN: Connect with identified outpatient providers     Estimated Length of Stay: 10 treatment days                Diagnosis and Treatment Plan explained to Antonieta Martins relates understanding diagnosis and is agreeable to Treatment Plan          CLIENT COMMENTS / Please share your thoughts, feelings, need and/or experiences regarding your treatment plan with Staff  Wily Smith see follow up note with comments      Signatures can be found on [facility] Treatment plan consent form

## 2022-08-18 NOTE — PSYCH
This note was not shared with the patient due to this is a psychotherapy note    Virtual Regular Visit    Verification of patient location:    Patient is located in the following state in which I hold an active license [place]      Assessment/Plan:    Problem List Items Addressed This Visit        Other    Anxiety disorder    Moderate episode of recurrent major depressive disorder ([facility] [address] ) - Primary          Goals addressed in session: Goal 1          Reason for visit is   Chief Complaint   Patient presents with    Virtual Regular Visit        Encounter provider Jordyn Carvajal    Provider located at [address] [place] [address]  [address]  [place] [address]  [phone]      Recent Visits  Date Type Provider Dept   08/11/22 [address] [place] [address] [facility] Therapist [place]   Showing recent visits within past 7 days and meeting all other requirements  Today's Visits  Date Type Provider Dept   08/18/22 Telemedicine Jordyn Carvajal [facility] Therapist [place]   Showing today's visits and meeting all other requirements  Future Appointments  No visits were found meeting these conditions  Showing future appointments within next 150 days and meeting all other requirements       The patient was identified by name and date of birth  Rosa Peters was informed that this is a telemedicine visit and that the visit is being conducted through[facility] and patient was informed that this is a secure, HIPAA-compliant platform  She agrees to proceed     My office door was closed  No one else was in the room  She acknowledged consent and understanding of privacy and security of the video platform  The patient has agreed to participate and understands they can discontinue the visit at any time  Patient is aware this is a billable service       Psychotherapy Provided: Individual Psychotherapy 30 minutes Length of time in session: 30 minutes, follow up in 1 week    Encounter Diagnosis     ICD-10-CM    1  Moderate episode of recurrent major depressive disorder (HCC)  F33 1    2  Generalized anxiety disorder  F41 1        Goals addressed in session: Goal 1     Data: Writer engaged Lars Taylor virtually for an individual psychotherapy session  Lars Taylor reported that she has been feeling some relief over the past week from her depression symptoms  Lars Taylor reported that she has not had urges to self-harm or suicidal ideation over the past week  Lars Taylor reported that she believes medication is helping, as well as her efforts with self-care  Lars Taylor reported that she has been spending time with friends which has also been helpful  Lars Taylor reported that she has been looking at potential job opportunities as she has been frustrated with not having her own money  Writer explored Myriam's readiness to start working  Lars Taylor reported that she believes she will be able to take on a job and intends to limit her availability to about three days a week so that she can "ease in " Lars Taylor also discussed balancing work with school as the start of the semester is in a few weeks  Writer acknowledged the positive nature of Myriam's motivation and advised her to be mindful of her stress level as she takes on more responsibility  Writer and Lars Taylor discussed self-care and healthy coping  Writer utilized CBT techniques throughout session  Assessment: Lars Taylor presented in a neutral mood with congruent affect  Lars Taylor was engaged and oriented X3  Eye contact was good, speech was of normal rate and tone  Thought content was normal, insight and judgement were intact  Depression and anxiety symptoms have decreased as a result of medication changes and active coping by Lars Lawrencein  SI denied  HI not reported or observed during session  Plan: Continue with individual psychotherapy      Current suicide risk : Low     Behavioral Health Treatment Plan [facility]: Diagnosis and Treatment Plan explained to Antonieta Martins relates understanding diagnosis and is agreeable to Treatment Plan   Yes     I spent 30 minutes directly with the patient during this visit

## 2022-08-25 ENCOUNTER — TELEMEDICINE (OUTPATIENT)
Dept: BEHAVIORAL/MENTAL HEALTH CLINIC | Facility: CLINIC | Age: 22
End: 2022-08-25

## 2022-08-25 DIAGNOSIS — Z53.29 FAILURE TO ATTEND APPOINTMENT: Primary | ICD-10-CM

## 2022-08-25 PROCEDURE — NOSHOW: Performed by: SOCIAL WORKER

## 2022-08-25 NOTE — PSYCH
No Call  No Show  No Charge    Jenifer Soniaguillermina no showed 08/25/22 appointment , staff called and left message to reschedule appointment     Treatment Plan not due at this session

## 2022-08-29 ENCOUNTER — TELEPHONE (OUTPATIENT)
Dept: PSYCHIATRY | Facility: CLINIC | Age: 22
End: 2022-08-29

## 2022-08-29 DIAGNOSIS — F99 INSOMNIA DUE TO OTHER MENTAL DISORDER: ICD-10-CM

## 2022-08-29 DIAGNOSIS — F33.1 MODERATE EPISODE OF RECURRENT MAJOR DEPRESSIVE DISORDER (HCC): ICD-10-CM

## 2022-08-29 DIAGNOSIS — F41.1 GENERALIZED ANXIETY DISORDER: ICD-10-CM

## 2022-08-29 DIAGNOSIS — F51.05 INSOMNIA DUE TO OTHER MENTAL DISORDER: ICD-10-CM

## 2022-08-29 RX ORDER — DULOXETIN HYDROCHLORIDE 60 MG/1
60 CAPSULE, DELAYED RELEASE ORAL 2 TIMES DAILY
Qty: 60 CAPSULE | Refills: 1 | Status: SHIPPED | OUTPATIENT
Start: 2022-08-29

## 2022-08-29 RX ORDER — ZOLPIDEM TARTRATE 10 MG/1
10 TABLET ORAL
Qty: 30 TABLET | Refills: 0 | Status: SHIPPED | OUTPATIENT
Start: 2022-08-29 | End: 2022-10-03

## 2022-08-29 NOTE — TELEPHONE ENCOUNTER
Side effects from cymbalta, or trazodone she is not sure since they are both new to her,  She is having severe muscle cramping  Been happening a week or two after starting medication   Please advise

## 2022-09-07 ENCOUNTER — TELEMEDICINE (OUTPATIENT)
Dept: BEHAVIORAL/MENTAL HEALTH CLINIC | Facility: CLINIC | Age: 22
End: 2022-09-07
Payer: COMMERCIAL

## 2022-09-07 DIAGNOSIS — F33.1 MODERATE EPISODE OF RECURRENT MAJOR DEPRESSIVE DISORDER (HCC): Primary | ICD-10-CM

## 2022-09-07 DIAGNOSIS — F41.1 GENERALIZED ANXIETY DISORDER: ICD-10-CM

## 2022-09-07 PROCEDURE — 90832 PSYTX W PT 30 MINUTES: CPT | Performed by: SOCIAL WORKER

## 2022-09-07 NOTE — PSYCH
This note was not shared with the patient due to this is a psychotherapy note    Virtual Regular Visit    Verification of patient location:    Patient is located in the following state in which I hold an active license [place]      Assessment/Plan:    Problem List Items Addressed This Visit        Other    Anxiety disorder    Moderate episode of recurrent major depressive disorder ([facility] [address] ) - Primary          Goals addressed in session: Goal 1 and Goal 2          Reason for visit is   Chief Complaint   Patient presents with    Virtual Regular Visit        Encounter provider Kim Ross    Provider located at [address]  [place] [address]  [phone]      Recent Visits  No visits were found meeting these conditions  Showing recent visits within past 7 days and meeting all other requirements  Today's Visits  Date Type Provider Dept   09/07/22 [address] [place] [address] [facility] [place]   Showing today's visits and meeting all other requirements  Future Appointments  No visits were found meeting these conditions  Showing future appointments within next 150 days and meeting all other requirements       The patient was identified by name and date of birth  Gerber Erm was informed that this is a telemedicine visit and that the visit is being conducted [facility] and patient was informed that this is a secure, HIPAA-compliant platform  She agrees to proceed     My office door was closed  No one else was in the room  She acknowledged consent and understanding of privacy and security of the video platform  The patient has agreed to participate and understands they can discontinue the visit at any time  Patient is aware this is a billable service       Psychotherapy Provided: Individual Psychotherapy 35 minutes     Length of time in session: 35 minutes, follow up in 1 week    Encounter Diagnosis     ICD-10-CM    1  Moderate episode of recurrent major depressive disorder (HCC)  F33 1    2  Generalized anxiety disorder  F41 1        Goals addressed in session: Goal 1 and Goal 2     Data:  Writer engaged Vinny, virtually, for an individual psychotherapy session  Vinny reported that she has been feeling improvement with her overall mood, stating that she is no longer having thoughts of self-harm and suicide  Vinny reported decreased anxiety as well, identifying only minor anxiety related to her starting the new semester at school  Vinny reported that she has been spending most of her time with friends which has been positive  Vinny reported that in addition to starting school, she re-applied to her previous part time job and hopes to start soon  Vinny reported feeling ready to "get back into a routine " Myriam informed Writer that she has been smoking marijuana daily, usually at night, and that this has been a significant help with depression and anxiety symptoms  Writer spent time exploring this with Vinny, discussing pros and cons and ensuring Vinny is making responsible, healthy decisions with her marijuana use  Writer engaged Vinny with reviewing and updating her treatment plan  Vinny identified active use of self-care to improve self-worth which she identified as helpful  Writer encouraged continued effort toward treatment goals  Assessment: Vinny presented in a neutral mood with congruent affect  Vinny was engaged and oriented X3  Eye contact was good, speech was of normal rate and tone  Thought content was normal, insight and judgement were intact  Mild-moderate depress and anxiety remain present but managed with consistent use of prescribed medication and self-care/coping  SI denied during session  HI not reported or observed during session  Plan: Continue with individual psychotherapy   Writer reviewed attendance policy with Jane Georges and informed her that in the event that she "no shows" for another appointment, future appointments will be removed from the schedule and she will have to schedule week to week until consistency is established; Jane Georges expressed understanding  Current suicide risk : Low     Behavioral Health Treatment Plan [facility]: Diagnosis and Treatment Plan explained to Pamela Scott relates understanding diagnosis and is agreeable to Treatment Plan   Yes     I spent 35 minutes directly with the patient during this visit

## 2022-09-07 NOTE — BH TREATMENT PLAN
Remedios Soto  2000         Date of Initial Treatment Plan: 5/12/2021   Date of Current Treatment Plan: 9/7/22     Treatment Plan Number 5      Strengths/Personal Resources for Self Care: good listener, empathetic to others, responsible       Diagnosis:   1  Moderate episode of recurrent major depressive disorder ([address] )      2  Anxiety disorder, unspecified type            Area of Needs: improving self-regard, improving ability to cope with depression and anxiety         Long Term Goal 1: "I just want to feel normal "     Target Date: 12/7/22  Completion Date: TBD         Short Term Objectives for Goal 1:               A  Alex Dent will explore self during therapy sessions  ( ongoing as of 4/14/22; 9/7/22)              B  Alex Dent will identify changes to improve self-regard  (achieved, ongoing as of 4/16/22; 9/7/22)              C  Alex Dent will implement changes to improve self-regard  (achieved, ongoing as of 4/16/22; 9/7/22)              Destiney Pinzon will engage in healthy self-care as a means prompting a healthier view of self  (addended to "self-care" as of 9/7/22)     Long Term Goal 2: Alex Dent will utilize healthy coping skills to decrease depression and anxiety      Target Date: 7/14/22  Completion Date: TBD     Short Term Objectives for Goal 2:           Y  Alex Dent will identify current methods of coping with depression and anxiety  (achieved as of 9/1/21)          B  Alex Sanhire will receive psycho-education on healthy coping skills  (achieved as of 1/6/22)          C  Alex Dent will implement learned skills to assist with decreasing depression and anxiety  (achieved as of 1/6/22; ongoing)          D   Alex Dent will identify at least two coping skills used to decrease depression and anxiety during session (achieved; ongoing as 9/7/22)      GOAL 1: Modality: Individual 4x per month   Completion Date TBD, Medication Management and The person(s) responsible for carrying out the plan is  Myriam, Therapist, Med Provider      GOAL 2: Modality: Individual 4x per month   Completion Date TBD, Medication Management and The person(s) responsible for carrying out the plan is  Myriam Therapist, Med Provider          [facility]: Diagnosis and Treatment Plan explained to Myriam Miguel relates understanding diagnosis and is agreeable to Treatment Plan          Client Comments : Please share your thoughts, feelings, need and/or experiences regarding your treatment plan: "ok "    Marty Ashford, 2000, actively participated in the review and update of this treatment plan during a virtual session, using the [employer] platform  Marty Ashford  provided verbal consent on 9/7/2022 at 9:13 AM  The treatment plan was transcribed into the [employer] Record at a later time

## 2022-09-28 ENCOUNTER — TELEMEDICINE (OUTPATIENT)
Dept: BEHAVIORAL/MENTAL HEALTH CLINIC | Facility: CLINIC | Age: 22
End: 2022-09-28
Payer: COMMERCIAL

## 2022-09-28 DIAGNOSIS — F41.1 GENERALIZED ANXIETY DISORDER: ICD-10-CM

## 2022-09-28 DIAGNOSIS — F33.1 MODERATE EPISODE OF RECURRENT MAJOR DEPRESSIVE DISORDER (HCC): Primary | ICD-10-CM

## 2022-09-28 PROCEDURE — 90834 PSYTX W PT 45 MINUTES: CPT | Performed by: SOCIAL WORKER

## 2022-09-28 NOTE — PSYCH
This note was not shared with the patient due to this is a psychotherapy note    Virtual Regular Visit    Verification of patient location:    Patient is located in the following state in which I hold an active license [place]      Assessment/Plan:    Problem List Items Addressed This Visit        Other    Anxiety disorder    Moderate episode of recurrent major depressive disorder ([facility] [address] ) - Primary          Goals addressed in session: Goal 1          Reason for visit is   Chief Complaint   Patient presents with    Virtual Regular Visit        Encounter provider Johanny Dalton    Provider located at [address]  [place] [address]  [phone]      Recent Visits  No visits were found meeting these conditions  Showing recent visits within past 7 days and meeting all other requirements  Today's Visits  Date Type Provider Dept   09/28/22 Telemedicine Johanny Dalton [facility]   Showing today's visits and meeting all other requirements  Future Appointments  No visits were found meeting these conditions  Showing future appointments within next 150 days and meeting all other requirements       The patient was identified by name and date of birth  Leslie Cancer was informed that this is a telemedicine visit and that [facility] and patient was informed that this is a secure, HIPAA-compliant platform  She agrees to proceed     My office door was closed  No one else was in the room  She acknowledged consent and understanding of privacy and security of the video platform  The patient has agreed to participate and understands they can discontinue the visit at any time  Patient is aware this is a billable service       Psychotherapy Provided: Individual Psychotherapy 45 minutes     Length of time in session: 45 minutes, follow up in 1 week    Encounter Diagnosis     ICD-10-CM    1  Moderate episode of recurrent major depressive disorder (HCC)  F33 1    2  Generalized anxiety disorder  F41 1        Goals addressed in session: Goal 1     Data: Writer engaged Vinny, virtually, for an individual psychotherapy session  Vinny reported that she started school and has gone back to working part time  Vinny reported that she is still adjusting, but believes that she is doing well with her new routine  Vinny reported that her mood has been stable with only a few "bad days" recently  Vinny reported that her motivation has also increased  Vinny discussed cleaning her bedroom which she had neglected for "months " Vinny reported stress related to her parents as, her father in particular, has expressed that he would like to see Vinny working more  Writer spent time exploring and processing Myriam's thoughts and feelings related to her father's expectations  Vinny identified that she is working to create balance in her life without overwhelming herself  Vinny reported that she does not believe her father understands the extent of her mental health challenges, as well as her physical challenges  Vinny reported that she has tried to communicate this to her father, but has been unsuccessful  Writealy and Vinny discussed healthy communication  Writer also offered to facilitate a family session with Vinny and her parents to assist with increasing understanding; Vinny was open to this idea  Writer offered [employer] and support for her current efforts  Writer encouraged continued self-care and healthy coping  Assessment: Vinny presented in neutral mood with congruent affect  Vinny was well engaged and oriented x3  Eye contact was good, speech was of normal rate and tone  Thought content was normal; insight and judgment were intact   Writer observes decreased symptoms of depression and anxiety as evidenced by Myriam's verbalization and presentation during session  SI/HI denied  Plan: Continue with individual psychotherapy  Current suicide risk : Low     Behavioral Health Treatment Plan [facility]: Diagnosis and Treatment Plan explained to Demar Yeboah relates understanding diagnosis and is agreeable to Treatment Plan   Yes      I spent 45 minutes directly with the patient during this visit

## 2022-10-02 DIAGNOSIS — F51.05 INSOMNIA DUE TO OTHER MENTAL DISORDER: ICD-10-CM

## 2022-10-02 DIAGNOSIS — F99 INSOMNIA DUE TO OTHER MENTAL DISORDER: ICD-10-CM

## 2022-10-03 RX ORDER — ZOLPIDEM TARTRATE 10 MG/1
TABLET ORAL
Qty: 30 TABLET | Refills: 0 | Status: SHIPPED | OUTPATIENT
Start: 2022-10-03

## 2022-10-05 ENCOUNTER — TELEMEDICINE (OUTPATIENT)
Dept: BEHAVIORAL/MENTAL HEALTH CLINIC | Facility: CLINIC | Age: 22
End: 2022-10-05
Payer: COMMERCIAL

## 2022-10-05 DIAGNOSIS — F33.1 MODERATE EPISODE OF RECURRENT MAJOR DEPRESSIVE DISORDER (HCC): Primary | ICD-10-CM

## 2022-10-05 DIAGNOSIS — F41.1 GENERALIZED ANXIETY DISORDER: ICD-10-CM

## 2022-10-05 PROCEDURE — 90832 PSYTX W PT 30 MINUTES: CPT | Performed by: SOCIAL WORKER

## 2022-10-05 NOTE — PSYCH
This note was not shared with the patient due to this is a psychotherapy note    Virtual Regular Visit    Verification of patient location:    Patient is located in the following state in which I hold an active license [place]      Assessment/Plan:    Problem List Items Addressed This Visit        Other    Anxiety disorder    Moderate episode of recurrent major depressive disorder ([facility] [address] ) - Primary          Goals addressed in session: Goal 1 and Goal 2          Reason for visit is   Chief Complaint   Patient presents with    Virtual Regular Visit        Encounter provider Shirin Clark    Provider located at [address] [place], [place] [address]  [place] [address]  [phone]      Recent Visits  Date Type Provider Dept   09/28/22 [address] [place] [address] [facility]   Showing recent visits within past 7 days and meeting all other requirements  Today's Visits  Date Type Provider Dept   10/05/22 Telemedicine Shirin Clark [facility]   Showing today's visits and meeting all other requirements  Future Appointments  No visits were found meeting these conditions  Showing future appointments within next 150 days and meeting all other requirements       The patient was identified by name and date of birth  Sagar Diaz was informed that this is a telemedicine visit and that the [facility] and patient was informed that this is a secure, HIPAA-compliant platform  She agrees to proceed     My office door was closed  No one else was in the room  She acknowledged consent and understanding of privacy and security of the video platform  The patient has agreed to participate and understands they can discontinue the visit at any time  Patient is aware this is a billable service       Psychotherapy Provided: Individual Psychotherapy 30 minutes     Length of time in session: 30 minutes, follow up in 1 week    Encounter Diagnosis     ICD-10-CM    1  Moderate episode of recurrent major depressive disorder (HCC)  F33 1    2  Generalized anxiety disorder  F41 1        Goals addressed in session: Goal 1 and Goal 2     Data: Writer engaged Severiano Gouty, virtually, for an individual psychotherapy session  Severiano Gouty reported no significant stressors over the past week; however, she continues to deal with stress from her parents related to how much Severiano Gouty works  Severiano Gouty reported that she has not talked with her parents to address the problem as she is unsure of what to say  Writer spent time exploring this with Severiano Gouty and assisting with identifying what she would like to communicate to her parents, and how to effectively do that  Writer and Severiano Gouty also discussed budgeting  Severiano Gouty went on to discuss school, reporting lack of motivation at times to complete her assignments  Severiano Gouty reported that she believes the lack of motivation is due to a lack of interest in some classes  Writer validated Myriam's feelings and discussed the reality of having to complete certain core classes as part of her education  Writer and Severiano Gouty discussed setting aside time each day for school work to make this more of a routine part of her day  Severiano Gouty reported that she was in a "weird mood" at the time of session and very "tired " Severiano Gouty chose to end session early  Assessment: Severiano Gouty presented to session in a depressed mood with congruent affect  Severiano Gouty was engaged and remained oriented X3 throughout session  Eye contact was fair  Speech was of normal rate and tone  Thought content was normal  Insight and judgement were observed to be fair  SI/HI denied during session  Plan: Continue with individual psychotherapy      Current suicide risk : Low     Behavioral Health Treatment Plan [facility]: Diagnosis and Treatment Plan explained to Jane Reyes relates understanding diagnosis and is agreeable to Treatment Plan   Yes      I spent 30 minutes directly with the patient during this visit

## 2022-10-12 ENCOUNTER — TELEMEDICINE (OUTPATIENT)
Dept: BEHAVIORAL/MENTAL HEALTH CLINIC | Facility: CLINIC | Age: 22
End: 2022-10-12
Payer: COMMERCIAL

## 2022-10-12 ENCOUNTER — HOSPITAL ENCOUNTER (EMERGENCY)
Facility: HOSPITAL | Age: 22
Discharge: HOME | End: 2022-10-13
Attending: EMERGENCY MEDICINE
Payer: COMMERCIAL

## 2022-10-12 VITALS
BODY MASS INDEX: 45.03 KG/M2 | HEART RATE: 98 BPM | TEMPERATURE: 98.9 F | RESPIRATION RATE: 16 BRPM | DIASTOLIC BLOOD PRESSURE: 94 MMHG | WEIGHT: 262.35 LBS | SYSTOLIC BLOOD PRESSURE: 140 MMHG | OXYGEN SATURATION: 99 %

## 2022-10-12 DIAGNOSIS — K13.79 MOUTH PAIN: Primary | ICD-10-CM

## 2022-10-12 DIAGNOSIS — F41.1 GENERALIZED ANXIETY DISORDER: ICD-10-CM

## 2022-10-12 DIAGNOSIS — F33.1 MODERATE EPISODE OF RECURRENT MAJOR DEPRESSIVE DISORDER (HCC): Primary | ICD-10-CM

## 2022-10-12 PROCEDURE — 99282 EMERGENCY DEPT VISIT SF MDM: CPT | Performed by: EMERGENCY MEDICINE

## 2022-10-12 PROCEDURE — 99282 EMERGENCY DEPT VISIT SF MDM: CPT

## 2022-10-12 PROCEDURE — 90832 PSYTX W PT 30 MINUTES: CPT | Performed by: SOCIAL WORKER

## 2022-10-12 NOTE — PSYCH
This note was not shared with the patient due to this is a psychotherapy note    Virtual Regular Visit    Verification of patient location:    Patient is located in the following state in which I hold an active license [place]      Assessment/Plan:    Problem List Items Addressed This Visit        Other    Anxiety disorder    Moderate episode of recurrent major depressive disorder ([facility] [address] ) - Primary          Goals addressed in session: Goal 1          Reason for visit is   Chief Complaint   Patient presents with   • Virtual Regular Visit        Encounter provider Cullen Mejía    Provider located at [address] [place] [address]  [address]  [place] [address]  [phone]      Recent Visits  Date Type Provider Dept   10/05/22 [address] [place] [address] [facility]   Showing recent visits within past 7 days and meeting all other requirements  Today's Visits  Date Type Provider Dept   10/12/22 Telemedicine Cullen Mejía [facility]   Showing today's visits and meeting all other requirements  Future Appointments  No visits were found meeting these conditions  Showing future appointments within next 150 days and meeting all other requirements       The patient was identified by name and date of birth  Kirsten Campos was informed that this is a telemedicine visit and that the visit is being conducted through[employer] [facility] [employer] and patient was informed that this is a secure, HIPAA-compliant platform  She agrees to proceed     My office door was closed  No one else was in the room  She acknowledged consent and understanding of privacy and security of the video platform  The patient has agreed to participate and understands they can discontinue the visit at any time  Patient is aware this is a billable service       Psychotherapy Provided: Individual Psychotherapy 34 minutes Length of time in session: 34 minutes, follow up in 1 week    Encounter Diagnosis     ICD-10-CM    1  Moderate episode of recurrent major depressive disorder (HCC)  F33 1    2  Generalized anxiety disorder  F41 1        Goals addressed in session: Goal 1     Data: Writer engaged katey Chung, for an individual psychotherapy session  Barbara Sami reported that she has been "busy " Barbara Gallardo reported that she has been experiencing pain where she had her wisdom teeth removed which has been a source of stress  Barbara Gallardo reported that she does not have an appointment with the dentist until next week  Barbara Gallardo went on to report that she is doing well in some of her classes and not well in others  Barbara Gallardo reported two classes in particular that she is behind in  Barbara Gallardo reported that she simply has not been motivated to complete the work  Barbara Gallardo reported that she did not follow through with choosing a time during the day to focus on school work as discussed last session  Barbara Gallardo reported feeling unsure of why she is unmotivated recently and could not think of any possible contributing factors  Barbara Gallardo reported no stress in social relationships  Barbara Gallardo reported some stress related to her parents as they are frequently talking to her about ensuring she is completing school work and going to work  Barbara Gallardo reported that her mood has been somewhat irritable  Barbara Gallardo reported that she does miss her morning dose of Cymbalta at times, reporting that she just "forgets" despite having the medication right at her bedside to take when she wakes up  Writer expressed concern regarding Myriam's report and agreed that Myriam's presentation during sessions is less engaged and motivated as she has been in the past  Writer advised Barbara Gallardo to schedule an appointment with Dr Ross Thorpe  Writer encouraged taking medication as prescribed   Writer assisted Barbara Gallardo with identifying a specific day that she will catch up on school work - this coming Sunday  Assessment: Barbara Gallardo presented in a depressed mood with congruent affect  Barbara Gallardo was engaged and oriented X3  Eye contact was minimal  Speech was of normal rate and tone  Thought content was normal  Insight and judgement were fair  Writer observes low motivation, distractibility and a general careless attitude  SI/HI not reported or observed during session  Plan: Continue with individual psychotherapy  Current suicide risk : Low     Behavioral Health Treatment Plan [facility]: Diagnosis and Treatment Plan explained to Brenda Moore relates understanding diagnosis and is agreeable to Treatment Plan  Yes      I spent 34 minutes directly with the patient during this visit     TIME IN: 10:02am  TIME OUT: 10:36am  TOTAL SESSION MINS: 34 mins - Client ended session early due to pain in her mouth

## 2022-10-13 NOTE — ED PROVIDER NOTES
History  Chief Complaint   Patient presents with   • Dental Pain     States wisdom teeth removed several years ago  Thinks they are coming back and are very painful  States otc remedies not helping     26-year-old female, presents with pain in mouth  Patient states that she noticed small areas of discoloration and pain to bilateral lower gums where her wisdom teeth were removed several years ago  Patient states that she had a fever congestion several days ago which have since improved  No difficulty or pain with swallowing, no shortness of breath  History provided by:  Patient   used: No    Dental Pain      Prior to Admission Medications   Prescriptions Last Dose Informant Patient Reported? Taking?    ALPRAZolam (XANAX) 0 25 mg tablet   No No   Sig: TAKE 1 TABLET BY MOUTH IN THE MORNING AS NEEDED FOR ANXIETY OR PANIC ATTACK   Blood Glucose Monitoring Suppl (OneTouch Verio) w/Device KIT  Self No No   Sig: Use to test blood sugar three times a day   DULoxetine (CYMBALTA) 60 mg delayed release capsule   No No   Sig: Take 1 capsule (60 mg total) by mouth 2 (two) times a day   Lancets (onetouch ultrasoft) lancets  Self No No   Sig: Test blood sugar three times a day   Rexulti 2 MG tablet   No No   Sig: TAKE ONE TABLET BY MOUTH EVERY DAY   albuterol (PROVENTIL HFA,VENTOLIN HFA) 90 mcg/act inhaler  Self Yes No   Sig: Inhale 1-2 puffs as needed   esomeprazole (NexIUM) 20 mg capsule   No No   Sig: Take 1 capsule (20 mg total) by mouth daily in the early morning   Patient taking differently: Take 20 mg by mouth daily at bedtime   glucose blood (OneTouch Verio) test strip  Self No No   Sig: Test blood sugar three times a day   norgestimate-ethinyl estradiol (ORTHO-CYCLEN) 0 25-35 MG-MCG per tablet  Self Yes No   Sig: Take 1 tablet by mouth daily   zolpidem (AMBIEN) 10 mg tablet   No No   Sig: TAKE ONE TABLET BY MOUTH EVERY DAY AT BEDTIME AS NEEDED FOR SLEEP      Facility-Administered Medications: None       Past Medical History:   Diagnosis Date   • Anxiety    • Asthma    • Depression    • Head injury    • High triglycerides     total triglyceride 187 repeat normal 70   • Psychiatric disorder        Past Surgical History:   Procedure Laterality Date   • KNEE ARTHROSCOPY Right 2015   • KNEE ARTHROSCOPY Left 2013   • KNEE ARTHROSCOPY Left 2017   • KNEE SURGERY     • UPPER GASTROINTESTINAL ENDOSCOPY         Family History   Problem Relation Age of Onset   • Anxiety disorder Mother    • Depression Mother    • Asthma Mother    • Crohn's disease Mother    • Bipolar disorder Mother    • Cervical cancer Mother    • Depression Maternal Aunt    • Anxiety disorder Maternal Aunt    • Anxiety disorder Maternal Uncle    • Depression Maternal Grandfather    • Hypertension Maternal Grandmother    • Alcohol abuse Paternal Grandfather    • Breast cancer Family      I have reviewed and agree with the history as documented  E-Cigarette/Vaping   • E-Cigarette Use Current Every Day User      E-Cigarette/Vaping Substances   • Nicotine Yes    • THC No    • CBD No    • Flavoring Yes    • Other No    • Unknown No      Social History     Tobacco Use   • Smoking status: Never Smoker   • Smokeless tobacco: Never Used   • Tobacco comment: vape daily   Vaping Use   • Vaping Use: Every day   • Substances: Nicotine, Flavoring   Substance Use Topics   • Alcohol use: Yes     Alcohol/week: 2 0 standard drinks     Types: 2 Shots of liquor per week     Comment: socially   • Drug use: Yes     Types: Marijuana       Review of Systems   HENT: Negative for trouble swallowing  Respiratory: Negative  Cardiovascular: Negative  Gastrointestinal: Negative  Neurological: Negative  Physical Exam  Physical Exam  Vitals and nursing note reviewed  Constitutional:       General: She is not in acute distress  HENT:      Head: Normocephalic and atraumatic        Comments: No facial swelling     Mouth/Throat:      Mouth: Mucous membranes are moist       Pharynx: Oropharynx is clear  Comments: Bilateral lower wisdom teeth removed  Small, superficial area of ulceration noted to bilateral areas, no erythema or swelling  No swelling under the tongue, no posterior oropharynx swelling or erythema  Cardiovascular:      Rate and Rhythm: Normal rate  Pulmonary:      Effort: Pulmonary effort is normal    Musculoskeletal:      Cervical back: Normal range of motion and neck supple  Lymphadenopathy:      Cervical: No cervical adenopathy  Skin:     General: Skin is warm and dry  Neurological:      General: No focal deficit present  Mental Status: She is alert and oriented to person, place, and time  Motor: No weakness  Gait: Gait normal          Vital Signs  ED Triage Vitals [10/12/22 2132]   Temperature Pulse Respirations Blood Pressure SpO2   98 9 °F (37 2 °C) 98 16 140/94 99 %      Temp src Heart Rate Source Patient Position - Orthostatic VS BP Location FiO2 (%)   -- Monitor Sitting Right arm --      Pain Score       8           Vitals:    10/12/22 2132   BP: 140/94   Pulse: 98   Patient Position - Orthostatic VS: Sitting         Visual Acuity      ED Medications  Medications - No data to display    Diagnostic Studies  Results Reviewed     None                 No orders to display              Procedures  Procedures         ED Course                                             MDM  Number of Diagnoses or Management Options  Mouth pain  Diagnosis management comments: 57-year-old female, presenting with pain in mouth  Differential diagnosis includes a dental infection, ulcers among other diagnosis  Patient looks well, vital signs unremarkable, no facial or oropharyngeal swelling  Has 2 small areas that appear to be superficial ulcerations to bilateral lower mouth where wisdom teeth previously removed  Discussed with patient this may be sores due to recent viral infection    Patient states that she has dentist follow-up with, instructed to follow up for further evaluation, return precautions given  Disposition  Final diagnoses:   Mouth pain     Time reflects when diagnosis was documented in both MDM as applicable and the Disposition within this note     Time User Action Codes Description Comment    10/12/2022 11:21 PM Mabel Osorio Add [K13 79] Mouth pain       ED Disposition     ED Disposition   Discharge    Condition   Stable    Date/Time   Wed Oct 12, 2022 11:20 PM    Comment   Ramy Willis discharge to home/self care  Follow-up Information    None         Patient's Medications   Discharge Prescriptions    No medications on file       No discharge procedures on file      PDMP Review       Value Time User    PDMP Reviewed  Yes 10/3/2022  8:38 AM Cheryl Au, PhD          ED Provider  Electronically Signed by           Britni Mcdaniel MD  10/12/22 9320

## 2022-10-17 ENCOUNTER — OFFICE VISIT (OUTPATIENT)
Dept: OBGYN CLINIC | Facility: CLINIC | Age: 22
End: 2022-10-17
Payer: COMMERCIAL

## 2022-10-17 VITALS
DIASTOLIC BLOOD PRESSURE: 80 MMHG | HEIGHT: 64 IN | WEIGHT: 261 LBS | BODY MASS INDEX: 44.56 KG/M2 | SYSTOLIC BLOOD PRESSURE: 120 MMHG

## 2022-10-17 DIAGNOSIS — N93.0 BLEEDING AFTER INTERCOURSE: ICD-10-CM

## 2022-10-17 DIAGNOSIS — N92.0 MENORRHAGIA WITH REGULAR CYCLE: ICD-10-CM

## 2022-10-17 DIAGNOSIS — Z11.3 SCREEN FOR STD (SEXUALLY TRANSMITTED DISEASE): ICD-10-CM

## 2022-10-17 DIAGNOSIS — N94.6 DYSMENORRHEA IN ADOLESCENT: Primary | ICD-10-CM

## 2022-10-17 PROCEDURE — 99202 OFFICE O/P NEW SF 15 MIN: CPT | Performed by: NURSE PRACTITIONER

## 2022-10-17 RX ORDER — NORGESTIMATE AND ETHINYL ESTRADIOL 0.25-0.035
KIT ORAL
Qty: 84 TABLET | Refills: 4 | Status: SHIPPED | OUTPATIENT
Start: 2022-10-17

## 2022-10-17 NOTE — PROGRESS NOTES
Assessment/Plan:    Screen for STD (sexually transmitted disease)  GC/Ct/Trich done  Rx for HIV, Hep B, Hep C, RPR given  Will call with results and appropriate follow up    Menorrhagia with regular cycle  Discussed options to help with menorrhagia and dysmenorrhea  Reviewed Teresita, Lysteda, hormonal contraceptives  Discussed continuing on current OCP, switching OCP or to another hormonal contraceptive  Pt would like ot stay on current OCP  Discussed trying continuous use contraceptive  Reviewed increased risk of breakthrough bleeding with continuous use contraceptive  If breakthrough bleeding is occurring recommend menses every 3 months  Rx for OCP sent to pharmacy  RTO annual exam or sooner as needed    Bleeding after intercourse  Rx for pelvic ultrasound given  If WNL and bleeding continues will recommend endometrial biopsy  Will call with ultrasound results and follow up appropriatly  RTO annual exam or sooner as needed       Diagnoses and all orders for this visit:    Dysmenorrhea in adolescent  -     norgestimate-ethinyl estradiol (ORTHO-CYCLEN) 0 25-35 MG-MCG per tablet; Take 1 tablet daily, skip placebo pills, continuous contraceptive  -     US pelvis complete w transvaginal; Future    Menorrhagia with regular cycle  -     norgestimate-ethinyl estradiol (ORTHO-CYCLEN) 0 25-35 MG-MCG per tablet; Take 1 tablet daily, skip placebo pills, continuous contraceptive  -     US pelvis complete w transvaginal; Future    Bleeding after intercourse  -     US pelvis complete w transvaginal; Future    Screen for STD (sexually transmitted disease)  -     Hepatitis B surface antigen; Future  -     Hepatitis C antibody  -     Human Immunodeficiency Virus 1/2 Antigen / Antibody ( Fourth Generation) with Reflex Testing  -     RPR  -     Hepatitis B surface antigen  -     Chlamydia/GC amplified DNA by PCR  -     Trichomonas Vaginalis, ALEJANDRO          Subjective:      Patient ID: Fab Kwok is a 23 y o  female      Pt presents as a new patient for a problem visit  She is recently sexually active x several months since July  3 sexual partners, male, did use condoms  Has not had STD testing  Her menses are a lot heavier and more painful  This started in July when she became sexually active  She was already on OCPs due to painful menses  She does have pelvic pain outside of her menses  Feels like a cramping pain that tends to be more on the left  Pain has intensified since starting in July  She currently ibuprofen with little to no relief  She denies any pain with intercourse  She has had bleeding with intercourse on occasion, not with every sexual interaction  Bleeding will last about an hour after intercourse and describes as a spotting  She did have an pelvic ultrasound 3/24/2021 which was WNL  She has been on her current OCP Ortho-cyclen for the last 4-5 years  Denies any side effects from OCP  She does sometimes have difficulty remembering to take her pill  Recently missed 4 days of OCP in a row, during week two of pill pack  She currently has heavy bleeding that started day 5 of missed pills and has been bleeding for 3 days  The following portions of the patient's history were reviewed and updated as appropriate: allergies, current medications, past family history, past medical history, past social history, past surgical history and problem list     Review of Systems   Genitourinary: Positive for menstrual problem, pelvic pain and vaginal bleeding  Objective:    /80 (BP Location: Right arm, Patient Position: Sitting, Cuff Size: Large)   Ht 5' 4" (1 626 m)   Wt 118 kg (261 lb)   LMP 09/29/2022 (Exact Date)   BMI 44 80 kg/m²        Physical Exam  Vitals and nursing note reviewed  Constitutional:       Appearance: She is well-developed  HENT:      Head: Normocephalic and atraumatic  Neck:      Thyroid: No thyromegaly     Cardiovascular:      Rate and Rhythm: Normal rate and regular rhythm  Heart sounds: Normal heart sounds  Pulmonary:      Effort: Pulmonary effort is normal       Breath sounds: Normal breath sounds  Abdominal:      General: Bowel sounds are normal  There is no distension  Palpations: Abdomen is soft  There is no mass  Tenderness: There is no abdominal tenderness  There is no guarding or rebound  Genitourinary:     Labia:         Right: No rash, tenderness, lesion or injury  Left: No rash, tenderness, lesion or injury  Vagina: Normal       Cervix: Normal       Uterus: Normal        Adnexa: Right adnexa normal and left adnexa normal         Right: No mass, tenderness or fullness  Left: No mass, tenderness or fullness  Musculoskeletal:         General: Normal range of motion  Cervical back: Normal range of motion and neck supple  Skin:     General: Skin is warm and dry  Neurological:      Mental Status: She is alert and oriented to person, place, and time  Psychiatric:         Behavior: Behavior normal          Thought Content:  Thought content normal          Judgment: Judgment normal

## 2022-10-18 PROBLEM — K13.79 MOUTH PAIN: Status: ACTIVE | Noted: 2022-10-18

## 2022-10-19 ENCOUNTER — TELEMEDICINE (OUTPATIENT)
Dept: BEHAVIORAL/MENTAL HEALTH CLINIC | Facility: CLINIC | Age: 22
End: 2022-10-19
Payer: COMMERCIAL

## 2022-10-19 DIAGNOSIS — F41.1 GENERALIZED ANXIETY DISORDER: ICD-10-CM

## 2022-10-19 DIAGNOSIS — F33.1 MODERATE EPISODE OF RECURRENT MAJOR DEPRESSIVE DISORDER (HCC): Primary | ICD-10-CM

## 2022-10-19 LAB
C TRACH RRNA SPEC QL NAA+PROBE: NEGATIVE
N GONORRHOEA RRNA SPEC QL NAA+PROBE: NEGATIVE
T VAGINALIS RRNA SPEC QL NAA+PROBE: NEGATIVE

## 2022-10-19 PROCEDURE — 90834 PSYTX W PT 45 MINUTES: CPT | Performed by: SOCIAL WORKER

## 2022-10-19 NOTE — PSYCH
This note was not shared with the patient due to this is a psychotherapy note    Virtual Regular Visit    Verification of patient location:    Patient is located in the following state in which I hold an active license [place]      Assessment/Plan:    Problem List Items Addressed This Visit        Other    Anxiety disorder    Moderate episode of recurrent major depressive disorder ([facility] [address] ) - Primary          Goals addressed in session: Goal 1          Reason for visit is   Chief Complaint   Patient presents with   • Virtual Regular Visit        Encounter provider Shirin Clark    Provider located at [address] [place], [place] [address]  [place] [address]  [phone]      Recent Visits  Date Type Provider Dept   10/12/22 [address] [place] [address] [facility]   Showing recent visits within past 7 days and meeting all other requirements  Today's Visits  Date Type Provider Dept   10/19/22 Telemedicine Shirin Clark [facility]   Showing today's visits and meeting all other requirements  Future Appointments  No visits were found meeting these conditions  Showing future appointments within next 150 days and meeting all other requirements       The patient was identified by name and date of birth  Sagar Diaz was informed that this is a telemedicine visit and that the visit is being conducted through[employer] and patient was informed that this is a secure, HIPAA-compliant platform  She agrees to proceed     My office door was closed  No one else was in the room  She acknowledged consent and understanding of privacy and security of the video platform  The patient has agreed to participate and understands they can discontinue the visit at any time  Patient is aware this is a billable service       Psychotherapy Provided: Individual Psychotherapy 44 minutes Length of time in session: 44 minutes, follow up in 1 week    Encounter Diagnosis     ICD-10-CM    1  Moderate episode of recurrent major depressive disorder (HCC)  F33 1    2  Generalized anxiety disorder  F41 1        Goals addressed in session: Goal 1     Data: Writer engaged Vinnykatey, for an individual psychotherapy session  Vinny reported that she is not feeling great and has recently developed a cough  Vinny reported that she went to [place] for the first time over the weekend with friends and enjoyed herself; however, became tired and irritable toward the end of the day  Vinny reported that in talking with a friend, she realized that she has been dissociating  Vinny reported that this would account for the trouble focusing  Vinny recalled times at school, work and in social settings during which her memory of the event is "blurry" because she dissociates  Writer spent time exploring this with Vinny and provided psycho-education on dissociation  Vinny identified that her experience is consistent with Writer's description  Writer and Vinny discussed dissociation as a coping and/or defense mechanism  Writer encouraged Vinny to be mindful of her emotions at times that she is aware that this happens to help increase self-awareness  Writealy and Vinny also discussed use of emotional grounding skills to help her remain present  Vinny reported that irritability in her mood has decreased and she believes that this was related to her missing doses of her birth control pill which caused an early period  Writer discussed the importance of taking medication consistently  Assessment: Vinny presented in a neutral mood with congruent affect  Vinny was engaged and oriented X3  Mood and level of engagement were improved from last session  Eye contact was good, speech was of normal rate and tone  Thought content was normal, insight and judgement were intact   SI/HI not reported or observed during session  Plan: Freda Delgadillo was advised to schedule an appointment with Dr Santa Paris  Continue with individual psychotherapy  Current suicide risk : Low     Behavioral Health Treatment Plan [facility]: Diagnosis and Treatment Plan explained to Clifton Dang relates understanding diagnosis and is agreeable to Treatment Plan   Yes      I spent 44 minutes directly with the patient during this visit     TIME IN: 9am  TIME OUT: 9:44am  TOTAL SESSION MINS: 44mins

## 2022-10-26 ENCOUNTER — TELEMEDICINE (OUTPATIENT)
Dept: BEHAVIORAL/MENTAL HEALTH CLINIC | Facility: CLINIC | Age: 22
End: 2022-10-26
Payer: COMMERCIAL

## 2022-10-26 DIAGNOSIS — F41.1 GENERALIZED ANXIETY DISORDER: ICD-10-CM

## 2022-10-26 DIAGNOSIS — F33.1 MODERATE EPISODE OF RECURRENT MAJOR DEPRESSIVE DISORDER (HCC): Primary | ICD-10-CM

## 2022-10-26 PROBLEM — N92.0 MENORRHAGIA WITH REGULAR CYCLE: Status: ACTIVE | Noted: 2022-10-26

## 2022-10-26 PROBLEM — N93.0 BLEEDING AFTER INTERCOURSE: Status: ACTIVE | Noted: 2022-10-26

## 2022-10-26 PROBLEM — Z11.3 SCREEN FOR STD (SEXUALLY TRANSMITTED DISEASE): Status: ACTIVE | Noted: 2022-10-26

## 2022-10-26 PROCEDURE — 90832 PSYTX W PT 30 MINUTES: CPT | Performed by: SOCIAL WORKER

## 2022-10-26 NOTE — ASSESSMENT & PLAN NOTE
Rx for pelvic ultrasound given     If WNL and bleeding continues will recommend endometrial biopsy  Will call with ultrasound results and follow up appropriatly  RTO annual exam or sooner as needed

## 2022-10-26 NOTE — PSYCH
This note was not shared with the patient due to this is a psychotherapy note    Virtual Regular Visit    Verification of patient location:    Patient is located in the following state in which I hold an active license [place]      Assessment/Plan:    Problem List Items Addressed This Visit        Other    Anxiety disorder    Moderate episode of recurrent major depressive disorder ([facility] [address] ) - Primary          Goals addressed in session: Goal 1          Reason for visit is   Chief Complaint   Patient presents with   • Virtual Regular Visit        Encounter provider Naif     Provider located at [address] [place] [address]  [address]  [place] [address]  [phone]      Recent Visits  Date Type Provider Dept   10/19/22 [address] [place] [address] [facility]   Showing recent visits within past 7 days and meeting all other requirements  Today's Visits  Date Type Provider Dept   10/26/22 Telemedicine [place]  [facility]   Showing today's visits and meeting all other requirements  Future Appointments  No visits were found meeting these conditions  Showing future appointments within next 150 days and meeting all other requirements       The patient was identified by name and date of birth  Lucita Hernandez was informed that this is a telemedicine visit and that the visit is being conducted throughthe [employer] platform  She agrees to proceed     My office door was closed  No one else was in the room  She acknowledged consent and understanding of privacy and security of the video platform  The patient has agreed to participate and understands they can discontinue the visit at any time  Patient is aware this is a billable service       Psychotherapy Provided: Individual Psychotherapy 21 minutes     Length of time in session: 21 minutes, follow up in 1 week    Encounter Diagnosis     ICD-10-CM    1  Moderate episode of recurrent major depressive disorder (HCC)  F33 1    2  Generalized anxiety disorder  F41 1        Goals addressed in session: Goal 1     Data: Writer engaged Vinny, virtually, for an individual psychotherapy session  Vinny reported that she tested positive for COVID yesterday  Vinny reported that her whole family is sick, aside from her father who just began feeling better  Vinny discussed some of her symptoms and concerns regarding shortness of breath and asthma  Vinny went on to report that prior to feeling sick, she was doing relatively well  Vinny noted some mild increases in anxiety and low mood; however, reported that she was able to successfully cope  Vinny reported that she has been taking her medication consistently  Vinny shared about a new person she has been spending time with over the past week  Myriam expressed excitement about the potential of this new relationship  Writer actively listened as Vinny shared and assisted with processing thoughts and feelings  Writer encouraged self-care  Assessment: Vinny presented in a neutral mood with congruent affect  Vinny was engaged and oriented X3  Eye contact was good, speech was of normal rate and tone  Thought content was normal; insight and judgement were intact  SI/HI not reported or observed during session  Plan: Continue with individual psychotherapy  Current suicide risk : Low     Behavioral Health Treatment Plan [facility]: Diagnosis and Treatment Plan explained to Radha Deborahfrancisco relates understanding diagnosis and is agreeable to Treatment Plan  Yes     Visit Time    Visit Start Time: 9:13am Vinny stated that she overslept which caused her to be late to her appointment     Visit Stop Time: 9:34am Session ended early as Vinny stated she was not feeling well (currently covid positive)  Total Visit Duration: 21 minutes

## 2022-10-26 NOTE — ASSESSMENT & PLAN NOTE
Discussed options to help with menorrhagia and dysmenorrhea  Reviewed Motrin, Lysteda, hormonal contraceptives  Discussed continuing on current OCP, switching OCP or to another hormonal contraceptive  Pt would like ot stay on current OCP  Discussed trying continuous use contraceptive  Reviewed increased risk of breakthrough bleeding with continuous use contraceptive  If breakthrough bleeding is occurring recommend menses every 3 months     Rx for OCP sent to pharmacy  RTO annual exam or sooner as needed

## 2022-10-26 NOTE — ASSESSMENT & PLAN NOTE
GC/Ct/Trich done  Rx for HIV, Hep B, Hep C, RPR given  Will call with results and appropriate follow up

## 2022-10-31 DIAGNOSIS — F51.05 INSOMNIA DUE TO OTHER MENTAL DISORDER: ICD-10-CM

## 2022-10-31 DIAGNOSIS — F99 INSOMNIA DUE TO OTHER MENTAL DISORDER: ICD-10-CM

## 2022-10-31 RX ORDER — ZOLPIDEM TARTRATE 10 MG/1
TABLET ORAL
Qty: 30 TABLET | Refills: 0 | Status: SHIPPED | OUTPATIENT
Start: 2022-10-31

## 2022-11-02 ENCOUNTER — TELEMEDICINE (OUTPATIENT)
Dept: BEHAVIORAL/MENTAL HEALTH CLINIC | Facility: CLINIC | Age: 22
End: 2022-11-02

## 2022-11-02 DIAGNOSIS — F41.1 GENERALIZED ANXIETY DISORDER: ICD-10-CM

## 2022-11-02 DIAGNOSIS — F33.1 MODERATE EPISODE OF RECURRENT MAJOR DEPRESSIVE DISORDER (HCC): Primary | ICD-10-CM

## 2022-11-02 NOTE — PSYCH
This note was not shared with the patient due to this is a psychotherapy note    Virtual Regular Visit    Verification of patient location:    Patient is located in the following state in which I hold an active license [place]      Assessment/Plan:    Problem List Items Addressed This Visit        Other    Anxiety disorder    Depression - Primary          Goals addressed in session: Goal 1          Reason for visit is   Chief Complaint   Patient presents with   • Virtual Regular Visit        Encounter provider Christina Osorio    Provider located at [address] [place] [address]  [address]  [place] [address]  [phone]      Recent Visits  Date Type Provider Dept   10/26/22 [address] [place] [address] [facility]   Showing recent visits within past 7 days and meeting all other requirements  Today's Visits  Date Type Provider Dept   11/02/22 Telemedicine Christina Osorio [facility]   Showing today's visits and meeting all other requirements  Future Appointments  No visits were found meeting these conditions  Showing future appointments within next 150 days and meeting all other requirements       The patient was identified by name and date of birth  Felicia Douglas was informed that this is a telemedicine visit and that the visit is being conducted throughthe [employer] platform  She agrees to proceed     My office door was closed  No one else was in the room  She acknowledged consent and understanding of privacy and security of the video platform  The patient has agreed to participate and understands they can discontinue the visit at any time  Patient is aware this is a billable service  Psychotherapy Provided: Individual Psychotherapy 30 minutes     Length of time in session: 30 minutes, follow up in 1 week    Encounter Diagnosis     ICD-10-CM    1  Moderate episode of recurrent major depressive disorder (HCC)  F33 1    2  Generalized anxiety disorder  F41 1        Goals addressed in session: Goal 1     Data: Writer engaged Bulmaro Hayes, virtually, for an individual psychotherapy session  Bulmaro Hayes reported that she is on day 8 of COVID and has had some improvement since last week, but still not feeling well  Marco Antoniomichael Hayes reported that she has not been to school or work  Bulmaro Hayes reported that she has fallen behind on school work and is now failing two classes  Writer spent time exploring this with Bulmaro Hayes and discussing how she can bring up her grades  Bulmaro Hayes reported some irritability, but overall stable mood  Jigneshgemma Hayes discussed some minor stressors  Bulmaro Hayes reported that it has been difficult to stay at home and she has been restless  Writer offered emotional validation to Bulmaro Hayes  Writer spent time processing Myriam's thoughts and feelings  Writer and Bulmaro Lunaon discussed continued self-care and healthy outlets  Assessment: Bulmaro Hayes presented in a mildly depressed mood with congruent affect  Jigneshgemma Hayes was engaged and oriented X3  Eye contact was good, speech was of normal rate and tone  Thought content was normal; insight and judgement were intact  Bulmaro Hayes appeared very congested and coughed often throughout session due to [place]  SI/HI  Not reported or observed during session  Plan: Continue with individual psychotherapy  Current suicide risk : Low     Behavioral Health Treatment Plan [facility]: Diagnosis and Treatment Plan explained to Colin Roach relates understanding diagnosis and is agreeable to Treatment Plan  Yes     Visit start and stop times:    11/02/22  Start Time: 0900  Stop Time: 0930  Total Visit Time: 30 minutes session ended early due to client not feeling well

## 2022-11-04 DIAGNOSIS — F33.1 MODERATE EPISODE OF RECURRENT MAJOR DEPRESSIVE DISORDER (HCC): ICD-10-CM

## 2022-11-04 DIAGNOSIS — K21.9 GASTROESOPHAGEAL REFLUX DISEASE, UNSPECIFIED WHETHER ESOPHAGITIS PRESENT: ICD-10-CM

## 2022-11-04 DIAGNOSIS — F41.1 GENERALIZED ANXIETY DISORDER: ICD-10-CM

## 2022-11-04 RX ORDER — DULOXETIN HYDROCHLORIDE 60 MG/1
CAPSULE, DELAYED RELEASE ORAL
Qty: 60 CAPSULE | Refills: 1 | Status: SHIPPED | OUTPATIENT
Start: 2022-11-04

## 2022-11-04 RX ORDER — ALPRAZOLAM 0.25 MG/1
TABLET ORAL
Qty: 30 TABLET | Refills: 0 | Status: SHIPPED | OUTPATIENT
Start: 2022-11-04

## 2022-11-07 ENCOUNTER — OFFICE VISIT (OUTPATIENT)
Dept: OTOLARYNGOLOGY | Facility: CLINIC | Age: 22
End: 2022-11-07

## 2022-11-07 VITALS — WEIGHT: 255 LBS | BODY MASS INDEX: 43.54 KG/M2 | TEMPERATURE: 97.5 F | HEIGHT: 64 IN

## 2022-11-07 DIAGNOSIS — J03.90 ACUTE TONSILLITIS, UNSPECIFIED ETIOLOGY: Primary | ICD-10-CM

## 2022-11-07 RX ORDER — AMOXICILLIN AND CLAVULANATE POTASSIUM 875; 125 MG/1; MG/1
1 TABLET, FILM COATED ORAL EVERY 12 HOURS SCHEDULED
Qty: 20 TABLET | Refills: 0 | Status: SHIPPED | OUTPATIENT
Start: 2022-11-07 | End: 2022-11-17

## 2022-11-07 RX ORDER — METHYLPREDNISOLONE 4 MG/1
TABLET ORAL
Qty: 1 TABLET | Refills: 0 | Status: SHIPPED | OUTPATIENT
Start: 2022-11-07

## 2022-11-07 NOTE — PROGRESS NOTES
[facility]  [facility]  [facility]  Otolaryngology -- Head and Neck Surgery New Patient Visit  Jaya Han is a 23 y o  who presents with a chief complaint of     Here for severe sore throat for 13 days  No history of pertosnillar abscess  She is a known case of GERD on medication      Review of systems: Pertinent review of systems documented in the HPI  10 point ROS documented in a separate note, as necessary  Results reviewed; images from any scan have been personally reviewed: The past medical, surgical, social and family history have been reviewed as documented in today's record    Past Medical History:   Diagnosis Date   • Anxiety    • Asthma    • Depression    • Head injury    • High triglycerides     total triglyceride 187 repeat normal 70   • Psychiatric disorder      Past Surgical History:   Procedure Laterality Date   • KNEE ARTHROSCOPY Right 2015   • KNEE ARTHROSCOPY Left 2013   • KNEE ARTHROSCOPY Left 2017   • KNEE SURGERY     • UPPER GASTROINTESTINAL ENDOSCOPY       Family History   Problem Relation Age of Onset   • Anxiety disorder Mother    • Depression Mother    • Asthma Mother    • Crohn's disease Mother    • Bipolar disorder Mother    • Cervical cancer Mother    • Depression Maternal Aunt    • Anxiety disorder Maternal Aunt    • Anxiety disorder Maternal Uncle    • Depression Maternal Grandfather    • Hypertension Maternal Grandmother    • Alcohol abuse Paternal Grandfather    • Breast cancer Family      Current Outpatient Medications on File Prior to Visit   Medication Sig Dispense Refill   • albuterol (PROVENTIL HFA,VENTOLIN HFA) 90 mcg/act inhaler Inhale 1-2 puffs as needed     • ALPRAZolam (XANAX) 0 25 mg tablet TAKE ONE TABLET BY MOUTH EVERY MORNING AS NEEDED FOR ANXIETY OR PANIC ATTACK 30 tablet 0   • DULoxetine (CYMBALTA) 60 mg delayed release capsule TAKE ONE CAPSULE BY MOUTH TWICE A DAY 60 capsule 1   • esomeprazole (NexIUM) 20 mg capsule TAKE ONE CAPSULE BY MOUTH EVERY DAY IN THE EARLY MORNING 30 capsule 9   • norgestimate-ethinyl estradiol (ORTHO-CYCLEN) 0 25-35 MG-MCG per tablet Take 1 tablet daily, skip placebo pills, continuous contraceptive 84 tablet 4   • Rexulti 2 MG tablet TAKE ONE TABLET BY MOUTH EVERY DAY 30 tablet 3   • zolpidem (AMBIEN) 10 mg tablet TAKE ONE TABLET BY MOUTH EVERY DAY AT BEDTIME AS NEEDED FOR SLEEP 30 tablet 0   • Blood Glucose Monitoring Suppl (OneTouch Verio) w/Device KIT Use to test blood sugar three times a day (Patient not taking: No sig reported) 1 kit 0   • glucose blood (OneTouch Verio) test strip Test blood sugar three times a day (Patient not taking: No sig reported) 300 each 3   • Lancets (onetouch ultrasoft) lancets Test blood sugar three times a day (Patient not taking: No sig reported) 300 each 3     No current facility-administered medications on file prior to visit  Physical exam:   Temp 97 5 °F (36 4 °C) (Temporal)   Ht 5' 4" (1 626 m)   Wt 116 kg (255 lb)   BMI 43 77 kg/m²   Head: Atraumatic, no visible scalp lesions, parotid and submandibular salivary glands non-tender to palpation and without masses bilaterally  Neck:  No visible or palpable cervical lesions or lymphadenopathy, thyroid gland is normal in size and symmetry and without masses, normal laryngeal elevation with swallowing  Ears:    Right ear :  Auricle normal in appearance, mastoid prominence non-tender, external auditory canal clear  Tympanic membranes intact  Left ear :  Auricle normal in appearance, mastoid prominence non-tender, external auditory canal clear   Tympanic membranes intact  Nose/Sinuses:  External appearance unremarkable, no maxillary or frontal sinus tenderness to palpation bilaterally  Anterior rhinoscopy reveals:   Oral Cavity:  Moist mucus membranes, gums and dentition unremarkable, no oral mucosal masses or lesions, floor of mouth soft, tongue mobile without masses or lesions     Oropharynx: Base of tongue soft and without masses, tonsils grade 2, erythema and multiple small ulcers  Eyes:  Extra-ocular movements intact, pupils equally round and reactive to light and accommodation, the lids and conjunctivae are normal in appearance  Constitutional:  Well developed, well nourished and groomed, in no acute distress  Cardiovascular:  Normal rate and rhythm, no palpable thrills, no jugulovenous distension observed  Respiratory:  Normal respiratory effort without evidence of retractions or use of accessory muscles  Neurologic:  Cranial nerves II-XII intact bilaterally  Abdomen: Soft and lax  Extremities: No bruises   Psychiatric:  Alert and oriented to time, place and person  Procedures    Assessment:   1  Acute tonsillitis, unspecified etiology  amoxicillin-clavulanate (AUGMENTIN) 875-125 mg per tablet    methylPREDNISolone 4 MG tablet therapy pack    al mag oxide-diphenhydramine-lidocaine viscous (MAGIC MOUTHWASH) 1:1:1 suspension     Orders  No orders of the defined types were placed in this encounter  Discussion/Plan:    Acute tonsillits  Medrol, Augmentin and magic mouth wash

## 2022-11-11 DIAGNOSIS — R45.86 MOOD SWINGS: ICD-10-CM

## 2022-11-11 RX ORDER — BREXPIPRAZOLE 2 MG/1
TABLET ORAL
Qty: 30 TABLET | Refills: 3 | Status: SHIPPED | OUTPATIENT
Start: 2022-11-11

## 2022-11-17 ENCOUNTER — TELEMEDICINE (OUTPATIENT)
Dept: PSYCHIATRY | Facility: CLINIC | Age: 22
End: 2022-11-17

## 2022-11-17 DIAGNOSIS — Z79.899 HIGH RISK MEDICATION USE: ICD-10-CM

## 2022-11-17 DIAGNOSIS — R53.83 OTHER FATIGUE: ICD-10-CM

## 2022-11-17 DIAGNOSIS — F33.1 MODERATE EPISODE OF RECURRENT MAJOR DEPRESSIVE DISORDER (HCC): ICD-10-CM

## 2022-11-17 DIAGNOSIS — M62.838 MUSCLE SPASMS OF BOTH LOWER EXTREMITIES: ICD-10-CM

## 2022-11-17 DIAGNOSIS — R53.83 FATIGUE, UNSPECIFIED TYPE: ICD-10-CM

## 2022-11-17 DIAGNOSIS — F41.1 GENERALIZED ANXIETY DISORDER: Primary | ICD-10-CM

## 2022-11-17 DIAGNOSIS — R45.86 MOOD SWINGS: ICD-10-CM

## 2022-11-17 DIAGNOSIS — R41.89 COGNITIVE CHANGE: ICD-10-CM

## 2022-11-17 NOTE — BH TREATMENT PLAN
TREATMENT PLAN (Medication Management Only)         [facility]     Name and Date of Andrea Borrero  2000  Date of Treatment Plan: July 6, 2020, updated 10/5/2020, 5/10/2021, 11/9/2021, 5/23/2022, 11/17/2022  Diagnosis/Diagnoses:    1  Persistent mood (affective) disorder, unspecified ([address] )    2  Anxiety disorder, unspecified type       Strengths/Personal Resources for Self-Care: supportive family, supportive friends, taking medications as prescribed  Area/Areas of need (in own words): anxiety, depression  1          Long Term Goal: maintain mood stability  Target Date: 6 months - 5/17/2023  Person/Persons responsible for completion of goal: Myriam  2          Short Term Objective (s) - How will we reach this goal?:   A  Provider new recommended medication/dosage changes and/or continue medication(s): continue current medications as prescribed  B  stable moods  C  take medication as prescribed, therapy  Target Date: 6 months - 05/17/2023  Person/Persons Responsible for Completion of Goal: Myriam  Progress Towards Goals: continuing treatment  Treatment Modality: medication management every 3 months  Review due 180 days from date of this plan: 6 months - 05/17/2023  Expected length of service: ongoing treatment  My Physician/PA/NP and I have developed this plan together and I agree to work on the goals and objectives   I understand the treatment goals that were developed for my treatment

## 2022-11-28 NOTE — PSYCH
Virtual Regular Visit    Problem List Items Addressed This Visit        Other    Anxiety disorder - Primary    Relevant Orders    TSH Stimulation    TSH, 3rd generation with Free T4 reflex    Moderate episode of recurrent major depressive disorder (HCC)    Relevant Orders    TSH Stimulation    TSH, 3rd generation with Free T4 reflex    Mood swings   Other Visit Diagnoses     Cognitive change        Relevant Orders    CBC and differential    Comprehensive metabolic panel    HEMOGLOBIN A1C W/ EAG ESTIMATION    TSH Stimulation    TSH, 3rd generation with Free T4 reflex    C-reactive protein    Vitamin B12    Muscle spasms of both lower extremities        Relevant Orders    Magnesium    High risk medication use        Relevant Orders    Lipid Panel with Direct LDL reflex    Vitamin D 25 hydroxy    Fatigue, unspecified type        Relevant Orders    Iron Panel (Includes Ferritin, Iron Sat%, Iron, and TIBC)        Reason for visit is   Chief Complaint   Patient presents with   • Mood Swings   • Depression   • Anxiety   • Medication Management     Encounter provider Brunilda Obregon, PhD    Provider located at [address] [place] [address]  [address]  [place] [address]  [phone]    Recent Visits  No visits were found meeting these conditions  Showing recent visits within past 7 days and meeting all other requirements  Future Appointments  No visits were found meeting these conditions  Showing future appointments within next 150 days and meeting all other requirements       After connecting through [employer], the patient was identified by name and date of birth  Susan Charles was informed that this is a telemedicine visit and that the visit is being conducted through the [facility] platform  She agrees to proceed  which may not be secure and therefore, might not be HIPAA-compliant  My office door was closed  No one else was in the room    She acknowledged consent and understanding of privacy and security of the video platform  The patient has agreed to participate and understands they can discontinue the visit at any time  SUBJECTIVE:    Cale Mireles is a 23 y o  female with a history of mood swings, depression, anxiety seen for medication management and mood assessment  Roney reports last two weeks having muscle spasms, hip, neck, and back  Recently had CoVid, virus and tonsillitis  Migraine this month lasted 3 days and then stopped  She is working at [employer], crowds and noise cause anxiety  She reports recently trouble with memory, focus, concentration issues  Anxiety and depression are reported as "ok"  She is forgetful and has a reduced appetite, she smokes every night and sleeps with help of Ambien  Takes medication as prescribed  Family are supportive      HPI ROS Appetite Changes and Sleep: decreased appetite and decreased energy    Review Of Systems:     Mood Anxiety and Depression   Behavior Normal    Thought Content Normal   General Emotional Problems and Sleep Disturbances   Personality Normal   Other Psych Symptoms Normal   Constitutional As Noted in HPI   ENT As Noted in HPI   Cardiovascular As Noted in HPI   Respiratory As Noted in HPI   Gastrointestinal As Noted in HPI   Genitourinary As Noted in HPI   Musculoskeletal As Noted in HPI   Integumentary As Noted in HPI   Neurological As Noted in HPI   Endocrine DM, abnormal TSH   Other Symptoms Normal        Substance Abuse History:    Social History     Substance and Sexual Activity   Drug Use Yes   • Types: Marijuana       Family Psychiatric History:     Family History   Problem Relation Age of Onset   • Anxiety disorder Mother    • Depression Mother    • Asthma Mother    • Crohn's disease Mother    • Bipolar disorder Mother    • Cervical cancer Mother    • Depression Maternal Aunt    • Anxiety disorder Maternal Aunt    • Anxiety disorder Maternal Uncle    • Depression Maternal Grandfather    • Hypertension Maternal Grandmother    • Alcohol abuse Paternal Grandfather    • Breast cancer Family        Social History     Socioeconomic History   • Marital status: Single     Spouse name: Not on file   • Number of children: 0   • Years of education: Not on file   • Highest education level: Some college, no degree   Occupational History   • Not on file   Tobacco Use   • Smoking status: Never   • Smokeless tobacco: Never   • Tobacco comments:     vape daily   Vaping Use   • Vaping Use: Every day   • Substances: Nicotine, Flavoring   Substance and Sexual Activity   • Alcohol use:  Yes     Alcohol/week: 2 0 standard drinks     Types: 2 Shots of liquor per week     Comment: socially   • Drug use: Yes     Types: Marijuana   • Sexual activity: Yes     Partners: Male   Other Topics Concern   • Not on file   Social History Narrative    12th grade     Social Determinants of Health     Financial Resource Strain: Not on file   Food Insecurity: Not on file   Transportation Needs: Not on file   Physical Activity: Not on file   Stress: Not on file   Social Connections: Not on file   Intimate Partner Violence: Not on file   Housing Stability: Not on file       Past Medical History:   Diagnosis Date   • Anxiety    • Asthma    • Depression    • Head injury    • High triglycerides     total triglyceride 187 repeat normal 70   • Psychiatric disorder        Past Surgical History:   Procedure Laterality Date   • KNEE ARTHROSCOPY Right 2015   • KNEE ARTHROSCOPY Left 2013   • KNEE ARTHROSCOPY Left 2017   • KNEE SURGERY     • UPPER GASTROINTESTINAL ENDOSCOPY         Current Outpatient Medications   Medication Sig Dispense Refill   • al mag oxide-diphenhydramine-lidocaine viscous (MAGIC MOUTHWASH) 1:1:1 suspension Swish and spit 10 mL every 4 (four) hours as needed for mouth pain or discomfort 200 mL 0   • albuterol (PROVENTIL HFA,VENTOLIN HFA) 90 mcg/act inhaler Inhale 1-2 puffs as needed     • ALPRAZolam (XANAX) 0 25 mg tablet TAKE ONE TABLET BY MOUTH EVERY MORNING AS NEEDED FOR ANXIETY OR PANIC ATTACK 30 tablet 0   • Blood Glucose Monitoring Suppl (OneTouch Verio) w/Device KIT Use to test blood sugar three times a day (Patient not taking: No sig reported) 1 kit 0   • DULoxetine (CYMBALTA) 60 mg delayed release capsule TAKE ONE CAPSULE BY MOUTH TWICE A DAY 60 capsule 1   • esomeprazole (NexIUM) 20 mg capsule TAKE ONE CAPSULE BY MOUTH EVERY DAY IN THE EARLY MORNING 30 capsule 9   • glucose blood (OneTouch Verio) test strip Test blood sugar three times a day (Patient not taking: No sig reported) 300 each 3   • Lancets (onetouch ultrasoft) lancets Test blood sugar three times a day (Patient not taking: No sig reported) 300 each 3   • methylPREDNISolone 4 MG tablet therapy pack Use as directed on package 1 tablet 0   • norgestimate-ethinyl estradiol (ORTHO-CYCLEN) 0 25-35 MG-MCG per tablet Take 1 tablet daily, skip placebo pills, continuous contraceptive 84 tablet 4   • Rexulti 2 MG tablet TAKE ONE TABLET BY MOUTH EVERY DAY 30 tablet 3   • zolpidem (AMBIEN) 10 mg tablet TAKE ONE TABLET BY MOUTH EVERY DAY AT BEDTIME AS NEEDED FOR SLEEP 30 tablet 0     No current facility-administered medications for this visit          No Known Allergies    The following portions of the patient's history were reviewed and updated as appropriate: allergies, current medications, past family history, past medical history, past social history, past surgical history and problem list     OBJECTIVE:     Mental Status Examination:    Appearance calm and cooperative , adequate hygiene and grooming and good eye contact    Mood anxious   Affect affect appropriate    Speech a normal rate   Thought Processes normal thought processes   Hallucinations no hallucinations present    Thought Content no delusions   Abnormal Thoughts no suicidal thoughts  and no homicidal thoughts    Orientation  oriented to person and place and time   Remote Memory short term memory impaired and long term memory impaired   Attention Span concentration impaired as reported   Intellect Appears to be of Average Intelligence   Insight Insight intact   Judgement judgment was intact   Muscle Strength Muscle strength and tone were normal   Language no difficulty naming common objects   Fund of Knowledge displays adequate knowledge of current events   Pain none   Pain Scale 0       Laboratory Results: No results found for this or any previous visit  Assessment/Plan:       Diagnoses and all orders for this visit:    Generalized anxiety disorder  -     TSH Stimulation; Future  -     TSH, 3rd generation with Free T4 reflex; Future  -     TSH, 3rd generation with Free T4 reflex    Moderate episode of recurrent major depressive disorder (HCC)  -     TSH Stimulation; Future  -     TSH, 3rd generation with Free T4 reflex; Future  -     TSH, 3rd generation with Free T4 reflex    Cognitive change  -     CBC and differential; Future  -     Comprehensive metabolic panel; Future  -     HEMOGLOBIN A1C W/ EAG ESTIMATION; Future  -     TSH Stimulation; Future  -     TSH, 3rd generation with Free T4 reflex; Future  -     C-reactive protein; Future  -     Vitamin B12; Future  -     CBC and differential  -     Comprehensive metabolic panel  -     HEMOGLOBIN A1C W/ EAG ESTIMATION  -     TSH, 3rd generation with Free T4 reflex  -     C-reactive protein  -     Vitamin B12    Muscle spasms of both lower extremities  -     Magnesium; Future  -     Magnesium    High risk medication use  -     Lipid Panel with Direct LDL reflex; Future  -     Vitamin D 25 hydroxy; Future  -     Lipid Panel with Direct LDL reflex  -     Vitamin D 25 hydroxy    Fatigue, unspecified type  -     Iron Panel (Includes Ferritin, Iron Sat%, Iron, and TIBC);  Future    Mood swings          Treatment Recommendations- Risks Benefits      Immediate Medical/Psychiatric/Psychotherapy Treatments and Any Precautions:  reviewed medication continue with treatment plan    Risks, Benefits And Possible Side Effects Of Medications:  {PSYCH RISK, BENEFITS AND POSSIBLE SIDE EFFECTS (Optional):55016    Controlled Medication Discussion: she is aware of safe use and storage of medication     Psychotherapy Provided: 30 min  Supportive therapy  Medication evaluation  Mood assessment  Try OTC magnesium for spasms call PCP  Lab work ordered    Goals discussed in session: improve mood, memory stability       Treatment Plan:    Completed and signed during the session: Yes - Treatment Plan done but not signed at time of office visit due to:  Plan reviewed by video and verbal consent given due to [address] distancing enacted July 6, 2020, updated 10/5/2020, 5/10/2021, 11/9/2021, 5/23/2022, 11/17/2022      Keisha Marino, PhD 11/28/22

## 2022-11-30 ENCOUNTER — TELEMEDICINE (OUTPATIENT)
Dept: BEHAVIORAL/MENTAL HEALTH CLINIC | Facility: CLINIC | Age: 22
End: 2022-11-30

## 2022-11-30 DIAGNOSIS — F33.1 MODERATE EPISODE OF RECURRENT MAJOR DEPRESSIVE DISORDER (HCC): Primary | ICD-10-CM

## 2022-11-30 DIAGNOSIS — F41.1 GENERALIZED ANXIETY DISORDER: ICD-10-CM

## 2022-11-30 NOTE — PSYCH
This note was not shared with the patient due to this is a psychotherapy note    Virtual Regular Visit    Verification of patient location:    Patient is located in the following state in which I hold an active license [place]      Assessment/Plan:    Problem List Items Addressed This Visit        Other    Anxiety disorder    Moderate episode of recurrent major depressive disorder ([facility] [address] ) - Primary       Goals addressed in session: Goal 1          Reason for visit is   Chief Complaint   Patient presents with   • Virtual Regular Visit        Encounter provider Asher Maguire    Provider located at [address]  [place] [address]  [phone]      Recent Visits  No visits were found meeting these conditions  Showing recent visits within past 7 days and meeting all other requirements  Today's Visits  Date Type Provider Dept   11/30/22 Telemedicine Asher Maguire [facility] [place]   Showing today's visits and meeting all other requirements  Future Appointments  No visits were found meeting these conditions  Showing future appointments within next 150 days and meeting all other requirements       The patient was identified by name and date of birth  Clint Cooper was informed that this is a telemedicine visit and that the visit is being conducted throughthe [employer] platform  She agrees to proceed     My office door was closed  No one else was in the room  She acknowledged consent and understanding of privacy and security of the video platform  The patient has agreed to participate and understands they can discontinue the visit at any time  Patient is aware this is a billable service  Psychotherapy Provided: Individual Psychotherapy 41 minutes     Length of time in session: 41 minutes, follow up in 1 week    Encounter Diagnosis     ICD-10-CM    1   Moderate episode of recurrent major depressive disorder ([facility] [address] )  F33 1       2  Generalized anxiety disorder  F41 1           Goals addressed in session: Goal 1     Data: Writer engaged Christy Mcgee, virtually, for an individual psychotherapy session  Christy Mcgee discussed how she spent Thanksgiving, and reported that she was sick for a good portion of November  Christy Mcgee reported feeling better now  Christy Mcgee reported that she has been feeling unmotivated  Pascualshima Mcgee denied depressed mood  Christy Mcgee reported that she is struggling with her school work and has been neglecting her self-care  Christy Mcgee reported that she could not think of any specific cause, but wondered if stress could cause this  Writer spent time exploring this with Christy Mcgee and identified that feeling stressed and overwhelmed can led to avoidant behavior  Writer and Christy Mcgee discussed taking control of her life and responsibilities  Writer asked Christy Mcgee to create a daily schedule for herself to help her get back on tract  Writer and Pascualia Everardo discussed the details of how she can best do this  Writer also encouraged Christy Mcgee to communicate with her support system about her challenges with motivation and ask for support as she makes changes; Christy Mcgee agreed to do this  Writer also addressed Myriam's attendance to therapy, informing her that her recurring appointments are currently removed due to 3 no shows  Writer informed Christy Mcgee that she will have to schedule week to week until consistency is establish, Christy Chaveznghia expressed understanding  Assessment: Christy Mcgee presented in a depressed mood with congruent affect  Christy Mcgee was engaged and oriented X3  Eye contact was fair, speech was of normal rate and tone  Thought content was normal, insight and judgement were fair  Writer observed lack of motivation and ambivalence throughout Myriam's verbalizations when discussing making positive change  SI/HI not reported or observed during session      Plan: Continue with individual psychotherapy  Current suicide risk : Low     Behavioral Health Treatment Plan [facility]: Diagnosis and Treatment Plan explained to Jose Vitale relates understanding diagnosis and is agreeable to Treatment Plan   Yes     Visit start and stop times:    11/30/22  Start Time: 0903  Stop Time: 1243  Total Visit Time: 41 minutes

## 2022-12-05 DIAGNOSIS — F41.1 GENERALIZED ANXIETY DISORDER: ICD-10-CM

## 2022-12-05 RX ORDER — ALPRAZOLAM 0.25 MG/1
TABLET ORAL
Qty: 30 TABLET | Refills: 0 | Status: SHIPPED | OUTPATIENT
Start: 2022-12-05

## 2022-12-07 ENCOUNTER — TELEPHONE (OUTPATIENT)
Dept: PSYCHIATRY | Facility: CLINIC | Age: 22
End: 2022-12-07

## 2022-12-07 ENCOUNTER — TELEMEDICINE (OUTPATIENT)
Dept: BEHAVIORAL/MENTAL HEALTH CLINIC | Facility: CLINIC | Age: 22
End: 2022-12-07

## 2022-12-07 DIAGNOSIS — F33.1 MODERATE EPISODE OF RECURRENT MAJOR DEPRESSIVE DISORDER (HCC): Primary | ICD-10-CM

## 2022-12-07 DIAGNOSIS — F41.1 GENERALIZED ANXIETY DISORDER: ICD-10-CM

## 2022-12-07 NOTE — TELEPHONE ENCOUNTER
Writer contacted Yadira mother as discussed during session earlier today  Writer expressed concern regarding Juliettes reported increased depression symptoms  Writer informed Juliettes mother that Writer shared these concerns with Dr Praveen Pearson  Writer also discussed the potential of an IOP for Myriam Segovias mother was agreeable to monitor Letty Esquivel and to take her to the ER for screening in the event that SI becomes active  Juliettes mother reported that she also has the phone number for [facility]

## 2022-12-07 NOTE — PSYCH
This note was not shared with the patient due to this is a psychotherapy note    Virtual Regular Visit    Verification of patient location:    Patient is located in the following state in which I hold an active license [place]      Assessment/Plan:    Problem List Items Addressed This Visit        Other    Anxiety disorder    Moderate episode of recurrent major depressive disorder ([facility] [address] ) - Primary       Goals addressed in session: Goal 1          Reason for visit is   Chief Complaint   Patient presents with   • Virtual Regular Visit        Encounter provider Cullen Mejía    Provider located at [address] [place] [address]  [address]  [place] [address]  [phone]      Recent Visits  Date Type Provider Dept   11/30/22 [address] [place] [address] [facility] [place]   Showing recent visits within past 7 days and meeting all other requirements  Today's Visits  Date Type Provider Dept   12/07/22 [address] [place] [address] [facility] [place]   Showing today's visits and meeting all other requirements  Future Appointments  No visits were found meeting these conditions  Showing future appointments within next 150 days and meeting all other requirements       The patient was identified by name and date of birth  Kirsten Campos was informed that this is a telemedicine visit and that the visit is being conducted throughthe [employer] platform  She agrees to proceed     My office door was closed  No one else was in the room  She acknowledged consent and understanding of privacy and security of the video platform  The patient has agreed to participate and understands they can discontinue the visit at any time  Patient is aware this is a billable service       Psychotherapy Provided: Individual Psychotherapy 44 minutes     Length of time in session: 44 minutes, follow up in 1 week    Encounter Diagnosis     ICD-10-CM    1  Moderate episode of recurrent major depressive disorder (HCC)  F33 1       2  Generalized anxiety disorder  F41 1           Goals addressed in session: Goal 1     Data: Writer met with katey Hurt, for an individual psychotherapy session  Myriam informed Writer that she has been feeling depressed  John Tunde reported that she has experienced thoughts of self-harming and suicidal ideation  Alvaro Griffiths reported that she has not acted on either  Alvaro Griffiths reported that she does not have a plan for suicide and denied intent  Alvaro Griffiths reported that she does not believe she would act on suicidal thoughts because of how it would hurt her family  John Tunde reported that she has been having difficulty going to work and getting school work completed  Myriam expressed concern about failing classes  Alvaro Griffiths reported that she has not communicated to her parents about how she is feeling as she is worried they will pass unhelpful judgement  Alvaro Griffiths reported that she has been isolating  Johnisa Saabes reported taking medication as prescribed  Writer spent time exploring Myriam's thoughts and feelings  Writer offered emotional validation  Writer discussed safety and use of resources   and Alvaro Griffiths discussed navigating the remainder of her semester at school  Writer agreed to contact Myriam's mother to assist with communicating concerns  Writer provided Alvaro Griffiths with the phone number for [facility] and informed John Tunde that should her suicidal thoughts increase, to go to the emergency room for screening; Alvaro Griffiths expressed understanding  Writer encouraged healthy coping  Assessment: Alvaro Griffiths presented in a depressed mood with congruent affect  Alvaro Griffiths was engaged and oriented X3  Eye contact was good, speech was of normal rate and tone  Thought content was normal, insight and judgement were intact  SI noted above, HI not observed or reported       Plan: Contact Myriam's mother with concerns  Inform Dr Merrick Fenton of decline  Continue with individual psychotherapy  Current suicide risk : Low     Behavioral Health Treatment Plan [facility]: Diagnosis and Treatment Plan explained to Maryuri Memory relates understanding diagnosis and is agreeable to Treatment Plan   Yes     Visit start and stop times:    12/07/22  Start Time: 0900  Stop Time: 0944  Total Visit Time: 44 minutes

## 2022-12-14 ENCOUNTER — TELEMEDICINE (OUTPATIENT)
Dept: BEHAVIORAL/MENTAL HEALTH CLINIC | Facility: CLINIC | Age: 22
End: 2022-12-14

## 2022-12-14 DIAGNOSIS — F41.1 GENERALIZED ANXIETY DISORDER: ICD-10-CM

## 2022-12-14 DIAGNOSIS — F33.1 MODERATE EPISODE OF RECURRENT MAJOR DEPRESSIVE DISORDER (HCC): Primary | ICD-10-CM

## 2022-12-14 NOTE — PSYCH
This note was not shared with the patient due to this is a psychotherapy note    Virtual Regular Visit    Verification of patient location:    Patient is located in the following state in which I hold an active license [place]      Assessment/Plan:    Problem List Items Addressed This Visit        Other    Anxiety disorder    Moderate episode of recurrent major depressive disorder ([facility] [address] ) - Primary       Goals addressed in session: Goal 1          Reason for visit is   Chief Complaint   Patient presents with   • Virtual Regular Visit        Encounter provider Jo Street    Provider located at [address] [place] [address]  [address]  [place] [address]  [phone]      Recent Visits  Date Type Provider Dept   12/07/22 Telephone Jo Street [facility]   12/07/22 2124 [address] [place] [address] [facility] [place]   Showing recent visits within past 7 days and meeting all other requirements  Today's Visits  Date Type Provider Dept   12/14/22 Telemedicine Jo Street [facility] [place]   Showing today's visits and meeting all other requirements  Future Appointments  No visits were found meeting these conditions  Showing future appointments within next 150 days and meeting all other requirements       The patient was identified by name and date of birth  [facility] was informed that this is a telemedicine visit and that the visit is being conducted throughthe [employer] platform  She agrees to proceed     My office door was closed  No one else was in the room  She acknowledged consent and understanding of privacy and security of the video platform  The patient has agreed to participate and understands they can discontinue the visit at any time  Patient is aware this is a billable service       Psychotherapy Provided: Individual Psychotherapy 45 minutes     Length of time in session: 45 minutes, follow up in 1 week    Encounter Diagnosis     ICD-10-CM    1  Moderate episode of recurrent major depressive disorder (HCC)  F33 1       2  Generalized anxiety disorder  F41 1           Goals addressed in session: Goal 1     Data: Writer engaged Chelsy Elizalde virtually, for an individual psychotherapy session  Graysonvanessa Fide reported that she continues to feel significantly depressed, but has not had thoughts of self-harm/suicide for the past few days  Chelsy Elizalde reported that she did not return call to Dr Saira Coppola as advised [facility] reported that has been feeling emptiness in her mind, emotionally numb and isolating  Chelsy Elizalde reported that she has not had motivation to do much of anything, but did manage to get to work when she was scheduled over the past week  Chelsy Elizalde reported feeling overwhelmed with school, and did not complete any school work last week  Writer actively listened as Graysonvanessa Fide shared and expressed concern regarding her report  Writer discussed the possible need for a higher level of care such as IOP  Writer provided Chelsy Elizalde with possible IOP options and advised her to call  Writer then [employer] with organizing her thoughts about school responsibilities   and Chelsy Elizalde created a list and then prioritized and specified what needs to be done and when  Chelsy Elizalde agreed to utilize this list to help her get through the last two weeks of this semester  Assessment: Chelsy Elizalde presented in a depressed mood with congruent affect  Chelsy Elizalde was engaged and oriented X3  Eye contact was good, speech was of normal rate and tone  Thoughts were very depressed and unmotivated  Insight and judgement were intact  Chelsy Elizalde was agreeable to intervention by Writer  SI/HI denied during session  Plan: Continue with individual psychotherapy      Current suicide risk : Low   Behavioral Health Treatment Plan [facility]: Diagnosis and Treatment Plan explained to Amanda Ayers relates understanding diagnosis and is agreeable to Treatment Plan   Yes     Visit start and stop times:    12/14/22  Start Time: 0905  Stop Time: 0950  Total Visit Time: 45 minutes

## 2022-12-15 ENCOUNTER — TELEMEDICINE (OUTPATIENT)
Dept: PSYCHIATRY | Facility: CLINIC | Age: 22
End: 2022-12-15

## 2022-12-15 DIAGNOSIS — R53.83 OTHER FATIGUE: ICD-10-CM

## 2022-12-15 DIAGNOSIS — F41.1 GENERALIZED ANXIETY DISORDER: ICD-10-CM

## 2022-12-15 DIAGNOSIS — F33.1 MODERATE EPISODE OF RECURRENT MAJOR DEPRESSIVE DISORDER (HCC): ICD-10-CM

## 2022-12-15 DIAGNOSIS — R45.86 MOOD SWINGS: ICD-10-CM

## 2022-12-15 DIAGNOSIS — F39 MOOD DISORDER (HCC): Primary | ICD-10-CM

## 2022-12-15 RX ORDER — LAMOTRIGINE 25 MG/1
TABLET ORAL
Qty: 42 TABLET | Refills: 0 | Status: SHIPPED | OUTPATIENT
Start: 2022-12-15

## 2022-12-21 ENCOUNTER — TELEPHONE (OUTPATIENT)
Dept: PSYCHIATRY | Facility: CLINIC | Age: 22
End: 2022-12-21

## 2022-12-25 PROBLEM — Z11.3 SCREEN FOR STD (SEXUALLY TRANSMITTED DISEASE): Status: RESOLVED | Noted: 2022-10-26 | Resolved: 2022-12-25

## 2022-12-26 PROBLEM — F39 MOOD DISORDER (HCC): Status: ACTIVE | Noted: 2022-12-26

## 2022-12-26 NOTE — PSYCH
Virtual Regular Visit    Problem List Items Addressed This Visit        Other    Anxiety disorder    Moderate episode of recurrent major depressive disorder (HCC)    Mood disorder ([facility] [address] ) - Primary    Relevant Medications    lamoTRIgine (LaMICtal) 25 mg tablet    Other fatigue     Reason for visit is   Chief Complaint   Patient presents with   • Anxiety   • Depression   • Medication Management     Encounter provider Faviola Vargas, PhD    Provider located at [address] [place] [address]  [address]  [place] [address]  [phone]    Recent Visits  Date Type Provider Dept   12/21/22 Telephone Becka Gilman [facility] [place]   Showing recent visits within past 7 days and meeting all other requirements  Future Appointments  No visits were found meeting these conditions  Showing future appointments within next 150 days and meeting all other requirements       After connecting through [employer], the patient was identified by name and date of birth  Rock Garrido was informed that this is a telemedicine visit and that the visit is being conducted through the [address] [place] [address] Road [employer] platform  She agrees to proceed  which may not be secure and therefore, might not be HIPAA-compliant  My office door was closed  No one else was in the room  She acknowledged consent and understanding of privacy and security of the video platform  The patient has agreed to participate and understands they can discontinue the visit at any time  SUBJECTIVE:    Rock Garrido is a 23 y o  female with a history of LUZMARIA, depression seen for medication management and mood assessment  Mick Ryan reports she is struggling with feeling "numb and angry " discussed IOP and she reports it is a "bad time to go to IOP " She reports her appetite is decreased and sleep is good  Denies SI or intent  Takes medication as prescribed  Attends therapy      HPI ROS Appetite Changes and Sleep: decreased appetite and normal energy level    Review Of Systems:     Mood Anxiety, Depression and Emotional Lability   Behavior Normal    Thought Content Disturbing Thoughts, Feelings low self esteem   General Emotional Problems   Personality Normal   Other Psych Symptoms Normal   Constitutional As Noted in HPI   ENT As Noted in HPI   Cardiovascular As Noted in HPI   Respiratory As Noted in HPI   Gastrointestinal As Noted in HPI   Genitourinary As Noted in HPI   Musculoskeletal As Noted in HPI   Integumentary As Noted in HPI   Neurological As Noted in HPI   Endocrine DM   Other Symptoms Normal        Substance Abuse History:    Social History     Substance and Sexual Activity   Drug Use Yes   • Types: Marijuana       Family Psychiatric History:     Family History   Problem Relation Age of Onset   • Anxiety disorder Mother    • Depression Mother    • Asthma Mother    • Crohn's disease Mother    • Bipolar disorder Mother    • Cervical cancer Mother    • Depression Maternal Aunt    • Anxiety disorder Maternal Aunt    • Anxiety disorder Maternal Uncle    • Depression Maternal Grandfather    • Hypertension Maternal Grandmother    • Alcohol abuse Paternal Grandfather    • Breast cancer Family        Social History     Socioeconomic History   • Marital status: Single     Spouse name: Not on file   • Number of children: 0   • Years of education: Not on file   • Highest education level: Some college, no degree   Occupational History   • Not on file   Tobacco Use   • Smoking status: Never   • Smokeless tobacco: Never   • Tobacco comments:     vape daily   Vaping Use   • Vaping Use: Every day   • Substances: Nicotine, Flavoring   Substance and Sexual Activity   • Alcohol use:  Yes     Alcohol/week: 2 0 standard drinks     Types: 2 Shots of liquor per week     Comment: socially   • Drug use: Yes     Types: Marijuana   • Sexual activity: Yes     Partners: Male   Other Topics Concern   • Not on file   Social History Narrative    12th grade     Social Determinants of Health     Financial Resource Strain: Not on file   Food Insecurity: Not on file   Transportation Needs: Not on file   Physical Activity: Not on file   Stress: Not on file   Social Connections: Not on file   Intimate Partner Violence: Not on file   Housing Stability: Not on file       Past Medical History:   Diagnosis Date   • Anxiety    • Asthma    • Depression    • Head injury    • High triglycerides     total triglyceride 187 repeat normal 70   • Psychiatric disorder        Past Surgical History:   Procedure Laterality Date   • KNEE ARTHROSCOPY Right 2015   • KNEE ARTHROSCOPY Left 2013   • KNEE ARTHROSCOPY Left 2017   • KNEE SURGERY     • UPPER GASTROINTESTINAL ENDOSCOPY         Current Outpatient Medications   Medication Sig Dispense Refill   • lamoTRIgine (LaMICtal) 25 mg tablet Take one tablet (25 mg) for two weeks po then take two tablets (50 mg) for two weeks   Po daily 42 tablet 0   • al mag oxide-diphenhydramine-lidocaine viscous (MAGIC MOUTHWASH) 1:1:1 suspension Swish and spit 10 mL every 4 (four) hours as needed for mouth pain or discomfort 200 mL 0   • albuterol (PROVENTIL HFA,VENTOLIN HFA) 90 mcg/act inhaler Inhale 1-2 puffs as needed     • ALPRAZolam (XANAX) 0 25 mg tablet TAKE ONE TABLET BY MOUTH EVERY MORNING AS NEEDED FOR ANXIETY OR PANIC ATTACK 30 tablet 0   • Blood Glucose Monitoring Suppl (OneTouch Verio) w/Device KIT Use to test blood sugar three times a day (Patient not taking: No sig reported) 1 kit 0   • DULoxetine (CYMBALTA) 60 mg delayed release capsule TAKE ONE CAPSULE BY MOUTH TWICE A DAY 60 capsule 1   • esomeprazole (NexIUM) 20 mg capsule TAKE ONE CAPSULE BY MOUTH EVERY DAY IN THE EARLY MORNING 30 capsule 9   • glucose blood (OneTouch Verio) test strip Test blood sugar three times a day (Patient not taking: No sig reported) 300 each 3   • Lancets (onetouch ultrasoft) lancets Test blood sugar three times a day (Patient not taking: No sig reported) 300 each 3   • methylPREDNISolone 4 MG tablet therapy pack Use as directed on package 1 tablet 0   • norgestimate-ethinyl estradiol (ORTHO-CYCLEN) 0 25-35 MG-MCG per tablet Take 1 tablet daily, skip placebo pills, continuous contraceptive 84 tablet 4   • zolpidem (AMBIEN) 10 mg tablet TAKE ONE TABLET BY MOUTH EVERY DAY AT BEDTIME AS NEEDED FOR SLEEP 30 tablet 0     No current facility-administered medications for this visit  No Known Allergies    The following portions of the patient's history were reviewed and updated as appropriate: allergies, current medications, past family history, past medical history, past social history, past surgical history and problem list     OBJECTIVE:     Mental Status Examination:    Appearance calm and cooperative , adequate hygiene and grooming and good eye contact    Mood anxious   Affect affect appropriate    Speech a normal rate   Thought Processes normal thought processes   Hallucinations no hallucinations present    Thought Content no delusions   Abnormal Thoughts no suicidal thoughts  and no homicidal thoughts    Orientation  oriented to person and place and time   Remote Memory short term memory intact and long term memory intact   Attention Span concentration intact   Intellect Appears to be of Average Intelligence   Insight Insight intact   Judgement judgment was intact   Muscle Strength Muscle strength and tone were normal   Language no difficulty naming common objects   Fund of Knowledge displays adequate knowledge of current events   Pain none   Pain Scale 0       Laboratory Results: No results found for this or any previous visit  Assessment/Plan:       Diagnoses and all orders for this visit:    Mood disorder ([facility] )  -     lamoTRIgine (LaMICtal) 25 mg tablet; Take one tablet (25 mg) for two weeks po then take two tablets (50 mg) for two weeks   Po daily    Generalized anxiety disorder    Moderate episode of recurrent major depressive disorder [facility])    Other fatigue          Treatment Recommendations- Risks Benefits      Immediate Medical/Psychiatric/Psychotherapy Treatments and Any Precautions:  reviewed medication continue with treatment plan stop rexulti and start lamictal     Risks, Benefits And Possible Side Effects Of Medications: Lamictal      Psychotherapy Provided: 30 min  Supportive therapy  Medication evaluation  Mood assessment  Medication education, report a rash asap  Coping skills and encouraged IOP    Goals discussed in session:stable mood     Treatment Plan:    Completed and signed during the session: Not applicable - Treatment Plan not due at this session enacted July 6, 2020, updated 10/5/2020, 5/10/2021, 11/9/2021, 5/23/2022, 11/17/2022        Romy Reina, PhD 12/26/22

## 2022-12-26 NOTE — PATIENT INSTRUCTIONS
Continue medications  Call with problems   or concerns   Stop Rexulti start Lamical, call with a rash

## 2023-01-11 ENCOUNTER — TELEMEDICINE (OUTPATIENT)
Dept: BEHAVIORAL/MENTAL HEALTH CLINIC | Facility: CLINIC | Age: 23
End: 2023-01-11

## 2023-01-11 DIAGNOSIS — F33.1 MODERATE EPISODE OF RECURRENT MAJOR DEPRESSIVE DISORDER (HCC): Primary | ICD-10-CM

## 2023-01-11 DIAGNOSIS — F41.1 GENERALIZED ANXIETY DISORDER: ICD-10-CM

## 2023-01-11 NOTE — PSYCH
This note was not shared with the patient due to this is a psychotherapy note    Virtual Regular Visit    Verification of patient location:    Patient is located in the following state in which I hold an active license [place]      Assessment/Plan:    Problem List Items Addressed This Visit        Other    Anxiety disorder    Moderate episode of recurrent major depressive disorder ([facility] [address] ) - Primary       Goals addressed in session: Goal 1          Reason for visit is   Chief Complaint   Patient presents with   • Virtual Regular Visit        Encounter provider Jo Street    Provider located at [address]  [place] [address]  [phone]      Recent Visits  No visits were found meeting these conditions  Showing recent visits within past 7 days and meeting all other requirements  Today's Visits  Date Type Provider Dept   01/11/23 Telemedicine Jo Street [facility] [place]   Showing today's visits and meeting all other requirements  Future Appointments  No visits were found meeting these conditions  Showing future appointments within next 150 days and meeting all other requirements       The patient was identified by name and date of birth  Elizabeth Beltre was informed that this is a telemedicine visit and that the visit is being conducted throughthe [email or web address] platform  She agrees to proceed     My office door was closed  No one else was in the room  She acknowledged consent and understanding of privacy and security of the video platform  The patient has agreed to participate and understands they can discontinue the visit at any time  Patient is aware this is a billable service  Psychotherapy Provided: Individual Psychotherapy 45 minutes     Length of time in session: 45 minutes, follow up in 2 week    Encounter Diagnosis     ICD-10-CM    1   Moderate episode of recurrent major depressive disorder ([facility] )  F33 1       2  Generalized anxiety disorder  F41 1           Goals addressed in session: Goal 1     Data: Writer met with Leidy Hernandez for an individual psychotherapy session  Leidy Hernandez reported that she has been "ok " Leidy Hernandez reported that since taking new medication, she has noticed that she is able to experience emotions whereas before she felt numbness  Leidy Hernandez reported that this is overwhelming and that she has been sensitive  Writer acknowledged the adjustment of experiencing emotions and also discussed the benefits, including the potential success of coping skills at this time  Leidy Hernandez shared that she failed two of her classes at the close of last semester and now has to make them up this coming semester  Leidy Hernandez reported that she continues to have low motivation, but does have a plan in mind to help make her more successful this semester  Leidy Hernandez shared that her father has been upset with her because she has not been working much  Leidy Hernandez reported that she does intend on looking for another job, but again, low motivation  Leidy Hernandez shared that she is now in a relationship and that this person has been a good addition to her life  Writer actively listened as Leidy Hernandez shared and spent time processing thoughts and feelings  Writer utilized CBT throughout session  Writer addressed concerns regarding lack of motivation and spent time re-directing unhelpful thought processes  Assessment: Leidy Hernandez presented in a depressed mood with congruent affect  Leidy Hernandez was engaged and oriented X3  Eye contact was good, speech was of normal rate and tone  Thought content was normal; insight and judgement were intact  Depressive symptoms were present including lack of motivation, negative thoughts and mildly depressed mood  SI/HI not reported or observed during session  Plan: Continue with individual psychotherapy      Current suicide risk : Low     Behavioral Health Treatment Plan [facility]: Diagnosis and Treatment Plan explained to Antonieta Martins relates understanding diagnosis and is agreeable to Treatment Plan   Yes     Visit start and stop times:    01/11/23  Start Time: 0900  Stop Time: 0945  Total Visit Time: 45 minutes

## 2023-01-16 ENCOUNTER — TELEMEDICINE (OUTPATIENT)
Dept: PSYCHIATRY | Facility: CLINIC | Age: 23
End: 2023-01-16

## 2023-01-16 DIAGNOSIS — F33.1 MODERATE EPISODE OF RECURRENT MAJOR DEPRESSIVE DISORDER (HCC): ICD-10-CM

## 2023-01-16 DIAGNOSIS — F41.1 GENERALIZED ANXIETY DISORDER: ICD-10-CM

## 2023-01-16 DIAGNOSIS — F42.4 DERMATILLOMANIA: ICD-10-CM

## 2023-01-16 DIAGNOSIS — F39 MOOD DISORDER (HCC): ICD-10-CM

## 2023-01-16 DIAGNOSIS — R45.86 MOOD SWINGS: ICD-10-CM

## 2023-01-16 DIAGNOSIS — R79.89 ABNORMAL TSH: Primary | ICD-10-CM

## 2023-01-16 RX ORDER — LAMOTRIGINE 25 MG/1
TABLET ORAL
Qty: 92 TABLET | Refills: 3 | Status: SHIPPED | OUTPATIENT
Start: 2023-01-16

## 2023-01-16 NOTE — PATIENT INSTRUCTIONS
Increase lamictal to 75 mg for two weeks then 100 mg for two weeks  Continue medications  Call with problems or concerns

## 2023-01-16 NOTE — BH TREATMENT PLAN
TREATMENT PLAN (Medication Management Only)         [facility]     Name and Date of Michelle russell cherie  2000  Date of Treatment Plan: July 6, 2020, updated 10/5/2020, 5/10/2021, 11/9/2021, 5/23/2022, 1/16/2023  Diagnosis/Diagnoses:    1  Persistent mood (affective) disorder, unspecified ([address] )    2  Anxiety disorder, unspecified type       Strengths/Personal Resources for Self-Care: supportive family, supportive friends, taking medications as prescribed  Area/Areas of need (in own words): anxiety, depression  1          Long Term Goal: maintain mood stability  Target Date: 6 months - 7/16/2023  Person/Persons responsible for completion of goal: Myriam  2          Short Term Objective (s) - How will we reach this goal?:   A  Provider new recommended medication/dosage changes and/or continue medication(s): continue current medications as prescribed  B  stable moods  C  take medication as prescribed, therapy  Target Date: 6 months - 7/16/2023  Person/Persons Responsible for Completion of Goal: Myriam  Progress Towards Goals: continuing treatment  Treatment Modality: medication management every 3 months  Review due 180 days from date of this plan: 6 months - 7/16/2023  Expected length of service: ongoing treatment  My Physician/PA/NP and I have developed this plan together and I agree to work on the goals and objectives  I understand the treatment goals that were developed for my treatment

## 2023-01-16 NOTE — PSYCH
Virtual Regular Visit    Problem List Items Addressed This Visit        Other    Abnormal TSH - Primary    Anxiety disorder    Moderate episode of recurrent major depressive disorder (HCC)    Mood disorder ([facility] [address] )     Reason for visit is   Chief Complaint   Patient presents with   • Anxiety   • Panic Attack   • Medication Management   • Depression     Encounter provider Chani Vega, PhD    Provider located at [address] [place] [address]  [address]  [place] [address]  [phone]    Recent Visits  No visits were found meeting these conditions  Showing recent visits within past 7 days and meeting all other requirements  Today's Visits  Date Type Provider Dept   01/16/23 Telemedicine Chani Vega, PhD [facility] [place]   Showing today's visits and meeting all other requirements  Future Appointments  No visits were found meeting these conditions  Showing future appointments within next 150 days and meeting all other requirements       After connecting through [employer], the patient was identified by name and date of birth  Shannan Perez was informed that this is a telemedicine visit and that the visit is being conducted through the [address] [place] [address] Now platform  She agrees to proceed  which may not be secure and therefore, might not be HIPAA-compliant  My office door was closed  No one else was in the room  She acknowledged consent and understanding of privacy and security of the video platform  The patient has agreed to participate and understands they can discontinue the visit at any time  SUBJECTIVE:    Shannan Perez is a 23 y o  female with a history of LUZMARIA, Panic, Depression, mood swings seen for medication management and mood assessment  [facility] Sample reports she is feeling better, less depressed  Anxiety continues regarding school, money, work, thinking that people are angry at her  She reports eating and sleeping   Has a boyfriend and he treats her well  Xanax helps "a little" could be stronger  Lost medication  Takes medication as prescribed, tries to deep breathe to get through a panic attack  Family are supportive  Attends therapy  HPI ROS Appetite Changes and Sleep: normal appetite and normal energy level    Review Of Systems:     Mood Anxiety, Depression and Emotional Lability   Behavior Normal    Thought Content Disturbing Thoughts, Feelings   General Emotional Problems   Personality Normal   Other Psych Symptoms Normal   Constitutional As Noted in HPI   ENT As Noted in HPI   Cardiovascular As Noted in HPI   Respiratory As Noted in HPI   Gastrointestinal As Noted in HPI   Genitourinary As Noted in HPI   Musculoskeletal As Noted in HPI   Integumentary As Noted in HPI   Neurological As Noted in HPI   Endocrine Normal    Other Symptoms Normal        Substance Abuse History:    Social History     Substance and Sexual Activity   Drug Use Yes   • Types: Marijuana       Family Psychiatric History:     Family History   Problem Relation Age of Onset   • Anxiety disorder Mother    • Depression Mother    • Asthma Mother    • Crohn's disease Mother    • Bipolar disorder Mother    • Cervical cancer Mother    • Depression Maternal Aunt    • Anxiety disorder Maternal Aunt    • Anxiety disorder Maternal Uncle    • Depression Maternal Grandfather    • Hypertension Maternal Grandmother    • Alcohol abuse Paternal Grandfather    • Breast cancer Family        Social History     Socioeconomic History   • Marital status: Single     Spouse name: Not on file   • Number of children: 0   • Years of education: Not on file   • Highest education level: Some college, no degree   Occupational History   • Not on file   Tobacco Use   • Smoking status: Never   • Smokeless tobacco: Never   • Tobacco comments:     vape daily   Vaping Use   • Vaping Use: Every day   • Substances: Nicotine, Flavoring   Substance and Sexual Activity   • Alcohol use:  Yes Alcohol/week: 2 0 standard drinks     Types: 2 Shots of liquor per week     Comment: socially   • Drug use: Yes     Types: Marijuana   • Sexual activity: Yes     Partners: Male   Other Topics Concern   • Not on file   Social History Narrative    12th grade     Social Determinants of Health     Financial Resource Strain: Not on file   Food Insecurity: Not on file   Transportation Needs: Not on file   Physical Activity: Not on file   Stress: Not on file   Social Connections: Not on file   Intimate Partner Violence: Not on file   Housing Stability: Not on file       Past Medical History:   Diagnosis Date   • Anxiety    • Asthma    • Depression    • Head injury    • High triglycerides     total triglyceride 187 repeat normal 70   • Psychiatric disorder        Past Surgical History:   Procedure Laterality Date   • KNEE ARTHROSCOPY Right 2015   • KNEE ARTHROSCOPY Left 2013   • KNEE ARTHROSCOPY Left 2017   • KNEE SURGERY     • UPPER GASTROINTESTINAL ENDOSCOPY         Current Outpatient Medications   Medication Sig Dispense Refill   • al mag oxide-diphenhydramine-lidocaine viscous (MAGIC MOUTHWASH) 1:1:1 suspension Swish and spit 10 mL every 4 (four) hours as needed for mouth pain or discomfort 200 mL 0   • albuterol (PROVENTIL HFA,VENTOLIN HFA) 90 mcg/act inhaler Inhale 1-2 puffs as needed     • ALPRAZolam (XANAX) 0 25 mg tablet TAKE ONE TABLET BY MOUTH EVERY MORNING AS NEEDED FOR ANXIETY OR PANIC ATTACK 30 tablet 0   • Blood Glucose Monitoring Suppl (OneTouch Verio) w/Device KIT Use to test blood sugar three times a day (Patient not taking: No sig reported) 1 kit 0   • DULoxetine (CYMBALTA) 60 mg delayed release capsule TAKE ONE CAPSULE BY MOUTH TWICE A DAY 60 capsule 1   • esomeprazole (NexIUM) 20 mg capsule TAKE ONE CAPSULE BY MOUTH EVERY DAY IN THE EARLY MORNING 30 capsule 9   • glucose blood (OneTouch Verio) test strip Test blood sugar three times a day (Patient not taking: No sig reported) 300 each 3   • lamoTRIgine (LaMICtal) 25 mg tablet Take one tablet (25 mg) for two weeks po then take two tablets (50 mg) for two weeks  Po daily 42 tablet 0   • Lancets (onetouch ultrasoft) lancets Test blood sugar three times a day (Patient not taking: No sig reported) 300 each 3   • methylPREDNISolone 4 MG tablet therapy pack Use as directed on package 1 tablet 0   • norgestimate-ethinyl estradiol (ORTHO-CYCLEN) 0 25-35 MG-MCG per tablet Take 1 tablet daily, skip placebo pills, continuous contraceptive 84 tablet 4     No current facility-administered medications for this visit  No Known Allergies    The following portions of the patient's history were reviewed and updated as appropriate: allergies, current medications, past family history, past medical history, past social history, past surgical history and problem list     OBJECTIVE:     Mental Status Examination:    Appearance calm and cooperative , adequate hygiene and grooming and good eye contact    Mood depressed and anxious   Affect affect was constricted   Speech a normal rate   Thought Processes normal thought processes   Hallucinations no hallucinations present    Thought Content no delusions   Abnormal Thoughts no suicidal thoughts  and no homicidal thoughts    Orientation  oriented to person and place and time   Remote Memory short term memory intact and long term memory intact   Attention Span concentration intact   Intellect Appears to be of Average Intelligence   Insight Limited insight   Judgement judgment was limited   Muscle Strength Muscle strength and tone were normal   Language no difficulty naming common objects   Fund of Knowledge displays adequate knowledge of current events   Pain none   Pain Scale 0       Laboratory Results: No results found for this or any previous visit      Assessment/Plan:       Diagnoses and all orders for this visit:    Abnormal TSH    Generalized anxiety disorder    Moderate episode of recurrent major depressive disorder (HCC)    Mood disorder [facility])          Treatment Recommendations- Risks Benefits      Immediate Medical/Psychiatric/Psychotherapy Treatments and Any Precautions:  reviewed medication continue with treatment plan increase lamictal to 3 tabs per day for 2 weeks then 4 tabs per day for 2 wks and reschedule  Then if stable we will discontinue in 4 wks    Risks, Benefits And Possible Side Effects Of Medications:  {PSYCH RISK, BENEFITS AND POSSIBLE SIDE EFFECTS (Optional):95710    Controlled Medication Discussion: she was made aware of safe use and storage of medication and that I could not refill Xanax until it is due   She understood     Psychotherapy Provided: 30 min  Supportive therapy  Medication evaluation  Medication education  Coping with panic techniques    Goals discussed in session: improve mood stability       Treatment Plan:    Completed and signed during the session: Yes - Treatment Plan done but not signed at time of office visit due to:  Plan reviewed by video and verbal consent given due to [address] distancing enacted July 6, 2020, updated 10/5/2020, 5/10/2021, 11/9/2021, 5/23/2022, 1/16/2023      Inessa Trotter, PhD 01/16/23

## 2023-01-19 ENCOUNTER — TELEPHONE (OUTPATIENT)
Dept: PSYCHIATRY | Facility: CLINIC | Age: 23
End: 2023-01-19

## 2023-01-19 NOTE — TELEPHONE ENCOUNTER
- HbA1c well controlled at 5 8   - on SSI coverage per [employer] Pt wanted to know if she should increase her dosage of Lamictal  She is currently taking 75 mgs and would like to know if she should increase to 100 mgs  Please advice      01/20/23 I call Pt and RIZWANA

## 2023-01-25 ENCOUNTER — TELEMEDICINE (OUTPATIENT)
Dept: BEHAVIORAL/MENTAL HEALTH CLINIC | Facility: CLINIC | Age: 23
End: 2023-01-25

## 2023-01-25 DIAGNOSIS — F41.1 GENERALIZED ANXIETY DISORDER: ICD-10-CM

## 2023-01-25 DIAGNOSIS — F33.1 MODERATE EPISODE OF RECURRENT MAJOR DEPRESSIVE DISORDER (HCC): Primary | ICD-10-CM

## 2023-01-25 NOTE — PSYCH
This note was not shared with the patient due to this is a psychotherapy note    Virtual Regular Visit    Verification of patient location:    Patient is located in the following state in which I hold an active license [place]      Assessment/Plan:    Problem List Items Addressed This Visit        Other    Anxiety disorder    Moderate episode of recurrent major depressive disorder ([facility] [address] ) - Primary       Goals addressed in session: Goal 1 and Goal 2          Reason for visit is   Chief Complaint   Patient presents with   • Virtual Regular Visit        Encounter provider Solomon Montana    Provider located at [address]  [place] [address]  [phone]      Recent Visits  No visits were found meeting these conditions  Showing recent visits within past 7 days and meeting all other requirements  Today's Visits  Date Type Provider Dept   01/25/23 Telemedicine Solomon Montana [facility] [place]   Showing today's visits and meeting all other requirements  Future Appointments  No visits were found meeting these conditions  Showing future appointments within next 150 days and meeting all other requirements       The patient was identified by name and date of birth  Faby Lawson was informed that this is a telemedicine visit and that the visit is being conducted throughthe [employer] platform  She agrees to proceed     My office door was closed  No one else was in the room  She acknowledged consent and understanding of privacy and security of the video platform  The patient has agreed to participate and understands they can discontinue the visit at any time  Patient is aware this is a billable service  Behavioral Health Psychotherapy Progress Note    Psychotherapy Provided: Individual Psychotherapy     1  Moderate episode of recurrent major depressive disorder ([facility] [address] )        2  Generalized anxiety disorder            Goals addressed in session: Goal 1 and Goal 2     DATA: Writer met with Roney, virtually for an individual psychotherapy session  Roney reported that she has been better since last session  Roney reported that she believes her significant other has played a role in her improved mood as he makes her feel good about herself  Writer acknowledged the positive nature of improvement in Roney's mood and spent time discussing improving her self-worth for herself, in a lasting way and healthy relationships  Roney reported that she started the new semester at school and is doing well so far  Roney reported that she has been "writing things down" as she used to in the past which has been helpful with keeping her on task  Roney reported that there is some tension in her relationships with her parents because they are frustrated with Roney not working and that her bedroom is a mess  Roney called her bedroom a "depression room" reporting that there are clothes and garbage everywhere which makes her feel overwhelmed  Writer spent time discussing this with Roney and discussing how she decrease the overwhelming feeling by breaking down the work into smaller parts  Roney reported some ongoing anxiety which she has been managing with coping skills  Writer encouraged self-care and healthy coping  During this session, this clinician used the following therapeutic modalities: Cognitive Behavioral Therapy    Substance Abuse was not addressed during this session  If the client is diagnosed with a co-occurring substance use disorder, please indicate any changes in the frequency or amount of use: NA  Stage of change for addressing substance use diagnoses: No substance use/Not applicable    ASSESSMENT:  Earlie Nyhan presents with a Euthymic/ normal mood  her affect is Normal range and intensity, which is congruent, with her mood and the content of the session  The client has made progress on their goals      Roney was oriented X3 and engaged  Eye contact was good, speech was of normal rate and tone  Thought content was normal; insight and judgement were intact  Shayy Wilson presents with a none risk of suicide, none risk of self-harm, and none risk of harm to others  For any risk assessment that surpasses a "low" rating, a safety plan must be developed  A safety plan was indicated: no  If yes, describe in detail NA    PLAN: Between sessions, Shayy Wilson will begin cleaning her bedroom and focus on self-care  At the next session, the therapist will use Cognitive Behavioral Therapy to address depression and anxiety  Behavioral Health Treatment Plan and Discharge Planning: Shayy Wilson is aware of and agrees to continue to work on their treatment plan  They have identified and are working toward their discharge goals   yes    Visit start and stop times:    01/25/23  Start Time: 0904  Stop Time: 4075  Total Visit Time: 44 minutes

## 2023-01-28 ENCOUNTER — APPOINTMENT (OUTPATIENT)
Dept: RADIOLOGY | Facility: CLINIC | Age: 23
End: 2023-01-28

## 2023-01-28 ENCOUNTER — OFFICE VISIT (OUTPATIENT)
Dept: URGENT CARE | Facility: CLINIC | Age: 23
End: 2023-01-28

## 2023-01-28 VITALS
TEMPERATURE: 98.6 F | BODY MASS INDEX: 42.91 KG/M2 | WEIGHT: 250 LBS | OXYGEN SATURATION: 100 % | HEART RATE: 80 BPM | RESPIRATION RATE: 16 BRPM

## 2023-01-28 DIAGNOSIS — M54.50 LOW BACK PAIN, UNSPECIFIED BACK PAIN LATERALITY, UNSPECIFIED CHRONICITY, UNSPECIFIED WHETHER SCIATICA PRESENT: Primary | ICD-10-CM

## 2023-01-28 RX ORDER — PREDNISONE 10 MG/1
10 TABLET ORAL DAILY
Qty: 21 TABLET | Refills: 0 | Status: SHIPPED | OUTPATIENT
Start: 2023-01-28

## 2023-01-28 RX ORDER — CYCLOBENZAPRINE HCL 10 MG
10 TABLET ORAL 3 TIMES DAILY PRN
Qty: 30 TABLET | Refills: 0 | Status: SHIPPED | OUTPATIENT
Start: 2023-01-28

## 2023-01-28 RX ORDER — BREXPIPRAZOLE 2 MG/1
TABLET ORAL
COMMUNITY
Start: 2023-01-21

## 2023-01-28 NOTE — PROGRESS NOTES
[employer] Now        NAME: Binu Edge is a 23 y o  female  : 2000    MRN: [id number]  DATE: 2023  TIME: 1:57 PM    Assessment and Plan   Low back pain, unspecified back pain laterality, unspecified chronicity, unspecified whether sciatica present [M54 50]  1  Low back pain, unspecified back pain laterality, unspecified chronicity, unspecified whether sciatica present  XR ribs right w pa chest min 3 views    predniSONE 10 mg tablet    cyclobenzaprine (FLEXERIL) 10 mg tablet            Patient Instructions       Follow up with PCP in 3-5 days  Proceed to  ER if symptoms worsen  Chief Complaint     Chief Complaint   Patient presents with   • Back Pain     Pt presents with right quadrant mid back pain that started yesterday post coughing         History of Present Illness       HPI  Patient presents today complaining of right-sided flank pain ongoing since yesterday  Patient has been coughing a lot coughing fit patient states that it is a pulling type of pain  Patient denies any weakness numbness tingling or loss of sensation    Review of Systems   Review of Systems  Per hpi     Current Medications       Current Outpatient Medications:   •  albuterol (PROVENTIL HFA,VENTOLIN HFA) 90 mcg/act inhaler, Inhale 1-2 puffs as needed, Disp: , Rfl:   •  ALPRAZolam (XANAX) 0 25 mg tablet, TAKE ONE TABLET BY MOUTH EVERY MORNING AS NEEDED FOR ANXIETY OR PANIC ATTACK, Disp: 30 tablet, Rfl: 0  •  Blood Glucose Monitoring Suppl (OneTouch Verio) w/Device KIT, Use to test blood sugar three times a day, Disp: 1 kit, Rfl: 0  •  cyclobenzaprine (FLEXERIL) 10 mg tablet, Take 1 tablet (10 mg total) by mouth 3 (three) times a day as needed for muscle spasms, Disp: 30 tablet, Rfl: 0  •  DULoxetine (CYMBALTA) 60 mg delayed release capsule, TAKE ONE CAPSULE BY MOUTH TWICE A DAY, Disp: 60 capsule, Rfl: 1  •  esomeprazole (NexIUM) 20 mg capsule, TAKE ONE CAPSULE BY MOUTH EVERY DAY IN THE EARLY MORNING, Disp: 30 capsule, Rfl: 9  •  glucose blood (OneTouch Verio) test strip, Test blood sugar three times a day, Disp: 300 each, Rfl: 3  •  lamoTRIgine (LaMICtal) 25 mg tablet, Three tablets (75mg) daily po for two 2 weeks: then four tablets (100mg) daily po , Disp: 92 tablet, Rfl: 3  •  Lancets (onetouch ultrasoft) lancets, Test blood sugar three times a day, Disp: 300 each, Rfl: 3  •  norgestimate-ethinyl estradiol (ORTHO-CYCLEN) 0 25-35 MG-MCG per tablet, Take 1 tablet daily, skip placebo pills, continuous contraceptive, Disp: 84 tablet, Rfl: 4  •  predniSONE 10 mg tablet, Take 1 tablet (10 mg total) by mouth daily 6 tab day 1, 5 tab day 2, 4 tab day 3, 3 tab day 4, 2 tab day 5, 1 tab day 6, Disp: 21 tablet, Rfl: 0  •  Rexulti 2 MG tablet, , Disp: , Rfl:     Current Allergies     Allergies as of 01/28/2023   • (No Known Allergies)            The following portions of the patient's history were reviewed and updated as appropriate: allergies, current medications, past family history, past medical history, past social history, past surgical history and problem list      Past Medical History:   Diagnosis Date   • Anxiety    • Asthma    • Depression    • Head injury    • High triglycerides     total triglyceride 187 repeat normal 70   • Psychiatric disorder        Past Surgical History:   Procedure Laterality Date   • KNEE ARTHROSCOPY Right 2015   • KNEE ARTHROSCOPY Left 2013   • KNEE ARTHROSCOPY Left 2017   • KNEE SURGERY     • UPPER GASTROINTESTINAL ENDOSCOPY         Family History   Problem Relation Age of Onset   • Anxiety disorder Mother    • Depression Mother    • Asthma Mother    • Crohn's disease Mother    • Bipolar disorder Mother    • Cervical cancer Mother    • Depression Maternal Aunt    • Anxiety disorder Maternal Aunt    • Anxiety disorder Maternal Uncle    • Depression Maternal Grandfather    • Hypertension Maternal Grandmother    • Alcohol abuse Paternal Grandfather    • Breast cancer Family          Medications have been verified  Objective   Pulse 80   Temp 98 6 °F (37 °C)   Resp 16   Wt 113 kg (250 lb)   LMP 10/16/2022   SpO2 100%   BMI 42 91 kg/m²   Patient's last menstrual period was 10/16/2022  Physical Exam     Physical Exam  Constitutional:       General: She is not in acute distress  Appearance: Normal appearance  HENT:      Head: Normocephalic  Nose: No congestion or rhinorrhea  Mouth/Throat:      Mouth: Mucous membranes are moist       Pharynx: No oropharyngeal exudate or posterior oropharyngeal erythema  Eyes:      General:         Right eye: No discharge  Left eye: No discharge  Conjunctiva/sclera: Conjunctivae normal    Cardiovascular:      Rate and Rhythm: Normal rate and regular rhythm  Pulses: Normal pulses  Pulmonary:      Effort: Pulmonary effort is normal  No respiratory distress  Abdominal:      General: Abdomen is flat  There is no distension  Palpations: Abdomen is soft  Tenderness: There is no abdominal tenderness  Musculoskeletal:         General: Tenderness present  Cervical back: Neck supple  Comments: Tender to palpation R flank, tight musculature  Skin:     General: Skin is warm  Capillary Refill: Capillary refill takes less than 2 seconds  Neurological:      Mental Status: She is alert and oriented to person, place, and time

## 2023-02-01 ENCOUNTER — TELEMEDICINE (OUTPATIENT)
Dept: BEHAVIORAL/MENTAL HEALTH CLINIC | Facility: CLINIC | Age: 23
End: 2023-02-01

## 2023-02-01 DIAGNOSIS — F41.1 GENERALIZED ANXIETY DISORDER: ICD-10-CM

## 2023-02-01 DIAGNOSIS — F33.1 MODERATE EPISODE OF RECURRENT MAJOR DEPRESSIVE DISORDER (HCC): Primary | ICD-10-CM

## 2023-02-01 NOTE — PSYCH
This note was not shared with the patient due to this is a psychotherapy note    Virtual Regular Visit    Verification of patient location:    Patient is located in the following state in which I hold an active license [place]      Assessment/Plan:    Problem List Items Addressed This Visit        Other    Anxiety disorder    Moderate episode of recurrent major depressive disorder ([facility] [address] ) - Primary       Goals addressed in session: Goal 1          Reason for visit is   Chief Complaint   Patient presents with   • Virtual Regular Visit        Encounter provider Solomno Montana    Provider located at [address] [place], [place] [address]  [place] [address]  [phone]      Recent Visits  Date Type Provider Dept   01/25/23 [address] [place] [address] [facility] Therapist [facility]   Showing recent visits within past 7 days and meeting all other requirements  Today's Visits  Date Type Provider Dept   02/01/23 Telemedicine Solomon Montana [facility] Therapist [facility]   Showing today's visits and meeting all other requirements  Future Appointments  No visits were found meeting these conditions  Showing future appointments within next 150 days and meeting all other requirements       The patient was identified by name and date of birth  Faby Lawson was informed that this is a telemedicine visit and that the visit is being conducted throughthe [employer] platform  She agrees to proceed     My office door was closed  No one else was in the room  She acknowledged consent and understanding of privacy and security of the video platform  The patient has agreed to participate and understands they can discontinue the visit at any time  Patient is aware this is a billable service  Behavioral Health Psychotherapy Progress Note    Psychotherapy Provided: Individual Psychotherapy     1   Moderate episode of recurrent major depressive disorder ([facility] [address] )        2  Generalized anxiety disorder            Goals addressed in session: Goal 1     DATA: Writer met with katey Mathis, for an individual psychotherapy session  Rosalieemmie Raquel reported that she injured her back on Friday which made for an uneventful weekend  Chanel García reported that she did not clean her room as discussed last session and reported no reasoning for the lack of follow through other than "I just did not do it " Chanel García reported that she has been feeling slightly lonely as her friends have been busy with school and work  Rosalieemmie Ashupal went on to report that she has been feeling pressure from her parents about applying to a 4 year school  Chanel García reported that she is unsure of what she wants in the future  Writer spent time exploring this with Chanel García reported that she has tried thinking about what she wants in her future but has not found anything that sticks out to her  Apoloniatawny García reported being unsure of whether or not she wants to continue school  Chanel García reported that she also does not want to work full time  Writer acknowledged the reality that Chanel García enjoys being home with little responsibility, and also identified the challenges that this presents for her and her parents who are currently supporting her  Chanel García expressed understanding and an openness to further exploration of what she would like in the future  Writer encouraged Chanel García to speak to an  at her current school for guidance on potential future options in education - Chanel Wakefieldpal agreed to do this  During this session, this clinician used the following therapeutic modalities: Cognitive Behavioral Therapy    Substance Abuse was not addressed during this session   If the client is diagnosed with a co-occurring substance use disorder, please indicate any changes in the frequency or amount of use: NA  Stage of change for addressing substance use diagnoses: No substance use/Not applicable    ASSESSMENT:  Karol Garrett presents with a Dysthymic mood  her affect is Normal range and intensity, which is congruent, with her mood and the content of the session  The client has made progress on their goals  Livia Preston was engaged and oriented X3  Eye contact was good, speech was of normal rate and tone  Thought content was normal; insight and judgement were intact  Karol Garrett presents with a none risk of suicide, none risk of self-harm, and none risk of harm to others  For any risk assessment that surpasses a "low" rating, a safety plan must be developed  A safety plan was indicated: no  If yes, describe in detail NA    PLAN: Between sessions, Karol Garrett will contact her   At the next session, the therapist will use Cognitive Behavioral Therapy to address depression and anxiety  Behavioral Health Treatment Plan and Discharge Planning: Karol Garrett is aware of and agrees to continue to work on their treatment plan  They have identified and are working toward their discharge goals   yes    Visit start and stop times:    02/01/23  Start Time: 0900  Stop Time: 0941  Total Visit Time: 41 minutes

## 2023-02-06 ENCOUNTER — TELEPHONE (OUTPATIENT)
Dept: PSYCHIATRY | Facility: CLINIC | Age: 23
End: 2023-02-06

## 2023-02-15 ENCOUNTER — TELEMEDICINE (OUTPATIENT)
Dept: BEHAVIORAL/MENTAL HEALTH CLINIC | Facility: CLINIC | Age: 23
End: 2023-02-15

## 2023-02-15 DIAGNOSIS — F41.1 GENERALIZED ANXIETY DISORDER: ICD-10-CM

## 2023-02-15 DIAGNOSIS — F33.1 MODERATE EPISODE OF RECURRENT MAJOR DEPRESSIVE DISORDER (HCC): Primary | ICD-10-CM

## 2023-02-15 NOTE — PSYCH
This note was not shared with the patient due to this is a psychotherapy note    Virtual Regular Visit    Verification of patient location:    Patient is located in the following state in which I hold an active license [place]      Assessment/Plan:    Problem List Items Addressed This Visit        Other    Anxiety disorder    Moderate episode of recurrent major depressive disorder ([facility] [address] ) - Primary       Goals addressed in session: Goal 1          Reason for visit is   Chief Complaint   Patient presents with   • Virtual Regular Visit        Encounter provider Sary Johnson    Provider located at [address]  [place] [address]  [phone]      Recent Visits  No visits were found meeting these conditions  Showing recent visits within past 7 days and meeting all other requirements  Today's Visits  Date Type Provider Dept   02/15/23 Telemedicine Sary Johnson [facility] Therapist [facility]   Showing today's visits and meeting all other requirements  Future Appointments  No visits were found meeting these conditions  Showing future appointments within next 150 days and meeting all other requirements       The patient was identified by name and date of birth  Warner Monique was informed that this is a telemedicine visit and that the visit is being conducted throughthe [employer] platform  She agrees to proceed     My office door was closed  No one else was in the room  She acknowledged consent and understanding of privacy and security of the video platform  The patient has agreed to participate and understands they can discontinue the visit at any time  Patient is aware this is a billable service  Behavioral Health Psychotherapy Progress Note    Psychotherapy Provided: Individual Psychotherapy     1  Moderate episode of recurrent major depressive disorder ([facility] [address] )        2   Generalized anxiety disorder            Goals addressed in session: Goal 1     DATA: Writer engaged Vinny virtually for an individual psychotherapy session  Vinny reported that she has been struggling emotionally and feeling unmotivated  Vinny shared that her father has been arguing with about her bedroom not being clean  Vinny reported that she has made some progress with cleaning, but there is still a good amount to be done  Vinny reported that her lack of motivation has been impacting her school work as well  Vinny shared that she has not been getting her work done for her online classes  Writer expressed concern  Vinny went on to share that there has been some stress in her relationship with her significant other  Vinny shared the details  Writer recognized the impact of this relationship stress on Myriam's mood and motivation  Writer utilized thought challenging and processed Myriam's thoughts and feelings  Writer focused on strengths and increasing motivation during session  Vinny was able to verbalize a desire to make healthier choices for herself  Vinny committed to spending at least three hours completing school work today  During this session, this clinician used the following therapeutic modalities: Cognitive Behavioral Therapy    Substance Abuse was not addressed during this session  If the client is diagnosed with a co-occurring substance use disorder, please indicate any changes in the frequency or amount of use: NA  Stage of change for addressing substance use diagnoses: No substance use/Not applicable    ASSESSMENT:  Sony Obrien presents with a Dysthymic mood  her affect is Normal range and intensity, which is congruent, with her mood and the content of the session  The client has not made progress on their goals  Vinny was oriented X3  Eye contact was good, speech was of normal rate and tone  Thoughts were depressed  Insight and judgement were fair   Clay Naranjo Angelic Shanks presents with a none risk of suicide, none risk of self-harm, and none risk of harm to others  For any risk assessment that surpasses a "low" rating, a safety plan must be developed  A safety plan was indicated: no  If yes, describe in detail NA    PLAN: Between sessions, Nolvia García will spend at least three hours completing school work  At the next session, the therapist will use Cognitive Behavioral Therapy to address depression and anxiety  Behavioral Health Treatment Plan and Discharge Planning: Nolvia García is aware of and agrees to continue to work on their treatment plan  They have identified and are working toward their discharge goals   yes    Visit start and stop times:    02/15/23  Start Time: 0900  Stop Time: 3331  Total Visit Time: 47 minutes

## 2023-02-20 ENCOUNTER — TELEPHONE (OUTPATIENT)
Age: 23
End: 2023-02-20

## 2023-02-24 DIAGNOSIS — F39 MOOD DISORDER (HCC): ICD-10-CM

## 2023-02-24 DIAGNOSIS — F39 MOOD DISORDER (HCC): Primary | ICD-10-CM

## 2023-02-24 DIAGNOSIS — E11.65 TYPE 2 DIABETES MELLITUS WITH HYPERGLYCEMIA, WITHOUT LONG-TERM CURRENT USE OF INSULIN (HCC): ICD-10-CM

## 2023-02-24 DIAGNOSIS — F41.1 GENERALIZED ANXIETY DISORDER: ICD-10-CM

## 2023-02-24 RX ORDER — LAMOTRIGINE 25 MG/1
TABLET ORAL
Qty: 92 TABLET | Refills: 3 | OUTPATIENT
Start: 2023-02-24

## 2023-02-24 RX ORDER — LAMOTRIGINE 100 MG/1
100 TABLET ORAL DAILY
Qty: 30 TABLET | Refills: 3 | Status: SHIPPED | OUTPATIENT
Start: 2023-02-24 | End: 2023-03-02 | Stop reason: SDUPTHER

## 2023-02-24 NOTE — TELEPHONE ENCOUNTER
Medication Refill Request     Name of Medication lamoTRIgine (LaMICtal) 25 mg tablet  Dose/Frequency Three tablets (75mg) daily po for two 2 weeks: then four tablets (100mg) daily po.  Quantity 92  Verified pharmacy   [x]  Verified ordering Provider   [x]  Does patient have enough for the next 3 days? Yes [] No [x]  Does patient have a follow-up appointment scheduled? Yes [x] No []   If so when is appointment: 03/02/23 @ 2:30 PM

## 2023-03-01 ENCOUNTER — TELEMEDICINE (OUTPATIENT)
Dept: BEHAVIORAL/MENTAL HEALTH CLINIC | Facility: CLINIC | Age: 23
End: 2023-03-01

## 2023-03-01 DIAGNOSIS — F33.1 MODERATE EPISODE OF RECURRENT MAJOR DEPRESSIVE DISORDER (HCC): Primary | ICD-10-CM

## 2023-03-01 DIAGNOSIS — F41.1 GENERALIZED ANXIETY DISORDER: ICD-10-CM

## 2023-03-01 NOTE — PSYCH
No Call  No Show  No Charge    Rock Peppers no showed 03/01/23 appointment , staff called and left message to reschedule appointment     Treatment Plan not due at this session

## 2023-03-02 ENCOUNTER — OFFICE VISIT (OUTPATIENT)
Dept: PSYCHIATRY | Facility: CLINIC | Age: 23
End: 2023-03-02

## 2023-03-02 VITALS
HEIGHT: 64 IN | BODY MASS INDEX: 43.19 KG/M2 | DIASTOLIC BLOOD PRESSURE: 73 MMHG | SYSTOLIC BLOOD PRESSURE: 109 MMHG | HEART RATE: 82 BPM | WEIGHT: 253 LBS

## 2023-03-02 DIAGNOSIS — F33.1 MODERATE EPISODE OF RECURRENT MAJOR DEPRESSIVE DISORDER (HCC): ICD-10-CM

## 2023-03-02 DIAGNOSIS — F41.1 GAD (GENERALIZED ANXIETY DISORDER): Primary | ICD-10-CM

## 2023-03-02 DIAGNOSIS — F39 MOOD DISORDER (HCC): ICD-10-CM

## 2023-03-02 DIAGNOSIS — F41.1 GENERALIZED ANXIETY DISORDER: ICD-10-CM

## 2023-03-02 RX ORDER — ALPRAZOLAM 0.5 MG/1
0.5 TABLET ORAL 2 TIMES DAILY PRN
Qty: 60 TABLET | Refills: 0 | Status: SHIPPED | OUTPATIENT
Start: 2023-03-02

## 2023-03-02 RX ORDER — LAMOTRIGINE 100 MG/1
100 TABLET ORAL DAILY
Qty: 30 TABLET | Refills: 3 | Status: SHIPPED | OUTPATIENT
Start: 2023-03-02

## 2023-03-06 NOTE — PATIENT INSTRUCTIONS
Continue medications  Call with problems or concerns   Stop Rexulti and start Vraylar 1 5 mg at night

## 2023-03-06 NOTE — PSYCH
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  Boyfriend broke up with her, and "parents are always on her"  They expect that she be more busy than what she is  She denies suicidal ideation or self-harm urges  She attends therapy 1 time a week  Anxiety and depression are described as moderate  She is eating and sleeping  Takes medication as prescribed        HPI ROS Appetite Changes and Sleep: normal appetite and decreased energy    Review Of Systems:     Mood Anxiety and Depression   Behavior Normal    Thought Content Normal   General Emotional Problems   Personality Normal   Other Psych Symptoms Normal   Constitutional As Noted in HPI   ENT As Noted in HPI   Cardiovascular As Noted in HPI   Respiratory As Noted in HPI   Gastrointestinal As Noted in HPI   Genitourinary As Noted in HPI   Musculoskeletal As Noted in HPI   Integumentary As Noted in HPI   Neurological As Noted in HPI   Endocrine Diabetes   Other Symptoms Normal      Substance Abuse History:  Social History     Substance and Sexual Activity   Drug Use Yes   • Types: Marijuana       Family Psychiatric History:   Family History   Problem Relation Age of Onset   • Anxiety disorder Mother    • Depression Mother    • Asthma Mother    • Crohn's disease Mother    • Bipolar disorder Mother    • Cervical cancer Mother    • Depression Maternal Aunt    • Anxiety disorder Maternal Aunt    • Anxiety disorder Maternal Uncle    • Depression Maternal Grandfather    • Hypertension Maternal Grandmother    • Alcohol abuse Paternal Grandfather    • Breast cancer Family        The following portions of the patient's history were reviewed and updated as appropriate: allergies, current medications, past family history, past medical history, past social history, past surgical history and problem list     Social History     Socioeconomic History   • Marital status: Single     Spouse name: Not on file   • Number of children: 0   • Years of education: Not on file   • Highest education level: Some college, no degree   Occupational History   • Not on file   Tobacco Use   • Smoking status: Never     Passive exposure: Past   • Smokeless tobacco: Never   • Tobacco comments:     vape daily   Vaping Use   • Vaping Use: Every day   • Substances: Nicotine, Flavoring   Substance and Sexual Activity   • Alcohol use:  Yes     Alcohol/week: 2 0 standard drinks     Types: 2 Shots of liquor per week     Comment: socially   • Drug use: Yes     Types: Marijuana   • Sexual activity: Yes     Partners: Male   Other Topics Concern   • Not on file   Social History Narrative    12th grade     Social Determinants of Health     Financial Resource Strain: Not on file   Food Insecurity: Not on file   Transportation Needs: Not on file   Physical Activity: Not on file   Stress: Not on file   Social Connections: Not on file   Intimate Partner Violence: Not on file   Housing Stability: Not on file     Social History     Social History Narrative    12th grade       Objective:       Mental status:  Appearance calm and cooperative , adequate hygiene and grooming and good eye contact    Mood anxious   Affect affect appropriate    Speech a normal rate   Thought Processes normal thought processes   Hallucinations no hallucinations present    Thought Content no delusions   Abnormal Thoughts no suicidal thoughts  and no homicidal thoughts    Orientation  oriented to person and place and time   Remote Memory short term memory intact and long term memory intact   Attention Span concentration intact   Intellect Appears to be of Average Intelligence   Insight Limited insight   Judgement judgment was limited   Muscle Strength Muscle strength and tone were normal   Language no difficulty naming common objects   Fund of Knowledge displays adequate knowledge of current events   Pain none   Pain Scale 0       Assessment/Plan:       Diagnoses and all orders for this visit:    LUZMARIA (generalized anxiety disorder)  -     ALPRAZolam (XANAX) 0 5 mg tablet; Take 1 tablet (0 5 mg total) by mouth 2 (two) times a day as needed for anxiety    Mood disorder (HCC)  -     lamoTRIgine (LaMICtal) 100 mg tablet; Take 1 tablet (100 mg total) by mouth daily    Moderate episode of recurrent major depressive disorder (HCC)    Generalized anxiety disorder            Treatment Recommendations- Risks Benefits      Immediate Medical/Psychiatric/Psychotherapy Treatments and Any Precautions: Discontinue Rexulti start Vraylar 1 5 mg p o  encourage patient to reduce cannabis use    Risks, Benefits And Possible Side Effects Of Medications:  {PSYCH RISK, BENEFITS AND POSSIBLE SIDE EFFECTS (Optional):18213    Controlled Medication Discussion: She is aware of safe use and storage of medications  Psychotherapy Provided: 30 min Individual psychotherapy provided  Supportive therapy  Medication evaluation  Medication education  Discussed reducing cannabis use      Goals discussed in session: Mood stability    Treatment plan not due the session

## 2023-03-23 ENCOUNTER — OFFICE VISIT (OUTPATIENT)
Dept: PSYCHIATRY | Facility: CLINIC | Age: 23
End: 2023-03-23

## 2023-03-23 VITALS
BODY MASS INDEX: 42.88 KG/M2 | HEART RATE: 83 BPM | DIASTOLIC BLOOD PRESSURE: 96 MMHG | SYSTOLIC BLOOD PRESSURE: 140 MMHG | HEIGHT: 64 IN | WEIGHT: 251.2 LBS

## 2023-03-23 DIAGNOSIS — F41.1 GENERALIZED ANXIETY DISORDER: Primary | ICD-10-CM

## 2023-03-23 DIAGNOSIS — F39 MOOD DISORDER (HCC): ICD-10-CM

## 2023-03-23 DIAGNOSIS — F33.1 MODERATE EPISODE OF RECURRENT MAJOR DEPRESSIVE DISORDER (HCC): ICD-10-CM

## 2023-03-23 RX ORDER — LAMOTRIGINE 150 MG/1
150 TABLET ORAL DAILY
Qty: 30 TABLET | Refills: 3 | Status: SHIPPED | OUTPATIENT
Start: 2023-03-23

## 2023-03-26 NOTE — PATIENT INSTRUCTIONS
Continue medications  Call with problems or concerns   Lamictal 150 mg p o  daily  Obtain labs  Call for GI and therapy appointment  Set for IOP

## 2023-03-26 NOTE — PSYCH
"Visit Time    Visit Start Time: 3:50 pm  Visit Stop Time: 4:30 pm  Total Visit Duration: 40 minutes    Subjective:     Patient ID: Moon Troy is a 23 y o  female history of mood disorder, depression, anxiety seen for medication management and mood assessment  Abelardo Ash reports failing 3 classes in college due to not doing the work on line  She reports knowing when she has to do, but cannot get the motivation to do so  She has trouble sleeping  States she is not suicidal; however, she would not care if she   She reports depression is increased and he feels \"down and out  \"  She reports body image issues, being lonely, and wants to do things, but cannot get herself to do anything  She reports cutting down on smoking marijuana to 1 or 2 bowls a day  She reports intrusive thoughts  Abelardo Ash states \"lost what her purpose is in life  \"  Takes medications as prescribed  Xanax is reported to help her anxiety attacks  She stopped the Vraylar due to nausea, vomiting  Susiefracnie Mihir reports she has a history of GI problems and still feels nauseous every day  She did not obtain ordered lab work from November  Abelardo Ash is working at [employer] and reports they only give her maybe 2 shifts a week  Abelardo Ash has a few friends; however, describes limited support system  She attends therapy; however, has not seen her therapist in a couple weeks and does not have an appointment      HPI ROS Appetite Changes and Sleep: normal appetite and decreased energy    Review Of Systems:     Mood Anxiety, Depression and Emotional Lability   Behavior depressed   Thought Content Disturbing Thoughts, Feelings   General Relationship Problems, Emotional Problems and Sleep Disturbances   Personality Normal   Other Psych Symptoms Normal   Constitutional As Noted in HPI   ENT As Noted in HPI   Cardiovascular As Noted in HPI   Respiratory As Noted in HPI   Gastrointestinal As Noted in HPI   Genitourinary As Noted in HPI   Musculoskeletal As " Noted in HPI   Integumentary As Noted in HPI   Neurological As Noted in HPI   Endocrine DM   Other Symptoms Normal          Substance Abuse History:  Social History     Substance and Sexual Activity   Drug Use Yes   • Types: Marijuana       Family Psychiatric History:   Family History   Problem Relation Age of Onset   • Anxiety disorder Mother    • Depression Mother    • Asthma Mother    • Crohn's disease Mother    • Bipolar disorder Mother    • Cervical cancer Mother    • Depression Maternal Aunt    • Anxiety disorder Maternal Aunt    • Anxiety disorder Maternal Uncle    • Depression Maternal Grandfather    • Hypertension Maternal Grandmother    • Alcohol abuse Paternal Grandfather    • Breast cancer Family        The following portions of the patient's history were reviewed and updated as appropriate: allergies, current medications, past family history, past medical history, past social history, past surgical history and problem list     Social History     Socioeconomic History   • Marital status: Single     Spouse name: Not on file   • Number of children: 0   • Years of education: Not on file   • Highest education level: Some college, no degree   Occupational History   • Not on file   Tobacco Use   • Smoking status: Never     Passive exposure: Past   • Smokeless tobacco: Never   • Tobacco comments:     vape daily   Vaping Use   • Vaping Use: Every day   • Substances: Nicotine, Flavoring   Substance and Sexual Activity   • Alcohol use:  Yes     Alcohol/week: 2 0 standard drinks     Types: 2 Shots of liquor per week     Comment: socially   • Drug use: Yes     Types: Marijuana   • Sexual activity: Yes     Partners: Male   Other Topics Concern   • Not on file   Social History Narrative    12th grade     Social Determinants of Health     Financial Resource Strain: Not on file   Food Insecurity: Not on file   Transportation Needs: Not on file   Physical Activity: Not on file   Stress: Not on file   Social Connections: Not on file   Intimate Partner Violence: Not on file   Housing Stability: Not on file     Social History     Social History Narrative    12th grade       Objective:       Mental status:  Appearance adequate hygiene and grooming, restless and fidgety and good eye contact    Mood depressed and anxious   Affect affect appropriate    Speech a normal rate   Thought Processes normal thought processes   Hallucinations no hallucinations present    Thought Content no delusions   Abnormal Thoughts no suicidal thoughts  and no homicidal thoughts    Orientation  oriented to person and place and time   Remote Memory short term memory intact and long term memory intact   Attention Span concentration intact   Intellect Appears to be of Average Intelligence   Insight Limited insight   Judgement judgment was limited   Muscle Strength Muscle strength and tone were normal   Language no difficulty naming common objects   Fund of Knowledge displays adequate knowledge of current events   Pain none   Pain Scale 0       Assessment/Plan:       Diagnoses and all orders for this visit:    Generalized anxiety disorder  -     lamoTRIgine (LaMICtal) 150 MG tablet; Take 1 tablet (150 mg total) by mouth daily    Moderate episode of recurrent major depressive disorder (HCC)  -     lamoTRIgine (LaMICtal) 150 MG tablet; Take 1 tablet (150 mg total) by mouth daily            Treatment Recommendations- Risks Benefits      Immediate Medical/Psychiatric/Psychotherapy Treatments and Any Precautions:  reviewed medication continue with treatment plan, stop Vraylar, increase Lamictal to 150 mg daily  Discussed IOP, she declined because it was not effective in the past; however, it was virtual at the time  Encouraged to obtain labs to rule out any medical cause to increase her depression  Encouraged to make an appointment ASAP with her therapist   Suggested to Ova Seek that she should make an appointment also with her GI physician    Encouraged good nutrition and hydration  Risks, Benefits And Possible Side Effects Of Medications:  {PSYCH RISK, BENEFITS AND POSSIBLE SIDE EFFECTS (Optional):08400    Controlled Medication Discussion: She is aware of safe use and storage of medications     Psychotherapy Provided: 40 min Individual psychotherapy provided  Supportive therapy  Medication evaluation  Mood assessment  Medication education   Re- printed laboratory slips  Verbal contract for safety discussed, she will go to ER if increased depression occurs  Encouraged to consider IOP, she may have better results with in person meetings  Encourage GI appointment and therapy appointment    Call with problems or concerns    Goals discussed in session: Improve mood stability    Treatment plan not due

## 2023-03-27 ENCOUNTER — TELEPHONE (OUTPATIENT)
Dept: PSYCHIATRY | Facility: CLINIC | Age: 23
End: 2023-03-27

## 2023-03-27 ENCOUNTER — DOCUMENTATION (OUTPATIENT)
Dept: PSYCHIATRY | Facility: CLINIC | Age: 23
End: 2023-03-27

## 2023-03-27 DIAGNOSIS — F41.1 GENERALIZED ANXIETY DISORDER: ICD-10-CM

## 2023-03-27 DIAGNOSIS — F33.1 MODERATE EPISODE OF RECURRENT MAJOR DEPRESSIVE DISORDER (HCC): Primary | ICD-10-CM

## 2023-03-27 NOTE — TELEPHONE ENCOUNTER
Roney duran showed her appt with [facility] on Monday 3/27/2023   Waiting on what letter we are sending her

## 2023-03-27 NOTE — PROGRESS NOTES
Psychotherapy Discharge Summary    Preferred Name: Jessica Salazar  YOB: 2000    Admission date to psychotherapy: 5/12/21    Referred by: Dr Tasneem Miner    Presenting Problem: Major Depressive Disorder and Anxiety    Course of treatment included : individual therapy     Progress/Outcome of Treatment Goals (brief summary of course of treatment) Treatment included increasing healthy coping skills, navigating depression and anxiety, and addressing self-esteem  Outcomes were inconsistent due to client's inconsistency with adherence to treatment recommendations and schedule  Treatment Complications (if any): frequent no shows/late cancellation of appointments    Treatment Progress: fair    Current [facility] Provider: Dr Tanseem Miner    Discharge Medications include: Lamictal, Xanax    Discharge Date: 3/27/23    Discharge Diagnosis:   1  Moderate episode of recurrent major depressive disorder ([address] )        2   Generalized anxiety disorder            Criteria for Discharge: two or more unexcused absences for services    Aftercare recommendations include (include specific referral names and phone numbers, if appropriate): continue medication monitoring with Dr Tasneem Miner    Prognosis: fair

## 2023-03-28 NOTE — TELEPHONE ENCOUNTER
03/28/23 6:15 PM     The office's request has been received, reviewed, and the patient chart updated  The PCP has successfully been removed with a patient attribution note  This message will now be completed      Thank you  Maris Lazaro

## 2023-03-31 ENCOUNTER — TELEPHONE (OUTPATIENT)
Dept: PSYCHIATRY | Facility: CLINIC | Age: 23
End: 2023-03-31

## 2023-03-31 NOTE — TELEPHONE ENCOUNTER
DISCHARGE LETTER for Rene Gordon (certified and regular) placed in outgoing mail on 03/31/23      Article #:  [id number]    Address:  [address] [place] [id number]

## 2023-04-03 DIAGNOSIS — F41.1 GAD (GENERALIZED ANXIETY DISORDER): ICD-10-CM

## 2023-04-03 RX ORDER — ALPRAZOLAM 0.5 MG/1
TABLET ORAL
Qty: 60 TABLET | Refills: 0 | Status: SHIPPED | OUTPATIENT
Start: 2023-04-03

## 2023-04-19 ENCOUNTER — TELEPHONE (OUTPATIENT)
Dept: PSYCHIATRY | Facility: CLINIC | Age: 23
End: 2023-04-19

## 2023-04-19 NOTE — TELEPHONE ENCOUNTER
Pt call and wanted to set up an appointment with you . She want someone in the office to call her.

## 2023-04-25 ENCOUNTER — OFFICE VISIT (OUTPATIENT)
Dept: PSYCHIATRY | Facility: CLINIC | Age: 23
End: 2023-04-25

## 2023-04-25 VITALS
SYSTOLIC BLOOD PRESSURE: 127 MMHG | BODY MASS INDEX: 42.34 KG/M2 | HEIGHT: 64 IN | HEART RATE: 112 BPM | WEIGHT: 248 LBS | DIASTOLIC BLOOD PRESSURE: 84 MMHG

## 2023-04-25 DIAGNOSIS — F33.1 MODERATE EPISODE OF RECURRENT MAJOR DEPRESSIVE DISORDER (HCC): ICD-10-CM

## 2023-04-25 DIAGNOSIS — F39 MOOD DISORDER (HCC): ICD-10-CM

## 2023-04-25 DIAGNOSIS — F41.1 GENERALIZED ANXIETY DISORDER: Primary | ICD-10-CM

## 2023-04-25 DIAGNOSIS — F41.1 GAD (GENERALIZED ANXIETY DISORDER): ICD-10-CM

## 2023-04-25 RX ORDER — ALPRAZOLAM 0.5 MG/1
0.5 TABLET ORAL 2 TIMES DAILY PRN
Qty: 60 TABLET | Refills: 0 | Status: SHIPPED | OUTPATIENT
Start: 2023-04-25

## 2023-04-25 NOTE — PSYCH
Visit Time    Visit Start Time: 3:00 PM  Visit Stop Time: 3:30 PM  Total Visit Duration: 30 minutes    Subjective:     Patient ID: Alexandria Dumont is a 26 y o  female history of anxiety, depression, mood swings seen for medication management and mood assessment  Rachelles Shenggemma reports her mood is a little better, she has had a couple of suicidal ideas that were fleeting without intent  Appetite is poor and continues to be nauseous she reports having a GI appointment  She reports sleeping well  Spoke to family about taking off a semester and needing to do the fall semester they were supportive of her  She has not obtained lab work as ordered, and will do within the next 2 weeks  Takes medication as prescribed  Looking for a new job due to lack of hours and present job  Discussed plan to transfer to a 4-year college and live on campus      HPI ROS Appetite Changes and Sleep: decreased appetite and decreased energy    Review Of Systems:     Mood Anxiety and Depression   Behavior Normal    Thought Content Normal   General Relationship Problems and Emotional Problems   Personality Normal   Other Psych Symptoms Normal   Constitutional As Noted in HPI   ENT As Noted in HPI   Cardiovascular As Noted in HPI   Respiratory As Noted in HPI   Gastrointestinal As Noted in HPI   Genitourinary As Noted in HPI   Musculoskeletal As Noted in HPI   Integumentary As Noted in HPI   Neurological As Noted in HPI   Endocrine Normal    Other Symptoms Normal      Substance Abuse History:  Social History     Substance and Sexual Activity   Drug Use Yes   • Types: Marijuana       Family Psychiatric History:   Family History   Problem Relation Age of Onset   • Anxiety disorder Mother    • Depression Mother    • Asthma Mother    • Crohn's disease Mother    • Bipolar disorder Mother    • Cervical cancer Mother    • Depression Maternal Aunt    • Anxiety disorder Maternal Aunt    • Anxiety disorder Maternal Uncle    • Depression Maternal Grandfather    • Hypertension Maternal Grandmother    • Alcohol abuse Paternal Grandfather    • Breast cancer Family        The following portions of the patient's history were reviewed and updated as appropriate: allergies, current medications, past family history, past medical history, past social history, past surgical history and problem list     Social History     Socioeconomic History   • Marital status: Single     Spouse name: Not on file   • Number of children: 0   • Years of education: Not on file   • Highest education level: Some college, no degree   Occupational History   • Not on file   Tobacco Use   • Smoking status: Never     Passive exposure: Past   • Smokeless tobacco: Never   • Tobacco comments:     vape daily   Vaping Use   • Vaping Use: Every day   • Substances: Nicotine, Flavoring   Substance and Sexual Activity   • Alcohol use:  Yes     Alcohol/week: 2 0 standard drinks     Types: 2 Shots of liquor per week     Comment: socially   • Drug use: Yes     Types: Marijuana   • Sexual activity: Yes     Partners: Male   Other Topics Concern   • Not on file   Social History Narrative    12th grade     Social Determinants of Health     Financial Resource Strain: Not on file   Food Insecurity: Not on file   Transportation Needs: Not on file   Physical Activity: Not on file   Stress: Not on file   Social Connections: Not on file   Intimate Partner Violence: Not on file   Housing Stability: Not on file     Social History     Social History Narrative    12th grade       Objective:       Mental status:  Appearance calm and cooperative , adequate hygiene and grooming and good eye contact    Mood depressed and anxious   Affect affect appropriate    Speech a normal rate   Thought Processes normal thought processes   Hallucinations no hallucinations present    Thought Content no delusions   Abnormal Thoughts no suicidal thoughts  and no homicidal thoughts    Orientation  oriented to person and place and time Remote Memory short term memory intact and long term memory intact   Attention Span concentration intact   Intellect Appears to be of Average Intelligence   Insight Insight intact   Judgement judgment was intact   Muscle Strength Muscle strength and tone were normal   Language no difficulty naming common objects   Fund of Knowledge displays adequate knowledge of current events   Pain stomach pain occasionally   Pain Scale 3       Assessment/Plan:       Diagnoses and all orders for this visit:    Generalized anxiety disorder    LUZMARIA (generalized anxiety disorder)  -     ALPRAZolam (XANAX) 0 5 mg tablet; Take 1 tablet (0 5 mg total) by mouth 2 (two) times a day as needed for anxiety    Moderate episode of recurrent major depressive disorder (HCC)    Mood disorder (HCC)            Treatment Recommendations- Risks Benefits      Immediate Medical/Psychiatric/Psychotherapy Treatments and Any Precautions:  reviewed medication continue with treatment plan    Risks, Benefits And Possible Side Effects Of Medications:  {PSYCH RISK, BENEFITS AND POSSIBLE SIDE EFFECTS (Optional):71338    Controlled Medication Discussion: She is aware of safe use and storage of medications     Psychotherapy Provided: 30 min Individual psychotherapy provided  Supportive therapy  Medication evaluation  Mood assessment  Discussed goal of stable mood prior to moving to live on campus    Encouraged to obtain lab tests    Goals discussed in session: Stable mood    Treatment plan not due

## 2023-05-02 ENCOUNTER — OFFICE VISIT (OUTPATIENT)
Dept: FAMILY MEDICINE CLINIC | Facility: CLINIC | Age: 23
End: 2023-05-02

## 2023-05-02 VITALS
HEIGHT: 65 IN | HEART RATE: 100 BPM | SYSTOLIC BLOOD PRESSURE: 118 MMHG | DIASTOLIC BLOOD PRESSURE: 74 MMHG | WEIGHT: 247 LBS | RESPIRATION RATE: 16 BRPM | TEMPERATURE: 96.6 F | BODY MASS INDEX: 41.15 KG/M2

## 2023-05-02 DIAGNOSIS — F39 MOOD DISORDER (HCC): ICD-10-CM

## 2023-05-02 DIAGNOSIS — E11.65 TYPE 2 DIABETES MELLITUS WITH HYPERGLYCEMIA, WITHOUT LONG-TERM CURRENT USE OF INSULIN (HCC): ICD-10-CM

## 2023-05-02 DIAGNOSIS — J20.9 ACUTE BRONCHITIS, UNSPECIFIED ORGANISM: Primary | ICD-10-CM

## 2023-05-02 PROBLEM — R55 SYNCOPE: Status: RESOLVED | Noted: 2019-08-14 | Resolved: 2023-05-02

## 2023-05-02 PROBLEM — R10.13 EPIGASTRIC PAIN: Status: RESOLVED | Noted: 2020-06-19 | Resolved: 2023-05-02

## 2023-05-02 PROBLEM — R19.7 DIARRHEA: Status: RESOLVED | Noted: 2020-11-04 | Resolved: 2023-05-02

## 2023-05-02 PROBLEM — K13.79 MOUTH PAIN: Status: RESOLVED | Noted: 2022-10-18 | Resolved: 2023-05-02

## 2023-05-02 PROBLEM — F32.A DEPRESSION: Status: RESOLVED | Noted: 2020-07-06 | Resolved: 2023-05-02

## 2023-05-02 PROBLEM — S60.211A CONTUSION OF RIGHT WRIST: Status: RESOLVED | Noted: 2021-10-26 | Resolved: 2023-05-02

## 2023-05-02 PROBLEM — F42.4 DERMATILLOMANIA: Status: RESOLVED | Noted: 2021-01-25 | Resolved: 2023-05-02

## 2023-05-02 PROBLEM — S62.524D CLOSED NONDISPLACED FRACTURE OF DISTAL PHALANX OF RIGHT THUMB WITH ROUTINE HEALING: Status: RESOLVED | Noted: 2020-02-25 | Resolved: 2023-05-02

## 2023-05-02 PROBLEM — N93.0 BLEEDING AFTER INTERCOURSE: Status: RESOLVED | Noted: 2022-10-26 | Resolved: 2023-05-02

## 2023-05-02 PROBLEM — R79.89 ABNORMAL TSH: Status: RESOLVED | Noted: 2021-06-21 | Resolved: 2023-05-02

## 2023-05-02 PROBLEM — R61 SWEATING INCREASE: Status: RESOLVED | Noted: 2021-10-20 | Resolved: 2023-05-02

## 2023-05-02 PROBLEM — R45.86 MOOD SWINGS: Status: RESOLVED | Noted: 2022-02-22 | Resolved: 2023-05-02

## 2023-05-02 PROBLEM — N94.6 DYSMENORRHEA IN ADOLESCENT: Status: RESOLVED | Noted: 2019-05-13 | Resolved: 2023-05-02

## 2023-05-02 PROBLEM — Z91.199 FAILURE TO ATTEND APPOINTMENT: Status: RESOLVED | Noted: 2021-07-07 | Resolved: 2023-05-02

## 2023-05-02 PROBLEM — R13.10 DYSPHAGIA: Status: RESOLVED | Noted: 2020-11-04 | Resolved: 2023-05-02

## 2023-05-02 PROBLEM — R63.5 WEIGHT GAIN: Status: RESOLVED | Noted: 2021-06-21 | Resolved: 2023-05-02

## 2023-05-02 PROBLEM — J45.909 ASTHMA: Status: RESOLVED | Noted: 2020-11-20 | Resolved: 2023-05-02

## 2023-05-02 PROBLEM — M25.531 RIGHT WRIST PAIN: Status: RESOLVED | Noted: 2021-10-26 | Resolved: 2023-05-02

## 2023-05-02 RX ORDER — BENZONATATE 200 MG/1
200 CAPSULE ORAL 3 TIMES DAILY PRN
Qty: 20 CAPSULE | Refills: 0 | Status: SHIPPED | OUTPATIENT
Start: 2023-05-02

## 2023-05-02 RX ORDER — AZITHROMYCIN 250 MG/1
TABLET, FILM COATED ORAL
Qty: 6 TABLET | Refills: 0 | Status: SHIPPED | OUTPATIENT
Start: 2023-05-02 | End: 2023-05-07

## 2023-05-02 RX ORDER — ALBUTEROL SULFATE 90 UG/1
1-2 AEROSOL, METERED RESPIRATORY (INHALATION) AS NEEDED
Qty: 18 G | Refills: 0 | Status: SHIPPED | OUTPATIENT
Start: 2023-05-02

## 2023-05-02 NOTE — ASSESSMENT & PLAN NOTE
"[facility]    Hospitalist Progress Note    Assessment & Plan   Elizabeth Galicia is a 73 year old female with complex PMHx including microscopic polyangiitis, DM II (diet controlled), hyperlipidemia, GERD, anxiety and obesity who was admitted on 11/30/2017 with acute renal failure and UTI with suggestion of sepsis.     Abnormal UA with concern for possible sepsis on admission: Resolved.  Presented with complaints of \"feeling unwell\" in the preceding 8-9 days with mild fever and diffuse papular rash. Was seen in Urgent Care on 11/27 for evaluation of fever (T 101.4) and it was thought that she may have had pneumonia -- she was started on Levaquin and Robitussin. Saw her PCP in clinic on the day of admission, UA was grossly abnl ( WBCs, 5-10 RBCs, mod leuk esterase and neg nitrites) and Cr increased to 2.8. She was referred to the ED for further evaluation. Afebrile on presentation, hypotensive but BPs stable after IVFs. WBC 20.6 on admission. Admitting MD (Dr. Cox) spoke with patient's rheumatologist, felt presentation unlikely related to vasculitis. Started on IV Rocephin.     Urine culture ultimately grew 10-50k enterococcus. Discussed with nephrology, not inclined to treat given lack of urinary sx. Rocephin dc'd 12/2. Ongoing leukocytosis likely dt increased steroids on admission -- remains afebrile, blood cultures neg. Remains hemodynamically stable.     Acute Renal Failure:  Baseline Cr 1.4 - 1.5. Cr 1.43 on 11/21 -- up to 2.88 on the day of admission. Unclear if prerenal dt dehydration with  UTI. IVFs started on admission. Per rheumatology, low suspicion for flare of vasculitis. Nephrology consulted, no proteinuria, felt clinical picture unlikely dt vasculitis flare.  -- Cr trending down: 2.88 -- 2.56 -- 2.15 today  -- discussed with nephrology -- will dc IVFs    Non AG metabolic acidosis:  Bicarb low in context of improving renal function.   Nephrology following, suspect RTA. May " Management per psychiatry ultimately require bicarb (note she had been on this previously, unclear when/why it was stopped).    Microscopic Polyangiitis  Follows with Dr. Derrick Arnold and typically manages with Imuran and prednisone (5mg daily). Saw her rheumatologist earlier this week after she developed a papular rash, he did not feel this was related to a flare of her vasculitis and told her to stop taking her Imuran. Per discussions with Dr. Arnold on admission, prednisone increased from 5mg daily to 40mg daily. Given 40mg daily on 11/30-12/1.   -- prednisone decreased back to 5mg daily on 12/2  -- discussed with nephrology -- will restart Imuran 50mg daily today  -- patient says she takes Bactrim for PCP ppx (was initially prescribed while on cyclophosphamide earlier this stay) -- discussed with nephrology and curbsided ID -- can resume Bactrim SS i tab every other day    Papular Rash:  Noted on trunk/extremities. Non-pruritic. Saw rheumatologist on 11/27, felt to be unrelated to vasculitis.   -- monitor for now, per patient seems to be improving w/o specific intervention    DM II:  A1C 6.7 in 5/2017. Diet controlled.   Given acute illness and increase in steroids, was placed on SSI on admission    Recent Labs  Lab 12/02/17  0847 12/02/17  0745 12/02/17  0237 12/01/17  1912 12/01/17  1312 12/01/17  0809 12/01/17  0800 12/01/17  0208  11/30/17  1239   GLC  --  131*  --   --   --  134*  --   --   --  101*   *  --  156* 182* 181*  --  144* 185*  < >  --    < > = values in this interval not displayed.    -- cont med dose SSI    Hypothyroidism:  Chronic and stable on levothyroxine (112 mcg daily) and liothyronine (15mcg BID) which have been continued.     GERD:  Chronic and stable on PPI    Normocytic Anemia:  Baseline hgb seems to be around 12, hgb down to 10 this stay in context of IVFs.   -- monitor labs periodically, no addition workup needed at this time    FEN: dc IVFs, lytes stable, regular diet as tolerated  DVT Prophylaxis:  PCDs  Code Status: Full Code    Disposition: Anticipate discharge home tomorrow pending improved renal function and tolerating po.     Shaneka Laguerre    Interval History   Seen this afternoon. Endorsed ongoing cough overnight but otherwise feeling okay. No fever/chills. Eating/drinking okay. Denies abd pain/n/v. No cp/sob. Rash improving without specific intervention..     -Data reviewed today: I reviewed all new labs and imaging results over the last 24 hours. I personally reviewed no images or EKG's today.    Physical Exam   Temp: 97.4  F (36.3  C) Temp src: Oral BP: 123/64 Pulse: 85 Heart Rate: 66 Resp: 16 SpO2: 99 % O2 Device: None (Room air)    Vitals:    12/01/17 0545 12/02/17 0700   Weight: 108.1 kg (238 lb 5.1 oz) 110.9 kg (244 lb 7.8 oz)     Vital Signs with Ranges  Temp:  [97.4  F (36.3  C)] 97.4  F (36.3  C)  Pulse:  [85] 85  Heart Rate:  [66] 66  Resp:  [16-18] 16  BP: (123-137)/(51-68) 123/64  SpO2:  [99 %] 99 %  I/O last 3 completed shifts:  In: 2326.67 [P.O.:240; I.V.:2086.67]  Out: 2950 [Urine:2950]    Constitutional: Resting comfortably, alert and conversing appropriately, NAD  Respiratory: CTAB, no wheeze/rales/rhonchi, no increased work of breathing  Cardiovascular: HRRR, no MGR, +bilateal LE edema to mid shins  GI: S, NT, ND, +BS  Skin/Integumen: warm/dry, few scattered papular lesions on extremities which have crusted over, no new lesions  Other:      Medications     NaCl 100 mL/hr at 12/02/17 0137       predniSONE  5 mg Oral Daily     cholecalciferol (vitamin D3) tablet 1,000 Units  1,000 Units Oral Daily     levothyroxine  112 mcg Oral QAM     liothyronine  15 mcg Oral BID     omeprazole  40 mg Oral QAM     insulin aspart  1-7 Units Subcutaneous TID AC     insulin aspart  1-5 Units Subcutaneous At Bedtime     guaiFENesin  600 mg Oral BID     cefTRIAXone  1 g Intravenous Q24H       Data     Recent Labs  Lab 12/02/17  0745 12/01/17  0809 11/30/17  1239 11/30/17  1129   WBC 20.1* 17.7*   --  20.6*   HGB 9.5* 10.7*  --  10.4*   MCV 90 91  --  94   * 508*  --  512*    131* 133  --    POTASSIUM 4.7 4.6 4.5  --    CHLORIDE 105 101 100  --    CO2 19* 19* 23  --    BUN 41* 35* 36*  --    CR 2.15* 2.56* 2.88*  --    ANIONGAP 10 11 10  --    ARABELLA 8.8 8.9 9.0  --    * 134* 101*  --    ALBUMIN  --   --  2.5*  --    PROTTOTAL  --   --  6.8  --    BILITOTAL  --   --  0.3  --    ALKPHOS  --   --  113  --    ALT  --   --  18  --    AST  --   --  14  --        No results found for this or any previous visit (from the past 24 hour(s)).

## 2023-05-02 NOTE — PROGRESS NOTES
Assessment/Plan:    Take medications as directed  RTO 2 weeks for lab review  1  Acute bronchitis, unspecified organism  -     albuterol (PROVENTIL HFA,VENTOLIN HFA) 90 mcg/act inhaler; Inhale 1-2 puffs as needed for wheezing  -     azithromycin (ZITHROMAX) 250 mg tablet; 2 tabs PO day 1, then 1 tab PO days 2-5  -     benzonatate (TESSALON) 200 MG capsule; Take 1 capsule (200 mg total) by mouth 3 (three) times a day as needed for cough    2  Type 2 diabetes mellitus with hyperglycemia, without long-term current use of insulin ([facility])    3  Mood disorder [facility])  Assessment & Plan:  Management per psychiatry               There are no Patient Instructions on file for this visit  Return in about 2 weeks (around 5/16/2023)  Subjective:      Patient ID: Tiffanie Nelson is a 26 y o  female  Chief Complaint   Patient presents with    Headache    Sinus Problem    Sore Throat    Cough     NP  Started 4 days ago [email or web address]       Here today with complaints of a cough  She is having a hard time breathing and it is very productive  Has been ongoing for the past 4 days  She does suffer from seasonal allergies  She does have wheezing, no significant SOB  No fevers/chills  Sinuses are still congested  Tried OTC antihistamine and cold/allergy medications  Was diagnosed with DM 2 years ago, she did see endo at the time, however   She was on Rexulti in the past, thought elevated sugars may have been related  She has lost approx 40 pounds since then  The following portions of the patient's history were reviewed and updated as appropriate: allergies, current medications, past family history, past medical history, past social history, past surgical history and problem list     Review of Systems   Constitutional: Positive for fatigue  Negative for chills and fever  HENT: Positive for congestion, sinus pressure and sore throat  Negative for ear pain, postnasal drip and rhinorrhea      Respiratory: "Positive for cough, chest tightness and shortness of breath  Negative for wheezing  Cardiovascular: Negative for chest pain  Gastrointestinal: Negative for abdominal pain, diarrhea, nausea and vomiting  Musculoskeletal: Negative for arthralgias  Skin: Negative for rash  Neurological: Negative for headaches  Current Outpatient Medications   Medication Sig Dispense Refill    albuterol (PROVENTIL HFA,VENTOLIN HFA) 90 mcg/act inhaler Inhale 1-2 puffs as needed for wheezing 18 g 0    ALPRAZolam (XANAX) 0 5 mg tablet Take 1 tablet (0 5 mg total) by mouth 2 (two) times a day as needed for anxiety 60 tablet 0    azithromycin (ZITHROMAX) 250 mg tablet 2 tabs PO day 1, then 1 tab PO days 2-5 6 tablet 0    benzonatate (TESSALON) 200 MG capsule Take 1 capsule (200 mg total) by mouth 3 (three) times a day as needed for cough 20 capsule 0    Blood Glucose Monitoring Suppl (OneTouch Verio) w/Device KIT Use to test blood sugar three times a day 1 kit 0    DULoxetine (CYMBALTA) 60 mg delayed release capsule TAKE ONE CAPSULE BY MOUTH TWICE A DAY 60 capsule 1    esomeprazole (NexIUM) 20 mg capsule TAKE ONE CAPSULE BY MOUTH EVERY DAY IN THE EARLY MORNING 30 capsule 9    glucose blood (OneTouch Verio) test strip Test blood sugar three times a day 300 each 3    lamoTRIgine (LaMICtal) 150 MG tablet Take 1 tablet (150 mg total) by mouth daily 30 tablet 3    Lancets (onetouch ultrasoft) lancets Test blood sugar three times a day 300 each 3    norgestimate-ethinyl estradiol (ORTHO-CYCLEN) 0 25-35 MG-MCG per tablet Take 1 tablet daily, skip placebo pills, continuous contraceptive 84 tablet 4     No current facility-administered medications for this visit  Objective:    /74   Pulse 100   Temp (!) 96 6 °F (35 9 °C)   Resp 16   Ht 5' 4 75\" (1 645 m)   Wt 112 kg (247 lb)   LMP 04/14/2023 (Exact Date)   BMI 41 42 kg/m²        Physical Exam  Vitals and nursing note reviewed     Constitutional:       " Appearance: Normal appearance  She is well-developed  HENT:      Right Ear: Tympanic membrane normal       Left Ear: Tympanic membrane normal       Nose: Congestion present  Mouth/Throat:      Pharynx: Oropharynx is clear  Eyes:      Conjunctiva/sclera: Conjunctivae normal    Cardiovascular:      Rate and Rhythm: Normal rate and regular rhythm  Pulses: Normal pulses  Heart sounds: Normal heart sounds  No murmur heard  Pulmonary:      Effort: Pulmonary effort is normal       Breath sounds: Normal breath sounds  Lymphadenopathy:      Cervical: No cervical adenopathy  Skin:     General: Skin is warm and dry  Neurological:      Mental Status: She is alert     Psychiatric:         Mood and Affect: Mood normal          Behavior: Behavior normal                 RAOUL Ho

## 2023-06-12 DIAGNOSIS — F41.1 GAD (GENERALIZED ANXIETY DISORDER): ICD-10-CM

## 2023-06-12 RX ORDER — ALPRAZOLAM 0.5 MG/1
TABLET ORAL
Qty: 60 TABLET | Refills: 0 | Status: SHIPPED | OUTPATIENT
Start: 2023-06-12

## 2023-06-24 DIAGNOSIS — F41.1 GENERALIZED ANXIETY DISORDER: ICD-10-CM

## 2023-06-24 DIAGNOSIS — F33.1 MODERATE EPISODE OF RECURRENT MAJOR DEPRESSIVE DISORDER (HCC): ICD-10-CM

## 2023-06-24 RX ORDER — DULOXETIN HYDROCHLORIDE 60 MG/1
CAPSULE, DELAYED RELEASE ORAL
Qty: 60 CAPSULE | Refills: 1 | Status: SHIPPED | OUTPATIENT
Start: 2023-06-24

## 2023-08-07 ENCOUNTER — OFFICE VISIT (OUTPATIENT)
Dept: PSYCHIATRY | Facility: CLINIC | Age: 23
End: 2023-08-07
Payer: COMMERCIAL

## 2023-08-07 VITALS — WEIGHT: 239.6 LBS | HEIGHT: 65 IN | BODY MASS INDEX: 39.92 KG/M2

## 2023-08-07 DIAGNOSIS — F33.1 MODERATE EPISODE OF RECURRENT MAJOR DEPRESSIVE DISORDER (HCC): ICD-10-CM

## 2023-08-07 DIAGNOSIS — F39 MOOD DISORDER (HCC): Primary | ICD-10-CM

## 2023-08-07 DIAGNOSIS — F41.1 GAD (GENERALIZED ANXIETY DISORDER): ICD-10-CM

## 2023-08-07 DIAGNOSIS — F41.1 GENERALIZED ANXIETY DISORDER: ICD-10-CM

## 2023-08-07 DIAGNOSIS — R53.83 OTHER FATIGUE: ICD-10-CM

## 2023-08-07 PROCEDURE — 90833 PSYTX W PT W E/M 30 MIN: CPT | Performed by: NURSE PRACTITIONER

## 2023-08-07 PROCEDURE — 99214 OFFICE O/P EST MOD 30 MIN: CPT | Performed by: NURSE PRACTITIONER

## 2023-08-07 RX ORDER — ALPRAZOLAM 0.5 MG/1
0.5 TABLET ORAL 2 TIMES DAILY PRN
Qty: 60 TABLET | Refills: 0 | Status: SHIPPED | OUTPATIENT
Start: 2023-08-07

## 2023-08-07 RX ORDER — LAMOTRIGINE 150 MG/1
150 TABLET ORAL DAILY
Qty: 30 TABLET | Refills: 3 | Status: SHIPPED | OUTPATIENT
Start: 2023-08-07

## 2023-08-07 RX ORDER — DULOXETIN HYDROCHLORIDE 60 MG/1
60 CAPSULE, DELAYED RELEASE ORAL 2 TIMES DAILY
Qty: 60 CAPSULE | Refills: 3 | Status: SHIPPED | OUTPATIENT
Start: 2023-08-07

## 2023-08-07 NOTE — BH CRISIS PLAN
Client Name: Becca Don       Client YOB: 2000  : 2000    Treatment Team (include name and contact information):     Psychotherapist: in past, [facility]    Psychiatrist: undersigned      Healthcare Provider  Porfirio Olguin, [address]      Type of Plan   * Child plans (children 15 yo and younger) must be completed and signed by the child's legal guardian   * Plans for all individuals 15 yo and above must be signed by the client. Plan Type: adolescent/adult (15 and over) Initial      My Personal Strengths are (in the client's own words):  "Empathetic, good with people"  The stressors and triggers that may put me at risk are:  anger, fighting with someone, show it as sad, comment on her looks, out in public with a lot of  people. Coping skills I can use to keep myself calm and safe: Other (describe) talk to friends or parents, back into drawing and writing, hair and makeup    Coping skills/supports I can use to maintain abstinence from substance use:   Not Applicable    The people that provide me with help and support: (Include name, contact, and how they can help)   Support person #1: mother    * Phone number: in cell phone    * How can they help me? Support talk   Support person #2:grandmother    * Phone number: in cell phone    * How can they help me? Support talk     Support person #3: Dad    * Phone number: in cell phone    * How can they help me?  Support, talk    In the past, the following has helped me in times of crisis:    Other: shut out, isolate, over eating and stay in room      If it is an emergency and you need immediate help, call     If there is a possibility of danger to yourself or others, call the following crisis hotline resources:     Adult Crisis Numbers  Suicide Prevention Hotline - Dial   [facility]: [address]: [address]: [address]: [phone]  [place]: [phone]  [facility]: [address]: [address]: [phone] (daytime). [phone] (after hours, weekends, holidays)     Child/Adolescent Crisis Numbers   [facility]OM[facility] AND [facility]ILDREN[facility]S [facility] of hospitals in the [place]: [address]: [phone]   [facility]: [phone]   [facility]: [phone]    Please note: Some [facility] do not have a separate number for Child/Adolescent specific crisis. If your county is not listed under Child/Adolescent, please call the adult number for your county     National Talk to Text Line   All Ages - [phone]    In the event your feelings become unmanageable, and you cannot reach your support system, you will call 911 immediately or go to the nearest hospital emergency room.

## 2023-08-07 NOTE — PSYCH
Visit Time    Visit Start Time: 11:30 AM  Visit Stop Time: 12:00 PM  Total Visit Duration: 30 minutes    Subjective:     Patient ID: Denver Karma is a 26 y.o. female she has a history of anxiety and depression seen for medication management and mood assessment. Liana Lacy reports that her anxiety and depression are "okay" denies suicidal ideation however has occasional urge for self-harm several days out of of a 2-week interval.  She denies self-harm and is coping. She reports less THC vaping, she is social with her friends and family are supportive. She had an argument with her parents over cleaning her room and slammed her bedroom door injuring her right hand. Myriam plans to attend 4 classes this fall semester and she will graduate with an associate's degree in [place]. She has no further plans at this time; however, may consider a degree in English when transferring to a 4 year of college. She reports stress at home uncle was just diagnosed with stage I cirrhosis of the liver(he doesn't drink), grandfather has had prostate cancer and now they believe it spread to his colon and stomach, and grandmother hurt her neck and was in the ER. She is coping with the stressors, and reports taking Xanax rarely. Takes medication as prescribed appetite is good she is trying to eat less, and she is sleeping. LUZMARIA-sevens score of 8 indicates mild anxiety, PHQ-9 score of 13 indicates moderate depression with several days out of 14-days has thoughts to self harm, but does not.   HPI ROS Appetite Changes and Sleep: normal appetite and normal energy level    Review Of Systems:     Mood Anxiety and Depression   Behavior Normal    Thought Content Normal   General Emotional Problems   Personality Normal   Other Psych Symptoms Normal   Constitutional As Noted in HPI   ENT As Noted in HPI   Cardiovascular As Noted in HPI   Respiratory As Noted in HPI   Gastrointestinal As Noted in HPI   Genitourinary As Noted in HPI Musculoskeletal right hand swollen and painful   Integumentary As Noted in HPI   Neurological As Noted in HPI   Endocrine Normal    Other Symptoms Normal        Substance Abuse History:  Social History     Substance and Sexual Activity   Drug Use Yes   • Types: Marijuana       Family Psychiatric History:   Family History   Problem Relation Age of Onset   • Anxiety disorder Mother    • Depression Mother    • Asthma Mother    • Crohn's disease Mother    • Bipolar disorder Mother    • Cervical cancer Mother    • Depression Maternal Aunt    • Anxiety disorder Maternal Aunt    • Anxiety disorder Maternal Uncle    • Depression Maternal Grandfather    • Hypertension Maternal Grandmother    • Alcohol abuse Paternal Grandfather    • Breast cancer Family        The following portions of the patient's history were reviewed and updated as appropriate: allergies, current medications, past family history, past medical history, past social history, past surgical history and problem list.    Social History     Socioeconomic History   • Marital status: Single     Spouse name: Not on file   • Number of children: 0   • Years of education: Not on file   • Highest education level: Some college, no degree   Occupational History   • Not on file   Tobacco Use   • Smoking status: Never     Passive exposure: Past   • Smokeless tobacco: Never   • Tobacco comments:     vape daily   Vaping Use   • Vaping Use: Every day   • Substances: Nicotine, Flavoring   Substance and Sexual Activity   • Alcohol use:  Yes     Alcohol/week: 2.0 standard drinks of alcohol     Types: 2 Shots of liquor per week     Comment: socially   • Drug use: Yes     Types: Marijuana   • Sexual activity: Yes     Partners: Male   Other Topics Concern   • Not on file   Social History Narrative    12th grade     Social Determinants of Health     Financial Resource Strain: Not on file   Food Insecurity: Not on file   Transportation Needs: Not on file   Physical Activity: Not on file   Stress: Not on file   Social Connections: Not on file   Intimate Partner Violence: Not on file   Housing Stability: Not on file     Social History     Social History Narrative    12th grade       Objective:       Mental status:  Appearance calm and cooperative , adequate hygiene and grooming and good eye contact    Mood anxious   Affect affect appropriate    Speech a normal rate   Thought Processes normal thought processes   Hallucinations no hallucinations present    Thought Content no delusions   Abnormal Thoughts no suicidal thoughts  and no homicidal thoughts    Orientation  oriented to person and place and time   Remote Memory short term memory intact and long term memory intact   Attention Span concentration intact   Intellect Appears to be of Average Intelligence   Insight Insight intact   Judgement judgment was intact   Muscle Strength Muscle strength and tone were normal   Language no difficulty naming common objects   Fund of Knowledge displays adequate knowledge of current events   Pain moderate to severe   Pain Scale 6       Assessment/Plan:       Diagnoses and all orders for this visit:    Mood disorder (HCC)    Moderate episode of recurrent major depressive disorder ([address])            Treatment Recommendations- Risks Benefits      Immediate Medical/Psychiatric/Psychotherapy Treatments and Any Precautions:  reviewed medication continue with treatment plan, discussed options to add an augmenting medication to the Cymbalta she decided to wait. Encouraged to go to urgent care to have her hand evaluated. Risks, Benefits And Possible Side Effects Of Medications:  {PSYCH RISK, BENEFITS AND POSSIBLE SIDE EFFECTS (Optional):53938    Controlled Medication Discussion: She is aware of safe use and storage of medication     Psychotherapy Provided: 30 minutes individual psychotherapy provided.   Supportive therapy  Medication evaluation  Mood assessment  Encouraged medical treatment for hand    Goals discussed in session: Maintain stable mood    Treatment plan updated verbal agreement obtained; crisis plan developed

## 2023-08-08 NOTE — BH TREATMENT PLAN
TREATMENT PLAN (Medication Management Only)         [address] [facility]     Name and Date of Louis bennett 2000  Date of Treatment Plan: July 6, 2020, updated 10/5/2020, 5/10/2021, 11/9/2021, 5/23/2022, 1/16/2023, 8/7/2023  Diagnosis/Diagnoses:    1. Persistent mood (affective) disorder, unspecified ([address])    2. Anxiety disorder, unspecified type       Strengths/Personal Resources for Self-Care: supportive family, supportive friends, taking medications as prescribed. Area/Areas of need (in own words): anxiety, depression  1.         Long Term Goal: maintain mood stability. Target Date: 6 months - 2/7/2024  Person/Persons responsible for completion of goal: Myriam  2.         Short Term Objective (s) - How will we reach this goal?:   A. Provider new recommended medication/dosage changes and/or continue medication(s): continue current medications as prescribed. B. Reduce vaping. C. take medication as prescribed, therapy. Target Date: 6 months - 2/7/2024  Person/Persons Responsible for Completion of Goal: Myriam  Progress Towards Goals: continuing treatment  Treatment Modality: medication management every 3 months  Review due 180 days from date of this plan: 6 months -2/7/2024    Expected length of service: ongoing treatment  My Physician/PA/NP and I have developed this plan together and I agree to work on the goals and objectives.  I understand the treatment goals that were developed for my treatment

## 2023-09-14 DIAGNOSIS — F41.1 GAD (GENERALIZED ANXIETY DISORDER): ICD-10-CM

## 2023-09-14 RX ORDER — ALPRAZOLAM 0.5 MG/1
TABLET ORAL
Qty: 60 TABLET | Refills: 0 | Status: SHIPPED | OUTPATIENT
Start: 2023-09-14

## 2023-09-29 DIAGNOSIS — F41.1 GENERALIZED ANXIETY DISORDER: ICD-10-CM

## 2023-09-29 DIAGNOSIS — F33.1 MODERATE EPISODE OF RECURRENT MAJOR DEPRESSIVE DISORDER (HCC): ICD-10-CM

## 2023-09-29 RX ORDER — DULOXETIN HYDROCHLORIDE 60 MG/1
60 CAPSULE, DELAYED RELEASE ORAL 2 TIMES DAILY
Qty: 60 CAPSULE | Refills: 1 | Status: SHIPPED | OUTPATIENT
Start: 2023-09-29

## 2023-10-18 DIAGNOSIS — F41.1 GAD (GENERALIZED ANXIETY DISORDER): ICD-10-CM

## 2023-10-19 RX ORDER — ALPRAZOLAM 0.5 MG/1
TABLET ORAL
Qty: 60 TABLET | Refills: 0 | Status: SHIPPED | OUTPATIENT
Start: 2023-10-19

## 2023-11-17 DIAGNOSIS — F41.1 GAD (GENERALIZED ANXIETY DISORDER): ICD-10-CM

## 2023-11-18 RX ORDER — ALPRAZOLAM 0.5 MG/1
TABLET ORAL
Qty: 60 TABLET | Refills: 0 | Status: SHIPPED | OUTPATIENT
Start: 2023-11-18

## 2023-12-30 DIAGNOSIS — F33.1 MODERATE EPISODE OF RECURRENT MAJOR DEPRESSIVE DISORDER (HCC): ICD-10-CM

## 2023-12-30 DIAGNOSIS — F41.1 GENERALIZED ANXIETY DISORDER: ICD-10-CM

## 2023-12-30 RX ORDER — DULOXETIN HYDROCHLORIDE 60 MG/1
60 CAPSULE, DELAYED RELEASE ORAL 2 TIMES DAILY
Qty: 60 CAPSULE | Refills: 1 | Status: SHIPPED | OUTPATIENT
Start: 2023-12-30

## 2024-01-03 DIAGNOSIS — F41.1 GAD (GENERALIZED ANXIETY DISORDER): ICD-10-CM

## 2024-01-03 DIAGNOSIS — K21.9 GASTROESOPHAGEAL REFLUX DISEASE, UNSPECIFIED WHETHER ESOPHAGITIS PRESENT: Primary | ICD-10-CM

## 2024-01-03 DIAGNOSIS — K21.9 GASTROESOPHAGEAL REFLUX DISEASE, UNSPECIFIED WHETHER ESOPHAGITIS PRESENT: ICD-10-CM

## 2024-01-03 RX ORDER — ALPRAZOLAM 0.5 MG/1
TABLET ORAL
Qty: 60 TABLET | Refills: 0 | Status: SHIPPED | OUTPATIENT
Start: 2024-01-03

## 2024-01-03 NOTE — TELEPHONE ENCOUNTER
Patients GI provider:  ROMANA Gibbs    Number to return call: [phone]    Reason for call: Pt calling asking if the courtesy refill for Nexium has been sent to her pharmacy on her chart. Pt has scheduled her ov.    Scheduled procedure/appointment date if applicable: Apt 2/12/24

## 2024-01-06 DIAGNOSIS — N92.0 MENORRHAGIA WITH REGULAR CYCLE: ICD-10-CM

## 2024-01-06 DIAGNOSIS — N94.6 DYSMENORRHEA IN ADOLESCENT: ICD-10-CM

## 2024-01-07 RX ORDER — NORGESTIMATE AND ETHINYL ESTRADIOL 0.25-0.035
KIT ORAL
Qty: 84 TABLET | Refills: 0 | Status: SHIPPED | OUTPATIENT
Start: 2024-01-07

## 2024-01-07 NOTE — TELEPHONE ENCOUNTER
Temporary OCP refill provided. Please call patient to schedule APE. (Last seen 10/2022).     Gilma Ibarra MD  OB/GYN  She/her  1/7/2024  3:15 PM

## 2024-01-29 DIAGNOSIS — F33.1 MODERATE EPISODE OF RECURRENT MAJOR DEPRESSIVE DISORDER (HCC): ICD-10-CM

## 2024-01-29 DIAGNOSIS — F41.1 GENERALIZED ANXIETY DISORDER: ICD-10-CM

## 2024-01-29 RX ORDER — LAMOTRIGINE 150 MG/1
150 TABLET ORAL DAILY
Qty: 30 TABLET | Refills: 3 | Status: SHIPPED | OUTPATIENT
Start: 2024-01-29

## 2024-02-04 ENCOUNTER — HOSPITAL ENCOUNTER (EMERGENCY)
Facility: HOSPITAL | Age: 24
Discharge: HOME | End: 2024-02-05
Attending: EMERGENCY MEDICINE
Payer: COMMERCIAL

## 2024-02-04 DIAGNOSIS — S20.411A ABRASION OF RIGHT SIDE OF BACK, INITIAL ENCOUNTER: ICD-10-CM

## 2024-02-04 DIAGNOSIS — R55 SYNCOPE: Primary | ICD-10-CM

## 2024-02-04 DIAGNOSIS — M54.30 SCIATICA: ICD-10-CM

## 2024-02-04 LAB
BASOPHILS # BLD AUTO: 0.08 THOUSANDS/ÂΜL (ref 0–0.1)
BASOPHILS NFR BLD AUTO: 1 % (ref 0–1)
EOSINOPHIL # BLD AUTO: 0.55 THOUSAND/ÂΜL (ref 0–0.61)
EOSINOPHIL NFR BLD AUTO: 6 % (ref 0–6)
ERYTHROCYTE [DISTWIDTH] IN BLOOD BY AUTOMATED COUNT: 17.2 % (ref 11.6–15.1)
GLUCOSE SERPL-MCNC: 169 MG/DL (ref 65–140)
HCT VFR BLD AUTO: 37.1 % (ref 34.8–46.1)
HGB BLD-MCNC: 11.5 G/DL (ref 11.5–15.4)
IMM GRANULOCYTES # BLD AUTO: 0.02 THOUSAND/UL (ref 0–0.2)
IMM GRANULOCYTES NFR BLD AUTO: 0 % (ref 0–2)
LYMPHOCYTES # BLD AUTO: 2.29 THOUSANDS/ÂΜL (ref 0.6–4.47)
LYMPHOCYTES NFR BLD AUTO: 25 % (ref 14–44)
MCH RBC QN AUTO: 24.2 PG (ref 26.8–34.3)
MCHC RBC AUTO-ENTMCNC: 31 G/DL (ref 31.4–37.4)
MCV RBC AUTO: 78 FL (ref 82–98)
MONOCYTES # BLD AUTO: 0.59 THOUSAND/ÂΜL (ref 0.17–1.22)
MONOCYTES NFR BLD AUTO: 6 % (ref 4–12)
NEUTROPHILS # BLD AUTO: 5.82 THOUSANDS/ÂΜL (ref 1.85–7.62)
NEUTS SEG NFR BLD AUTO: 62 % (ref 43–75)
NRBC BLD AUTO-RTO: 0 /100 WBCS
PLATELET # BLD AUTO: 365 THOUSANDS/UL (ref 149–390)
PMV BLD AUTO: 10.3 FL (ref 8.9–12.7)
RBC # BLD AUTO: 4.75 MILLION/UL (ref 3.81–5.12)
WBC # BLD AUTO: 9.35 THOUSAND/UL (ref 4.31–10.16)

## 2024-02-04 PROCEDURE — 99284 EMERGENCY DEPT VISIT MOD MDM: CPT

## 2024-02-04 PROCEDURE — 36415 COLL VENOUS BLD VENIPUNCTURE: CPT | Performed by: EMERGENCY MEDICINE

## 2024-02-04 PROCEDURE — 93005 ELECTROCARDIOGRAM TRACING: CPT

## 2024-02-04 PROCEDURE — 81025 URINE PREGNANCY TEST: CPT | Performed by: EMERGENCY MEDICINE

## 2024-02-04 PROCEDURE — 85025 COMPLETE CBC W/AUTO DIFF WBC: CPT | Performed by: EMERGENCY MEDICINE

## 2024-02-04 PROCEDURE — 99285 EMERGENCY DEPT VISIT HI MDM: CPT | Performed by: EMERGENCY MEDICINE

## 2024-02-04 PROCEDURE — 82948 REAGENT STRIP/BLOOD GLUCOSE: CPT

## 2024-02-04 PROCEDURE — 96360 HYDRATION IV INFUSION INIT: CPT

## 2024-02-04 PROCEDURE — 80053 COMPREHEN METABOLIC PANEL: CPT | Performed by: EMERGENCY MEDICINE

## 2024-02-04 RX ADMIN — SODIUM CHLORIDE 1000 ML: 0.9 INJECTION, SOLUTION INTRAVENOUS at 23:58

## 2024-02-05 ENCOUNTER — APPOINTMENT (EMERGENCY)
Dept: RADIOLOGY | Facility: HOSPITAL | Age: 24
End: 2024-02-05
Payer: COMMERCIAL

## 2024-02-05 VITALS
SYSTOLIC BLOOD PRESSURE: 132 MMHG | RESPIRATION RATE: 18 BRPM | DIASTOLIC BLOOD PRESSURE: 73 MMHG | HEART RATE: 79 BPM | TEMPERATURE: 98.3 F | OXYGEN SATURATION: 100 %

## 2024-02-05 LAB
ALBUMIN SERPL BCP-MCNC: 4 G/DL (ref 3.5–5)
ALP SERPL-CCNC: 52 U/L (ref 34–104)
ALT SERPL W P-5'-P-CCNC: 18 U/L (ref 7–52)
ANION GAP SERPL CALCULATED.3IONS-SCNC: 7 MMOL/L
AST SERPL W P-5'-P-CCNC: 30 U/L (ref 13–39)
ATRIAL RATE: 75 BPM
BACTERIA UR QL AUTO: ABNORMAL /HPF
BILIRUB SERPL-MCNC: 0.34 MG/DL (ref 0.2–1)
BILIRUB UR QL STRIP: NEGATIVE
BUN SERPL-MCNC: 15 MG/DL (ref 5–25)
CALCIUM SERPL-MCNC: 9 MG/DL (ref 8.4–10.2)
CHLORIDE SERPL-SCNC: 104 MMOL/L (ref 96–108)
CLARITY UR: CLEAR
CO2 SERPL-SCNC: 23 MMOL/L (ref 21–32)
COLOR UR: YELLOW
CREAT SERPL-MCNC: 0.79 MG/DL (ref 0.6–1.3)
EXT PREGNANCY TEST URINE: NEGATIVE
EXT. CONTROL: NORMAL
GFR SERPL CREATININE-BSD FRML MDRD: 107 ML/MIN/1.73SQ M
GLUCOSE SERPL-MCNC: 117 MG/DL (ref 65–140)
GLUCOSE UR STRIP-MCNC: NEGATIVE MG/DL
HGB UR QL STRIP.AUTO: ABNORMAL
HYALINE CASTS #/AREA URNS LPF: ABNORMAL /LPF
KETONES UR STRIP-MCNC: NEGATIVE MG/DL
LEUKOCYTE ESTERASE UR QL STRIP: NEGATIVE
MUCOUS THREADS UR QL AUTO: ABNORMAL
NITRITE UR QL STRIP: NEGATIVE
NON-SQ EPI CELLS URNS QL MICRO: ABNORMAL /HPF
P AXIS: 33 DEGREES
PH UR STRIP.AUTO: 6.5 [PH]
POTASSIUM SERPL-SCNC: 4.7 MMOL/L (ref 3.5–5.3)
PR INTERVAL: 190 MS
PROT SERPL-MCNC: 6.7 G/DL (ref 6.4–8.4)
PROT UR STRIP-MCNC: NEGATIVE MG/DL
QRS AXIS: 90 DEGREES
QRSD INTERVAL: 86 MS
QT INTERVAL: 376 MS
QTC INTERVAL: 419 MS
RBC #/AREA URNS AUTO: ABNORMAL /HPF
SODIUM SERPL-SCNC: 134 MMOL/L (ref 135–147)
SP GR UR STRIP.AUTO: 1.01 (ref 1–1.03)
T WAVE AXIS: 56 DEGREES
UROBILINOGEN UR QL STRIP.AUTO: 0.2 E.U./DL
VENTRICULAR RATE: 75 BPM
WBC #/AREA URNS AUTO: ABNORMAL /HPF

## 2024-02-05 PROCEDURE — 71101 X-RAY EXAM UNILAT RIBS/CHEST: CPT

## 2024-02-05 PROCEDURE — 72100 X-RAY EXAM L-S SPINE 2/3 VWS: CPT

## 2024-02-05 PROCEDURE — 81001 URINALYSIS AUTO W/SCOPE: CPT | Performed by: EMERGENCY MEDICINE

## 2024-02-05 RX ORDER — PREDNISONE 20 MG/1
40 TABLET ORAL ONCE
Status: COMPLETED | OUTPATIENT
Start: 2024-02-05 | End: 2024-02-05

## 2024-02-05 RX ORDER — CYCLOBENZAPRINE HCL 10 MG
10 TABLET ORAL 2 TIMES DAILY PRN
Qty: 10 TABLET | Refills: 0 | Status: SHIPPED | OUTPATIENT
Start: 2024-02-05

## 2024-02-05 RX ORDER — PREDNISONE 10 MG/1
TABLET ORAL
Qty: 20 TABLET | Refills: 0 | Status: SHIPPED | OUTPATIENT
Start: 2024-02-05

## 2024-02-05 RX ORDER — CYCLOBENZAPRINE HCL 10 MG
10 TABLET ORAL ONCE
Status: COMPLETED | OUTPATIENT
Start: 2024-02-05 | End: 2024-02-05

## 2024-02-05 RX ORDER — NAPROXEN 500 MG/1
500 TABLET ORAL ONCE
Status: COMPLETED | OUTPATIENT
Start: 2024-02-05 | End: 2024-02-05

## 2024-02-05 RX ORDER — GINSENG 100 MG
1 CAPSULE ORAL ONCE
Status: COMPLETED | OUTPATIENT
Start: 2024-02-05 | End: 2024-02-05

## 2024-02-05 RX ADMIN — CYCLOBENZAPRINE HYDROCHLORIDE 10 MG: 10 TABLET, FILM COATED ORAL at 01:06

## 2024-02-05 RX ADMIN — NAPROXEN 500 MG: 500 TABLET ORAL at 01:06

## 2024-02-05 RX ADMIN — PREDNISONE 40 MG: 20 TABLET ORAL at 01:06

## 2024-02-05 RX ADMIN — BACITRACIN ZINC 1 LARGE APPLICATION: 500 OINTMENT TOPICAL at 00:59

## 2024-02-05 NOTE — DISCHARGE INSTRUCTIONS
Your blood tests, xrays and EKG are ok at this time.  Try the medicines as prescribed and follow up with your doctor and the cardiologist.

## 2024-02-05 NOTE — ED PROVIDER NOTES
History  Chief Complaint   Patient presents with    Syncope     Pt states has been having lower back pain for couple weeks, while brushing teeth was bending over d/t pain. Family heard bang and found pt unconscious, pt proceeded to have 3 more syncopal events. Pt denies head strike but still reports lower back pain 5/10, on menstrual period but denies any previous syncopal events while on period       22 yo female was leaning forward to look in mirror to floss teeth and suddenly broke out in a cold sweat and got dizzy.  She woke up on the floor with family around her.   EMS says family says she passed out 3 more times.  Pt. C/o pain right upper back where she scraped her back on the counter.  She denies headache, neck pain, nausea, vomiting, diarrhea, fever, cough, chest pain or sob.  She did have an episode of fainting years ago which she thinks was from being overheated.  She also has been having right sided low back pain radiating down right leg for the past 2 weeks.  Note: parents here now and say they did try to sit her up and that's when she passed out again.  Mom says she has passed out several times before from pain response.      History provided by:  Patient   used: No    Syncope  Associated symptoms: no chest pain, no dizziness, no fever, no headaches, no nausea, no shortness of breath and no vomiting        Prior to Admission Medications   Prescriptions Last Dose Informant Patient Reported? Taking?   ALPRAZolam (XANAX) 0.5 mg tablet   No No   Sig: TAKE ONE TABLET BY MOUTH TWICE A DAY AS NEEDED FOR ANXIETY   Blood Glucose Monitoring Suppl (OneTouch Verio) w/Device KIT  Self No No   Sig: Use to test blood sugar three times a day   DULoxetine (CYMBALTA) 60 mg delayed release capsule   No No   Sig: TAKE ONE CAPSULE BY MOUTH TWICE A DAY   Lancets (onetouch ultrasoft) lancets  Self No No   Sig: Test blood sugar three times a day   albuterol (PROVENTIL HFA,VENTOLIN HFA) 90 mcg/act inhaler    No No   Sig: Inhale 1-2 puffs as needed for wheezing   benzonatate (TESSALON) 200 MG capsule   No No   Sig: Take 1 capsule (200 mg total) by mouth 3 (three) times a day as needed for cough   esomeprazole (NexIUM) 20 mg capsule   No No   Sig: TAKE ONE CAPSULE BY MOUTH EVERY DAY IN THE EARLY MORNING   esomeprazole (NexIUM) 20 mg capsule   No No   Sig: Take 1 capsule (20 mg total) by mouth daily in the early morning   glucose blood (OneTouch Verio) test strip  Self No No   Sig: Test blood sugar three times a day   lamoTRIgine (LaMICtal) 150 MG tablet   No No   Sig: TAKE ONE TABLET BY MOUTH EVERY DAY   norgestimate-ethinyl estradiol (ORTHO-CYCLEN) 0.25-35 MG-MCG per tablet   No No   Sig: TAKE ONE TABLET BY MOUTH EVERY DAY (SKIP PLACEBO PILLS, CONTINUOUS CONTRACEPTIVE)      Facility-Administered Medications: None       Past Medical History:   Diagnosis Date    Anxiety     Asthma     Depression     Head injury     High triglycerides     total triglyceride 187 repeat normal 70    Psychiatric disorder        Past Surgical History:   Procedure Laterality Date    KNEE ARTHROSCOPY Right 2015    KNEE ARTHROSCOPY Left 2013    KNEE ARTHROSCOPY Left 2017    KNEE SURGERY      UPPER GASTROINTESTINAL ENDOSCOPY         Family History   Problem Relation Age of Onset    Anxiety disorder Mother     Depression Mother     Asthma Mother     Crohn's disease Mother     Bipolar disorder Mother     Cervical cancer Mother     Depression Maternal Aunt     Anxiety disorder Maternal Aunt     Anxiety disorder Maternal Uncle     Depression Maternal Grandfather     Hypertension Maternal Grandmother     Alcohol abuse Paternal Grandfather     Breast cancer Family      I have reviewed and agree with the history as documented.    E-Cigarette/Vaping    E-Cigarette Use Current Every Day User      E-Cigarette/Vaping Substances    Nicotine Yes     THC Yes     CBD No     Flavoring Yes     Other No     Unknown No      Social History     Tobacco Use    Smoking  status: Never     Passive exposure: Past    Smokeless tobacco: Never    Tobacco comments:     vape daily   Vaping Use    Vaping status: Every Day    Substances: Nicotine, THC, Flavoring   Substance Use Topics    Alcohol use: Yes     Alcohol/week: 2.0 standard drinks of alcohol     Types: 2 Shots of liquor per week     Comment: socially    Drug use: Yes     Types: Marijuana       Review of Systems   Constitutional: Negative.  Negative for fever.   HENT: Negative.     Eyes: Negative.    Respiratory: Negative.  Negative for cough and shortness of breath.    Cardiovascular:  Positive for syncope. Negative for chest pain.   Gastrointestinal: Negative.  Negative for abdominal pain, diarrhea, nausea and vomiting.   Genitourinary: Negative.  Negative for dysuria and flank pain.   Musculoskeletal:  Positive for back pain. Negative for myalgias.   Skin:  Positive for wound. Negative for rash.   Neurological:  Positive for syncope and light-headedness. Negative for dizziness and headaches.   Hematological:  Does not bruise/bleed easily.   Psychiatric/Behavioral: Negative.     All other systems reviewed and are negative.      Physical Exam  Physical Exam  Vitals and nursing note reviewed.   Constitutional:       General: She is not in acute distress.     Appearance: She is well-developed. She is not ill-appearing or diaphoretic.   HENT:      Head: Normocephalic and atraumatic.   Eyes:      Extraocular Movements: Extraocular movements intact.      Conjunctiva/sclera: Conjunctivae normal.      Pupils: Pupils are equal, round, and reactive to light.   Cardiovascular:      Rate and Rhythm: Normal rate and regular rhythm.      Heart sounds: Normal heart sounds. No murmur heard.  Pulmonary:      Effort: Pulmonary effort is normal. No respiratory distress.      Breath sounds: Normal breath sounds.   Abdominal:      General: Bowel sounds are normal. There is no distension.      Palpations: Abdomen is soft.      Tenderness: There is no  abdominal tenderness.   Musculoskeletal:         General: No deformity. Normal range of motion.      Cervical back: Normal range of motion and neck supple.      Right lower leg: No edema.      Left lower leg: No edema.      Comments: + superficial abrasion right posterior upper back, no bleeding, area ttp   Skin:     General: Skin is warm and dry.      Coloration: Skin is not pale.      Findings: No rash.   Neurological:      General: No focal deficit present.      Mental Status: She is alert and oriented to person, place, and time.      Cranial Nerves: No cranial nerve deficit.      Motor: No weakness.      Comments: No drift, no droop, motor/speech intact, finger to nose intact   Psychiatric:         Mood and Affect: Mood normal.         Behavior: Behavior normal.         Vital Signs  ED Triage Vitals [02/04/24 2329]   Temperature Pulse Respirations Blood Pressure SpO2   98.3 °F (36.8 °C) 83 16 148/82 100 %      Temp Source Heart Rate Source Patient Position - Orthostatic VS BP Location FiO2 (%)   Oral Monitor Sitting Right arm --      Pain Score       5           Vitals:    02/04/24 2329   BP: 148/82   Pulse: 83   Patient Position - Orthostatic VS: Sitting         Visual Acuity      ED Medications  Medications   predniSONE tablet 40 mg (has no administration in time range)   naproxen (NAPROSYN) tablet 500 mg (has no administration in time range)   cyclobenzaprine (FLEXERIL) tablet 10 mg (has no administration in time range)   sodium chloride 0.9 % bolus 1,000 mL (1,000 mL Intravenous New Bag 2/4/24 0758)   bacitracin topical ointment 1 large application (1 large application Topical Given 2/5/24 0059)       Diagnostic Studies  Results Reviewed       Procedure Component Value Units Date/Time    Urine Microscopic [[id number]]  (Abnormal) Collected: 02/05/24 0011    Lab Status: Final result Specimen: Urine, Clean Catch Updated: 02/05/24 0041     RBC, UA 4-10 /hpf      WBC, UA 2-4 /hpf      Epithelial Cells Moderate  /hpf      Bacteria, UA Occasional /hpf      Hyaline Casts, UA 1-2 /lpf      MUCUS THREADS Moderate    UA (URINE) with reflex to Scope [[id number]]  (Abnormal) Collected: 02/05/24 0011    Lab Status: Final result Specimen: Urine, Clean Catch Updated: 02/05/24 0034     Color, UA Yellow     Clarity, UA Clear     Specific Gravity, UA 1.010     pH, UA 6.5     Leukocytes, UA Negative     Nitrite, UA Negative     Protein, UA Negative mg/dl      Glucose, UA Negative mg/dl      Ketones, UA Negative mg/dl      Urobilinogen, UA 0.2 E.U./dl      Bilirubin, UA Negative     Occult Blood, UA Moderate    Comprehensive metabolic panel [[id number]]  (Abnormal) Collected: 02/04/24 2345    Lab Status: Final result Specimen: Blood from Arm, Right Updated: 02/05/24 0013     Sodium 134 mmol/L      Potassium 4.7 mmol/L      Chloride 104 mmol/L      CO2 23 mmol/L      ANION GAP 7 mmol/L      BUN 15 mg/dL      Creatinine 0.79 mg/dL      Glucose 117 mg/dL      Calcium 9.0 mg/dL      AST 30 U/L      ALT 18 U/L      Alkaline Phosphatase 52 U/L      Total Protein 6.7 g/dL      Albumin 4.0 g/dL      Total Bilirubin 0.34 mg/dL      eGFR 107 ml/min/1.73sq m     Narrative:      National Kidney Disease Foundation guidelines for Chronic Kidney Disease (CKD):     Stage 1 with normal or high GFR (GFR > 90 mL/min/1.73 square meters)    Stage 2 Mild CKD (GFR = 60-89 mL/min/1.73 square meters)    Stage 3A Moderate CKD (GFR = 45-59 mL/min/1.73 square meters)    Stage 3B Moderate CKD (GFR = 30-44 mL/min/1.73 square meters)    Stage 4 Severe CKD (GFR = 15-29 mL/min/1.73 square meters)    Stage 5 End Stage CKD (GFR <15 mL/min/1.73 square meters)  Note: GFR calculation is accurate only with a steady state creatinine    POCT pregnancy, urine [[id number]]  (Normal) Resulted: 02/05/24 0012    Lab Status: Final result Updated: 02/05/24 0012     EXT Preg Test, Ur Negative     Control Valid    CBC and differential [[id number]]  (Abnormal) Collected: 02/04/24 2345    Lab  Status: Final result Specimen: Blood from Arm, Right Updated: 02/04/24 2351     WBC 9.35 Thousand/uL      RBC 4.75 Million/uL      Hemoglobin 11.5 g/dL      Hematocrit 37.1 %      MCV 78 fL      MCH 24.2 pg      MCHC 31.0 g/dL      RDW 17.2 %      MPV 10.3 fL      Platelets 365 Thousands/uL      nRBC 0 /100 WBCs      Neutrophils Relative 62 %      Immat GRANS % 0 %      Lymphocytes Relative 25 %      Monocytes Relative 6 %      Eosinophils Relative 6 %      Basophils Relative 1 %      Neutrophils Absolute 5.82 Thousands/µL      Immature Grans Absolute 0.02 Thousand/uL      Lymphocytes Absolute 2.29 Thousands/µL      Monocytes Absolute 0.59 Thousand/µL      Eosinophils Absolute 0.55 Thousand/µL      Basophils Absolute 0.08 Thousands/µL     Fingerstick Glucose (POCT) [[id number]]  (Abnormal) Collected: 02/04/24 2331    Lab Status: Final result Updated: 02/04/24 2337     POC Glucose 169 mg/dl                    XR lumbar spine 2 or 3 views   ED Interpretation by Vibha Gallego MD (02/05 0051)   Curvature lumbar spine      XR ribs with pa chest min 3 views RIGHT   ED Interpretation by Vibha Gallego MD (02/05 0051)   negative                 Procedures  ECG 12 Lead Documentation Only    Date/Time: 2/4/2024 11:51 PM    Performed by: Vibha Gallego MD  Authorized by: Vibha Gallego MD    Indications / Diagnosis:  Syncope  ECG reviewed by me, the ED Provider: yes    Patient location:  ED  Previous ECG:     Previous ECG:  Unavailable  Interpretation:     Interpretation: normal    Rate:     ECG rate:  75    ECG rate assessment: normal    Rhythm:     Rhythm: sinus rhythm    Ectopy:     Ectopy: none    QRS:     QRS axis:  Normal  Conduction:     Conduction: normal    ST segments:     ST segments:  Normal  T waves:     T waves: normal             ED Course                               SBIRT 20yo+      Flowsheet Row Most Recent Value   Initial Alcohol Screen: US AUDIT-C     1. How often do you have a drink containing alcohol? 1  Filed at: 02/04/2024 2329   2. How many drinks containing alcohol do you have on a typical day you are drinking?  0 Filed at: 02/04/2024 2329   3a. Male UNDER 65: How often do you have five or more drinks on one occasion? 0 Filed at: 02/04/2024 2329   3b. FEMALE Any Age, or MALE 65+: How often do you have 4 or more drinks on one occassion? 0 Filed at: 02/04/2024 2329   Audit-C Score 1 Filed at: 02/04/2024 2329   KENDRA: How many times in the past year have you...    Used an illegal drug or used a prescription medication for non-medical reasons? Never Filed at: 02/04/2024 2329                      Medical Decision Making  Evaluation and exam normal.  Will treat for back pain with sciatica.  Will place referral for cardiology for possible Holter monitor.  Advised rest, fluids.    Amount and/or Complexity of Data Reviewed  Labs: ordered.  Radiology: ordered and independent interpretation performed.    Risk  OTC drugs.             Disposition  Final diagnoses:   Syncope   Sciatica   Abrasion of right side of back, initial encounter     Time reflects when diagnosis was documented in both MDM as applicable and the Disposition within this note       Time User Action Codes Description Comment    2/5/2024 12:58 AM Vibha Gallego [R55] Syncope     2/5/2024 12:58 AM Vibha Gallego Add [M54.30] Sciatica     2/5/2024 12:59 AM Vibha Gallego Add [S20.411A] Abrasion of right side of back, initial encounter           ED Disposition       ED Disposition   Discharge    Condition   Stable    Date/Time   Mon Feb 5, 2024 0058    Comment   Myriam Smith discharge to home/self care.                   Follow-up Information    None         Patient's Medications   Discharge Prescriptions    CYCLOBENZAPRINE (FLEXERIL) 10 MG TABLET    Take 1 tablet (10 mg total) by mouth 2 (two) times a day as needed (back pain)       Start Date: 2/5/2024  End Date: --       Order Dose: 10 mg       Quantity: 10 tablet    Refills: 0    PREDNISONE 10  MG TABLET    4 po once daily x2 days, then 3 po daily x2 days, then 2 po daily x2 days,then 1 po daily x2days       Start Date: 2/5/2024  End Date: --       Order Dose: --       Quantity: 20 tablet    Refills: 0           PDMP Review         Value Time User    PDMP Reviewed  Yes 1/3/2024  6:54 AM Brianna Fay, PhD            ED Provider  Electronically Signed by             Vibha Gallego MD  02/05/24 0101

## 2024-02-12 ENCOUNTER — OFFICE VISIT (OUTPATIENT)
Dept: GASTROENTEROLOGY | Facility: CLINIC | Age: 24
End: 2024-02-12
Payer: COMMERCIAL

## 2024-02-12 VITALS
OXYGEN SATURATION: 98 % | HEART RATE: 96 BPM | WEIGHT: 229.4 LBS | HEIGHT: 64 IN | SYSTOLIC BLOOD PRESSURE: 131 MMHG | DIASTOLIC BLOOD PRESSURE: 103 MMHG | BODY MASS INDEX: 39.16 KG/M2

## 2024-02-12 DIAGNOSIS — R10.33 PERIUMBILICAL ABDOMINAL PAIN: ICD-10-CM

## 2024-02-12 DIAGNOSIS — K21.9 GASTROESOPHAGEAL REFLUX DISEASE, UNSPECIFIED WHETHER ESOPHAGITIS PRESENT: ICD-10-CM

## 2024-02-12 DIAGNOSIS — R71.8 MICROCYTIC RED BLOOD CELLS: Primary | ICD-10-CM

## 2024-02-12 DIAGNOSIS — R19.8 IRREGULAR BOWEL HABITS: ICD-10-CM

## 2024-02-12 DIAGNOSIS — Z83.79 FAMILY HISTORY OF CROHN'S DISEASE: ICD-10-CM

## 2024-02-12 PROCEDURE — 99214 OFFICE O/P EST MOD 30 MIN: CPT | Performed by: PHYSICIAN ASSISTANT

## 2024-02-12 RX ORDER — BISACODYL 5 MG/1
10 TABLET, DELAYED RELEASE ORAL ONCE
Qty: 2 TABLET | Refills: 0 | Status: SHIPPED | OUTPATIENT
Start: 2024-02-12 | End: 2024-02-12

## 2024-02-12 NOTE — PATIENT INSTRUCTIONS
Scheduled date of EGD/colonoscopy (as of today): 3/12/24  Physician performing EGD/colonoscopy:Lucy  Location of EGD/colonoscopy: [facility]  Desired bowel prep reviewed with patient: Golytely  Instructions reviewed with patient by: Sabrina  Clearances:  none

## 2024-02-12 NOTE — PROGRESS NOTES
Follow-up Note -  [facility]  Myriam Smith 2000 23 y.o. female         ASSESSMENT & PLAN:    #1.  Microcytosis in the absence of gem bleeding (she does note occasional dark-colored stools, and also endorses fairly regular NSAID use), question for chronic occult GI blood loss from peptic ulcer disease, AVMs, or colorectal lesions, or early inflammatory bowel disease in view of her other symptoms, see below    -Plan for EGD and colonoscopy    -Procedures were explained in detail to the patient at this time including associated risks and benefits, risks including but not limited to infection, perforation and bleeding    -Prescription and instructions provided for colonoscopy prep    -Monitor hemoglobin, patient says she will be seeing her family doctor in the next couple of weeks and will be having lab work done at that time, I have added an iron panel to include with this      #2.  Irregular bowel habits with intermittent periumbilical abdominal pains described as gas pains, consistent with irritable bowel syndrome but rule out early inflammatory bowel disease, she reports maternal history of Crohn's disease and again has new microcytosis suggestive of iron deficiency    -Again we will plan for EGD and colonoscopy, recommend random colon biopsies, gastric biopsies for H. pylori and duodenal biopsies for celiac disease    -Can discuss in more detail about potential lifestyle and dietary modification strategies pending results of endoscopic evaluations, can also consider use of an antispasmodic such as Bentyl although we will hold off on this for now as patient does appear to have some constipation    -Would generally recommend regular fluid intake, can also use MiraLAX on a regular schedule so as to prevent constipation and resultant stool loading/urgency      #3.  Chronic GERD, patient reports significant acid reflux when she is off PPI therapy    -Renew patient's Nexium at this time,  again we will also plan for EGD    -Advised patient about dietary and lifestyle modification strategies for the mitigation of GERD        Reason: Follow-up; irregular bowel habits, intermittent periumbilical abdominal pain, GERD, microcytosis    HPI: 23-year-old female with history of anxiety/depression, diabetes, BMI 40.2 who presents for follow-up, I had last seen her in the office about 2 years ago in December 2021 for follow-up of chronic GERD and for symptoms of nausea with smell triggers, she was advised about minimizing NSAIDs where possible (was on Mobic at the time), continuing Nexium, and advised about small frequent meals and continuing follow-up with her endocrinologist for management of her diabetes.    Her last EGD was done in November 2020 (irregular Z-line, mild gastritis; no evidence of H. pylori, EOE or celiac disease on biopsies).    At this time, patient says she does need refills for her omeprazole as she says she does have bad heartburn whenever she goes without this medication.  She says lately however she has been having more problems with cycling bowel habits, can go up to 4 to 5 days without having a bowel movement before eventually experiencing movements with urgency and cramping.  She says she gets periumbilical abdominal pains which she describes as gas pains, sometimes correlated with bowel movements although not strictly.  She reports her mother had Crohn's disease, her brother has IBS, no known family history of colon cancer.      Of note recent lab work from this month indicated depressed MCV of 78, compared with 92 in 2020, her hemoglobin is 11.5 which is slightly below baseline although not anemic.  She was just seen in the emergency room earlier this month with syncopal episodes.  She reports her periods have not been especially heavy and have been in fact less heavy over the last 5 to 6 years compared to prior.  She says she takes Aleve about 3 times a week on average for various  pains.  She says the consistency of her stool generally varies.  She says her stool has been black color a couple of times but generally is not bloody and is usually brown.      REVIEW OF SYSTEMS:      CONSTITUTIONAL: Denies any fever, chills, or rigors. Good appetite, and no recent weight loss.  HEENT: No earache or tinnitus. Denies hearing loss or visual disturbances.  CARDIOVASCULAR: No chest pain or palpitations.   RESPIRATORY: Denies any cough, hemoptysis, shortness of breath or dyspnea on exertion.  GASTROINTESTINAL: As noted in the History of Present Illness.   GENITOURINARY: No problems with urination. Denies any hematuria or dysuria.  NEUROLOGIC: No dizziness or vertigo, denies headaches.   MUSCULOSKELETAL: Denies any muscle or joint pain.   SKIN: Denies skin rashes or itching.   ENDOCRINE: Denies excessive thirst. Denies intolerance to heat or cold.  PSYCHOSOCIAL: Denies depression or anxiety. Denies any recent memory loss.     Past Medical History:   Diagnosis Date    Anxiety     Asthma     Depression     Head injury     High triglycerides     total triglyceride 187 repeat normal 70    Psychiatric disorder       Past Surgical History:   Procedure Laterality Date    KNEE ARTHROSCOPY Right 2015    KNEE ARTHROSCOPY Left 2013    KNEE ARTHROSCOPY Left 2017    KNEE SURGERY      UPPER GASTROINTESTINAL ENDOSCOPY       Social History     Socioeconomic History    Marital status: Single     Spouse name: Not on file    Number of children: 0    Years of education: Not on file    Highest education level: Some college, no degree   Occupational History    Not on file   Tobacco Use    Smoking status: Never     Passive exposure: Past    Smokeless tobacco: Never    Tobacco comments:     vape daily   Vaping Use    Vaping status: Every Day    Substances: Nicotine, THC, Flavoring   Substance and Sexual Activity    Alcohol use: Yes     Alcohol/week: 2.0 standard drinks of alcohol     Types: 2 Shots of liquor per week      Comment: socially    Drug use: Yes     Types: Marijuana    Sexual activity: Yes     Partners: Male   Other Topics Concern    Not on file   Social History Narrative    12th grade     Social Determinants of Health     Financial Resource Strain: Not on file   Food Insecurity: Not on file   Transportation Needs: Not on file   Physical Activity: Not on file   Stress: Not on file   Social Connections: Not on file   Intimate Partner Violence: Not on file   Housing Stability: Not on file     Family History   Problem Relation Age of Onset    Anxiety disorder Mother     Depression Mother     Asthma Mother     Crohn's disease Mother     Bipolar disorder Mother     Cervical cancer Mother     Depression Maternal Aunt     Anxiety disorder Maternal Aunt     Anxiety disorder Maternal Uncle     Depression Maternal Grandfather     Hypertension Maternal Grandmother     Alcohol abuse Paternal Grandfather     Breast cancer Family      Patient has no known allergies.  Current Outpatient Medications   Medication Sig Dispense Refill    albuterol (PROVENTIL HFA,VENTOLIN HFA) 90 mcg/act inhaler Inhale 1-2 puffs as needed for wheezing 18 g 0    ALPRAZolam (XANAX) 0.5 mg tablet TAKE ONE TABLET BY MOUTH TWICE A DAY AS NEEDED FOR ANXIETY 60 tablet 0    benzonatate (TESSALON) 200 MG capsule Take 1 capsule (200 mg total) by mouth 3 (three) times a day as needed for cough 20 capsule 0    Blood Glucose Monitoring Suppl (OneTouch Verio) w/Device KIT Use to test blood sugar three times a day 1 kit 0    cyclobenzaprine (FLEXERIL) 10 mg tablet Take 1 tablet (10 mg total) by mouth 2 (two) times a day as needed (back pain) 10 tablet 0    DULoxetine (CYMBALTA) 60 mg delayed release capsule TAKE ONE CAPSULE BY MOUTH TWICE A DAY 60 capsule 1    esomeprazole (NexIUM) 20 mg capsule TAKE ONE CAPSULE BY MOUTH EVERY DAY IN THE EARLY MORNING 30 capsule 0    esomeprazole (NexIUM) 20 mg capsule Take 1 capsule (20 mg total) by mouth daily in the early morning 30  "capsule 4    glucose blood (OneTouch Verio) test strip Test blood sugar three times a day 300 each 3    lamoTRIgine (LaMICtal) 150 MG tablet TAKE ONE TABLET BY MOUTH EVERY DAY 30 tablet 3    Lancets (onetouch ultrasoft) lancets Test blood sugar three times a day 300 each 3    norgestimate-ethinyl estradiol (ORTHO-CYCLEN) 0.25-35 MG-MCG per tablet TAKE ONE TABLET BY MOUTH EVERY DAY (SKIP PLACEBO PILLS, CONTINUOUS CONTRACEPTIVE) 84 tablet 0    predniSONE 10 mg tablet 4 po once daily x2 days, then 3 po daily x2 days, then 2 po daily x2 days,then 1 po daily x2days 20 tablet 0     No current facility-administered medications for this visit.       Last menstrual period 02/04/2024, not currently breastfeeding.    PHYSICAL EXAM:      General Appearance:   Alert, cooperative, no distress, appears stated age    HEENT:   Normocephalic, atraumatic, anicteric.     Neck:  Supple, symmetrical, trachea midline, no adenopathy;    thyroid: no enlargement/tenderness/nodules; no carotid  bruit or JVD    Lungs:   Clear to auscultation bilaterally; no rales, rhonchi or wheezing; respirations unlabored    Heart::   S1 and S2 normal; regular rate and rhythm; no murmur, rub, or gallop.   Abdomen:   Soft, non-tender, non-distended; normal bowel sounds; no masses, no organomegaly    Extremities: No edema, erythema, wounds, rashes   Rectal:   Deferred                      Lab Results   Component Value Date    WBC 9.35 02/04/2024    HGB 11.5 02/04/2024    HCT 37.1 02/04/2024    MCV 78 (L) 02/04/2024     02/04/2024     Lab Results   Component Value Date    CALCIUM 9.0 02/04/2024    K 4.7 02/04/2024    CO2 23 02/04/2024     02/04/2024    BUN 15 02/04/2024    CREATININE 0.79 02/04/2024     Lab Results   Component Value Date    ALT 18 02/04/2024    AST 30 02/04/2024    ALKPHOS 52 02/04/2024     No results found for: \"INR\", \"PROTIME\"    XR ribs with pa chest min 3 views RIGHT    Result Date: 2/5/2024  Impression: No evidence of a rib " fracture. No acute cardiopulmonary disease. Workstation performed: [id number]     XR lumbar spine 2 or 3 views    Result Date: 2/5/2024  Impression: No acute lumbar spinal abnormalities Workstation performed: [id number]

## 2024-02-14 ENCOUNTER — OFFICE VISIT (OUTPATIENT)
Dept: PSYCHIATRY | Facility: CLINIC | Age: 24
End: 2024-02-14
Payer: COMMERCIAL

## 2024-02-14 VITALS
DIASTOLIC BLOOD PRESSURE: 85 MMHG | HEART RATE: 92 BPM | HEIGHT: 64 IN | BODY MASS INDEX: 39.78 KG/M2 | SYSTOLIC BLOOD PRESSURE: 120 MMHG | WEIGHT: 233 LBS

## 2024-02-14 DIAGNOSIS — E55.9 VITAMIN D INSUFFICIENCY: ICD-10-CM

## 2024-02-14 DIAGNOSIS — F41.1 GENERALIZED ANXIETY DISORDER: ICD-10-CM

## 2024-02-14 DIAGNOSIS — F41.1 GAD (GENERALIZED ANXIETY DISORDER): ICD-10-CM

## 2024-02-14 DIAGNOSIS — Z79.899 HIGH RISK MEDICATIONS (NOT ANTICOAGULANTS) LONG-TERM USE: ICD-10-CM

## 2024-02-14 DIAGNOSIS — F33.1 MODERATE EPISODE OF RECURRENT MAJOR DEPRESSIVE DISORDER (HCC): ICD-10-CM

## 2024-02-14 DIAGNOSIS — F39 MOOD DISORDER (HCC): Primary | ICD-10-CM

## 2024-02-14 PROCEDURE — 99214 OFFICE O/P EST MOD 30 MIN: CPT | Performed by: NURSE PRACTITIONER

## 2024-02-14 PROCEDURE — 90833 PSYTX W PT W E/M 30 MIN: CPT | Performed by: NURSE PRACTITIONER

## 2024-02-14 RX ORDER — DULOXETIN HYDROCHLORIDE 60 MG/1
60 CAPSULE, DELAYED RELEASE ORAL 2 TIMES DAILY
Qty: 60 CAPSULE | Refills: 3 | Status: SHIPPED | OUTPATIENT
Start: 2024-02-14

## 2024-02-14 RX ORDER — ALPRAZOLAM 0.5 MG/1
0.5 TABLET ORAL 2 TIMES DAILY PRN
Qty: 60 TABLET | Refills: 0 | Status: SHIPPED | OUTPATIENT
Start: 2024-02-14

## 2024-02-14 RX ORDER — LAMOTRIGINE 150 MG/1
150 TABLET ORAL DAILY
Qty: 30 TABLET | Refills: 3 | Status: SHIPPED | OUTPATIENT
Start: 2024-02-14

## 2024-02-14 NOTE — PSYCH
Visit Time    Visit Start Time: 11:00  Visit Stop Time: 11:30  Total Visit Duration:  30 minutes    Subjective:     Patient ID: Myriam Smith is a 23 y.o. female.  History of anxiety, mood disorder, depression, seen for medication management and mood assessment.  Myriam reports she continues to have anxiety with skin picking, it was recommended for her to try NAC.  She reports she is eating and sleeping well, continues to smoke cannabis.  Takes medication as prescribed.  Denies depression or suicidal ideation.  She graduated with her associates in [employer] and will be working before she continues further school.  Family are supportive.    HPI ROS Appetite Changes and Sleep: normal appetite and normal energy level    Review Of Systems:     Mood Anxiety   Behavior Normal    Thought Content Normal   General Emotional Problems   Personality Normal   Other Psych Symptoms Normal   Constitutional As Noted in HPI   ENT As Noted in HPI   Cardiovascular As Noted in HPI   Respiratory As Noted in HPI   Gastrointestinal As Noted in HPI   Genitourinary As Noted in HPI   Musculoskeletal As Noted in HPI   Integumentary As Noted in HPI   Neurological As Noted in HPI   Endocrine Diabetes   Other Symptoms Normal        Laboratory Results:     Substance Abuse History:  Social History     Substance and Sexual Activity   Drug Use Yes    Types: Marijuana       Family Psychiatric History:   Family History   Problem Relation Age of Onset    Anxiety disorder Mother     Depression Mother     Asthma Mother     Crohn's disease Mother     Bipolar disorder Mother     Cervical cancer Mother     Depression Maternal Aunt     Anxiety disorder Maternal Aunt     Anxiety disorder Maternal Uncle     Depression Maternal Grandfather     Hypertension Maternal Grandmother     Alcohol abuse Paternal Grandfather     Breast cancer Family        The following portions of the patient's history were reviewed and updated as appropriate: allergies, current  medications, past family history, past medical history, past social history, past surgical history, and problem list.    Social History     Socioeconomic History    Marital status: Single     Spouse name: Not on file    Number of children: 0    Years of education: Not on file    Highest education level: Some college, no degree   Occupational History    Not on file   Tobacco Use    Smoking status: Never     Passive exposure: Past    Smokeless tobacco: Never    Tobacco comments:     vape daily   Vaping Use    Vaping status: Every Day    Substances: Nicotine, THC, Flavoring   Substance and Sexual Activity    Alcohol use: Yes     Alcohol/week: 2.0 standard drinks of alcohol     Types: 2 Shots of liquor per week     Comment: socially    Drug use: Yes     Types: Marijuana    Sexual activity: Yes     Partners: Male   Other Topics Concern    Not on file   Social History Narrative    12th grade     Social Determinants of Health     Financial Resource Strain: Not on file   Food Insecurity: Not on file   Transportation Needs: Not on file   Physical Activity: Not on file   Stress: Not on file   Social Connections: Not on file   Intimate Partner Violence: Not on file   Housing Stability: Not on file     Social History     Social History Narrative    12th grade       Objective:       Mental status:  Appearance calm and cooperative , adequate hygiene and grooming, and good eye contact    Mood anxious   Affect affect appropriate    Speech a normal rate   Thought Processes normal thought processes   Hallucinations no hallucinations present    Thought Content no delusions   Abnormal Thoughts no suicidal thoughts  and no homicidal thoughts    Orientation  oriented to person and place and time   Remote Memory short term memory intact and long term memory intact   Attention Span concentration intact   Intellect Appears to be of Average Intelligence   Insight Insight intact   Judgement judgment was intact   Muscle Strength Muscle  strength and tone were normal   Language no difficulty naming common objects   Fund of Knowledge displays adequate knowledge of current events   Pain none   Pain Scale 0       Assessment/Plan:       Diagnoses and all orders for this visit:    Mood disorder (HCC)    Moderate episode of recurrent major depressive disorder (HCC)    Generalized anxiety disorder            Treatment Recommendations- Risks Benefits      Immediate Medical/Psychiatric/Psychotherapy Treatments and Any Precautions: Continue with treatment plan    Risks, Benefits And Possible Side Effects Of Medications:  {PSYCH RISK, BENEFITS AND POSSIBLE SIDE EFFECTS (Optional):30591    Controlled Medication Discussion: She is aware of safe use and storage of medication    Psychotherapy Provided: 30 minutes individual psychotherapy provided.     Supportive therapy  Medication evaluation  Mood assessment  Treatment plan updated  Safety plan developed    Goals discussed in session: Maintain stable mood

## 2024-02-19 NOTE — BH CRISIS PLAN
Client Name: Myriam Smith       Client YOB: 2000    [facility] Safety Plan      Creation Date: 2/14/24 Update Date: 2/14/24   Created By: Brianna Fay, PhD       Step 1: Warning Signs:   Warning Signs   heart beating fast, overwhelming feeling going to explode, shakey            Step 2: Internal Coping Strategies:   Internal Coping Strategies   deep breaths            Step 3: People and social settings that provide distraction:   Name Contact Information   talk to friends cell   mother cell    Places   none           Step 4: People whom I can ask for help during a crisis:      Name Contact Information    friends cell    aunt cell    grandparents cell    mother cell      Step 5: Professionals or agencies I can contact during a crisis:      [facility]inican/Agency Name Phone Emergency Contact    [facility] [phone] Dr. Fay      [facility] Emergency Department Emergency Department Phone Emergency Department Address    [facility]        Crisis Phone Numbers:   Suicide Prevention Lifeline: Call or Text  988 Crisis Text Line: Text HOME to 424-897   Please note: Some [facility] do not have a separate number for Child/Adolescent specific crisis. If your county is not listed under Child/Adolescent, please call the adult number for your county      Adult Crisis Numbers: Child/Adolescent Crisis Numbers   [facility]: [phone] [facility]: [phone]   [facility]: [phone] [facility]: [phone]   [facility]: [phone] [place]: [phone]   [facility]: [phone] [place]: [phone]   [place] [facility]: [phone]   [facility]: [phone]   [facility]: [phone]   [facility]: [phone] (daytime) [phone] (after hours, weekends, holidays)      Step 6: Making the environment safer (plan for lethal means safety):   Plan: Guns are locked unloaded     Optional: What is  most important to me and worth living for?   family     [facility] Safety Plan. Annika Cordero and Dmitriy Patton. Used with permission of the authors.

## 2024-02-19 NOTE — BH TREATMENT PLAN
TREATMENT PLAN (Medication Management Only)         [facility]     Name and Date of Birth:  Myriam Smith 22 y.o. 2000  Date of Treatment Plan: July 6, 2020, updated 10/5/2020, 5/10/2021, 11/9/2021, 5/23/2022, 1/16/2023, 8/7/2023, 2/14/2024  Diagnosis/Diagnoses:    1. Persistent mood (affective) disorder, unspecified (HCC)    2. Anxiety disorder, unspecified type       Strengths/Personal Resources for Self-Care: supportive family, supportive friends, taking medications as prescribed.  Area/Areas of need (in own words): anxiety, depression  1.         Long Term Goal: maintain mood stability.  Target Date: 6 months - 8/14/2024  Person/Persons responsible for completion of goal: Myriam  2.         Short Term Objective (s) - How will we reach this goal?:   A. Provider new recommended medication/dosage changes and/or continue medication(s): continue current medications as prescribed.  B. Reduce vaping.  C. take medication as prescribed, therapy.  Target Date: 6 months - 8/14/2024  Person/Persons Responsible for Completion of Goal: Myriam  Progress Towards Goals: continuing treatment  Treatment Modality: medication management every 3 months  Review due 180 days from date of this plan: 6 months -8/14/2024     Expected length of service: ongoing treatment  My Physician/PA/NP and I have developed this plan together and I agree to work on the goals and objectives. I understand the treatment goals that were developed for my treatment

## 2024-02-20 ENCOUNTER — LAB (OUTPATIENT)
Dept: LAB | Facility: HOSPITAL | Age: 24
End: 2024-02-20
Payer: COMMERCIAL

## 2024-02-20 DIAGNOSIS — F39 MILD MOOD DISORDER (HCC): ICD-10-CM

## 2024-02-20 DIAGNOSIS — R10.33 PERIUMBILICAL ABDOMINAL PAIN: ICD-10-CM

## 2024-02-20 DIAGNOSIS — R19.8 IRREGULAR BOWEL HABITS: ICD-10-CM

## 2024-02-20 DIAGNOSIS — Z79.899 ENCOUNTER FOR LONG-TERM (CURRENT) USE OF OTHER MEDICATIONS: ICD-10-CM

## 2024-02-20 DIAGNOSIS — R71.8 MICROCYTIC RED BLOOD CELLS: ICD-10-CM

## 2024-02-20 DIAGNOSIS — Z83.79 FAMILY HISTORY OF CROHN'S DISEASE: ICD-10-CM

## 2024-02-20 DIAGNOSIS — F41.1 GENERALIZED ANXIETY DISORDER: ICD-10-CM

## 2024-02-20 DIAGNOSIS — E55.9 VITAMIN D DEFICIENCY: ICD-10-CM

## 2024-02-20 DIAGNOSIS — K21.9 GASTROESOPHAGEAL REFLUX DISEASE, UNSPECIFIED WHETHER ESOPHAGITIS PRESENT: ICD-10-CM

## 2024-02-20 LAB
25(OH)D3 SERPL-MCNC: 21.3 NG/ML (ref 30–100)
ALBUMIN SERPL BCP-MCNC: 3.9 G/DL (ref 3.5–5)
ALP SERPL-CCNC: 56 U/L (ref 34–104)
ALT SERPL W P-5'-P-CCNC: 15 U/L (ref 7–52)
ANION GAP SERPL CALCULATED.3IONS-SCNC: 8 MMOL/L
AST SERPL W P-5'-P-CCNC: 14 U/L (ref 13–39)
BASOPHILS # BLD AUTO: 0.06 THOUSANDS/ÂΜL (ref 0–0.1)
BASOPHILS NFR BLD AUTO: 1 % (ref 0–1)
BILIRUB SERPL-MCNC: 0.24 MG/DL (ref 0.2–1)
BUN SERPL-MCNC: 11 MG/DL (ref 5–25)
CALCIUM SERPL-MCNC: 8.5 MG/DL (ref 8.4–10.2)
CHLORIDE SERPL-SCNC: 104 MMOL/L (ref 96–108)
CHOLEST SERPL-MCNC: 174 MG/DL
CO2 SERPL-SCNC: 25 MMOL/L (ref 21–32)
CREAT SERPL-MCNC: 0.86 MG/DL (ref 0.6–1.3)
CRP SERPL QL: 12.2 MG/L
EOSINOPHIL # BLD AUTO: 0.52 THOUSAND/ÂΜL (ref 0–0.61)
EOSINOPHIL NFR BLD AUTO: 6 % (ref 0–6)
ERYTHROCYTE [DISTWIDTH] IN BLOOD BY AUTOMATED COUNT: 18.1 % (ref 11.6–15.1)
FERRITIN SERPL-MCNC: 5 NG/ML (ref 11–307)
GFR SERPL CREATININE-BSD FRML MDRD: 96 ML/MIN/1.73SQ M
GLUCOSE P FAST SERPL-MCNC: 92 MG/DL (ref 65–99)
HCT VFR BLD AUTO: 37.1 % (ref 34.8–46.1)
HDLC SERPL-MCNC: 56 MG/DL
HGB BLD-MCNC: 11.2 G/DL (ref 11.5–15.4)
IMM GRANULOCYTES # BLD AUTO: 0.03 THOUSAND/UL (ref 0–0.2)
IMM GRANULOCYTES NFR BLD AUTO: 0 % (ref 0–2)
IRON SATN MFR SERPL: 4 % (ref 15–50)
IRON SERPL-MCNC: 19 UG/DL (ref 50–212)
LDLC SERPL CALC-MCNC: 73 MG/DL (ref 0–100)
LYMPHOCYTES # BLD AUTO: 2.33 THOUSANDS/ÂΜL (ref 0.6–4.47)
LYMPHOCYTES NFR BLD AUTO: 25 % (ref 14–44)
MCH RBC QN AUTO: 23.5 PG (ref 26.8–34.3)
MCHC RBC AUTO-ENTMCNC: 30.2 G/DL (ref 31.4–37.4)
MCV RBC AUTO: 78 FL (ref 82–98)
MONOCYTES # BLD AUTO: 0.41 THOUSAND/ÂΜL (ref 0.17–1.22)
MONOCYTES NFR BLD AUTO: 4 % (ref 4–12)
NEUTROPHILS # BLD AUTO: 6.09 THOUSANDS/ÂΜL (ref 1.85–7.62)
NEUTS SEG NFR BLD AUTO: 64 % (ref 43–75)
NRBC BLD AUTO-RTO: 0 /100 WBCS
PLATELET # BLD AUTO: 339 THOUSANDS/UL (ref 149–390)
PMV BLD AUTO: 10.1 FL (ref 8.9–12.7)
POTASSIUM SERPL-SCNC: 4 MMOL/L (ref 3.5–5.3)
PROT SERPL-MCNC: 6.5 G/DL (ref 6.4–8.4)
RBC # BLD AUTO: 4.76 MILLION/UL (ref 3.81–5.12)
SODIUM SERPL-SCNC: 137 MMOL/L (ref 135–147)
T3 SERPL-MCNC: 1.8 NG/ML
T4 FREE SERPL-MCNC: 0.52 NG/DL (ref 0.61–1.12)
T4 SERPL-MCNC: 11.42 UG/DL (ref 6.09–12.23)
TIBC SERPL-MCNC: 446 UG/DL (ref 250–450)
TRIGL SERPL-MCNC: 223 MG/DL
TSH SERPL DL<=0.05 MIU/L-ACNC: 1.54 UIU/ML (ref 0.45–4.5)
UIBC SERPL-MCNC: 427 UG/DL (ref 155–355)
VIT B12 SERPL-MCNC: 186 PG/ML (ref 180–914)
WBC # BLD AUTO: 9.44 THOUSAND/UL (ref 4.31–10.16)

## 2024-02-20 PROCEDURE — 36415 COLL VENOUS BLD VENIPUNCTURE: CPT

## 2024-02-20 PROCEDURE — 84480 ASSAY TRIIODOTHYRONINE (T3): CPT

## 2024-02-20 PROCEDURE — 80053 COMPREHEN METABOLIC PANEL: CPT

## 2024-02-20 PROCEDURE — 84439 ASSAY OF FREE THYROXINE: CPT

## 2024-02-20 PROCEDURE — 82607 VITAMIN B-12: CPT

## 2024-02-20 PROCEDURE — 83540 ASSAY OF IRON: CPT

## 2024-02-20 PROCEDURE — 82306 VITAMIN D 25 HYDROXY: CPT

## 2024-02-20 PROCEDURE — 80061 LIPID PANEL: CPT

## 2024-02-20 PROCEDURE — 84436 ASSAY OF TOTAL THYROXINE: CPT

## 2024-02-20 PROCEDURE — 83550 IRON BINDING TEST: CPT

## 2024-02-20 PROCEDURE — 86140 C-REACTIVE PROTEIN: CPT

## 2024-02-20 PROCEDURE — 84479 ASSAY OF THYROID (T3 OR T4): CPT

## 2024-02-20 PROCEDURE — 82728 ASSAY OF FERRITIN: CPT

## 2024-02-20 PROCEDURE — 84443 ASSAY THYROID STIM HORMONE: CPT

## 2024-02-20 PROCEDURE — 85025 COMPLETE CBC W/AUTO DIFF WBC: CPT

## 2024-02-21 LAB — T3RU NFR SERPL: 17 % (ref 24–39)

## 2024-02-22 NOTE — RESULT ENCOUNTER NOTE
Telephone call to Myriam regarding abnormal lab work and requested that she call her PCP to address treatment or next steps. She will call PCP

## 2024-02-27 ENCOUNTER — ANESTHESIA EVENT (OUTPATIENT)
Dept: ANESTHESIOLOGY | Facility: HOSPITAL | Age: 24
End: 2024-02-27

## 2024-02-27 ENCOUNTER — ANESTHESIA (OUTPATIENT)
Dept: ANESTHESIOLOGY | Facility: HOSPITAL | Age: 24
End: 2024-02-27

## 2024-03-05 ENCOUNTER — TELEPHONE (OUTPATIENT)
Dept: GASTROENTEROLOGY | Facility: CLINIC | Age: 24
End: 2024-03-05

## 2024-03-05 NOTE — TELEPHONE ENCOUNTER
Left message confirming procedure for 3/12 with Dr. Ayala. She will need a  to and from the facility, will be called day before with arrival time and if she doesn't have her prep or instructions, she should call. If she has any questions, she should call as well.

## 2024-03-12 ENCOUNTER — ANESTHESIA (OUTPATIENT)
Dept: GASTROENTEROLOGY | Facility: AMBULARY SURGERY CENTER | Age: 24
End: 2024-03-12

## 2024-03-12 ENCOUNTER — ANESTHESIA EVENT (OUTPATIENT)
Dept: GASTROENTEROLOGY | Facility: AMBULARY SURGERY CENTER | Age: 24
End: 2024-03-12

## 2024-03-12 ENCOUNTER — HOSPITAL ENCOUNTER (OUTPATIENT)
Dept: GASTROENTEROLOGY | Facility: AMBULARY SURGERY CENTER | Age: 24
Setting detail: OUTPATIENT SURGERY
Discharge: HOME | End: 2024-03-12
Attending: INTERNAL MEDICINE
Payer: COMMERCIAL

## 2024-03-12 VITALS
OXYGEN SATURATION: 99 % | TEMPERATURE: 97.8 F | HEART RATE: 73 BPM | DIASTOLIC BLOOD PRESSURE: 80 MMHG | RESPIRATION RATE: 18 BRPM | SYSTOLIC BLOOD PRESSURE: 126 MMHG

## 2024-03-12 DIAGNOSIS — F41.1 GAD (GENERALIZED ANXIETY DISORDER): ICD-10-CM

## 2024-03-12 DIAGNOSIS — R19.8 IRREGULAR BOWEL HABITS: ICD-10-CM

## 2024-03-12 DIAGNOSIS — Z83.79 FAMILY HISTORY OF CROHN'S DISEASE: ICD-10-CM

## 2024-03-12 DIAGNOSIS — R10.33 PERIUMBILICAL ABDOMINAL PAIN: ICD-10-CM

## 2024-03-12 DIAGNOSIS — R71.8 MICROCYTIC RED BLOOD CELLS: ICD-10-CM

## 2024-03-12 LAB
EXT PREGNANCY TEST URINE: NEGATIVE
EXT. CONTROL: NORMAL

## 2024-03-12 PROCEDURE — 88305 TISSUE EXAM BY PATHOLOGIST: CPT | Performed by: STUDENT IN AN ORGANIZED HEALTH CARE EDUCATION/TRAINING PROGRAM

## 2024-03-12 PROCEDURE — 81025 URINE PREGNANCY TEST: CPT | Performed by: ANESTHESIOLOGY

## 2024-03-12 RX ORDER — SODIUM CHLORIDE, SODIUM LACTATE, POTASSIUM CHLORIDE, CALCIUM CHLORIDE 600; 310; 30; 20 MG/100ML; MG/100ML; MG/100ML; MG/100ML
125 INJECTION, SOLUTION INTRAVENOUS CONTINUOUS
Status: DISCONTINUED | OUTPATIENT
Start: 2024-03-12 | End: 2024-03-16 | Stop reason: HOSPADM

## 2024-03-12 RX ORDER — SODIUM CHLORIDE, SODIUM LACTATE, POTASSIUM CHLORIDE, CALCIUM CHLORIDE 600; 310; 30; 20 MG/100ML; MG/100ML; MG/100ML; MG/100ML
125 INJECTION, SOLUTION INTRAVENOUS CONTINUOUS
Status: CANCELLED | OUTPATIENT
Start: 2024-03-12

## 2024-03-12 RX ORDER — PROPOFOL 10 MG/ML
INJECTION, EMULSION INTRAVENOUS AS NEEDED
Status: DISCONTINUED | OUTPATIENT
Start: 2024-03-12 | End: 2024-03-12

## 2024-03-12 RX ORDER — PROPOFOL 10 MG/ML
INJECTION, EMULSION INTRAVENOUS CONTINUOUS PRN
Status: DISCONTINUED | OUTPATIENT
Start: 2024-03-12 | End: 2024-03-12

## 2024-03-12 RX ORDER — SODIUM CHLORIDE, SODIUM LACTATE, POTASSIUM CHLORIDE, CALCIUM CHLORIDE 600; 310; 30; 20 MG/100ML; MG/100ML; MG/100ML; MG/100ML
INJECTION, SOLUTION INTRAVENOUS CONTINUOUS PRN
Status: DISCONTINUED | OUTPATIENT
Start: 2024-03-12 | End: 2024-03-12

## 2024-03-12 RX ORDER — LIDOCAINE HYDROCHLORIDE 10 MG/ML
INJECTION, SOLUTION EPIDURAL; INFILTRATION; INTRACAUDAL; PERINEURAL AS NEEDED
Status: DISCONTINUED | OUTPATIENT
Start: 2024-03-12 | End: 2024-03-12

## 2024-03-12 RX ADMIN — LIDOCAINE HYDROCHLORIDE 50 MG: 10 INJECTION, SOLUTION EPIDURAL; INFILTRATION; INTRACAUDAL; PERINEURAL at 12:30

## 2024-03-12 RX ADMIN — PROPOFOL 50 MG: 10 INJECTION, EMULSION INTRAVENOUS at 12:33

## 2024-03-12 RX ADMIN — PROPOFOL 150 MG: 10 INJECTION, EMULSION INTRAVENOUS at 12:30

## 2024-03-12 RX ADMIN — SODIUM CHLORIDE, SODIUM LACTATE, POTASSIUM CHLORIDE, AND CALCIUM CHLORIDE: .6; .31; .03; .02 INJECTION, SOLUTION INTRAVENOUS at 12:19

## 2024-03-12 RX ADMIN — PROPOFOL 150 MCG/KG/MIN: 10 INJECTION, EMULSION INTRAVENOUS at 12:30

## 2024-03-12 NOTE — ANESTHESIA POSTPROCEDURE EVALUATION
Post-Op Assessment Note    CV Status:  Stable  Pain Score: 0    Pain management: adequate       Mental Status:  Awake and sleepy   Hydration Status:  Euvolemic   PONV Controlled:  Controlled   Airway Patency:  Patent     Post Op Vitals Reviewed: Yes    No anethesia notable event occurred.    Staff: Anesthesiologist               BP   118/69   Temp      Pulse  83   Resp   16   SpO2   98%

## 2024-03-12 NOTE — H&P
History and Physical -  [facility]  Myriam Smith 23 y.o. female MRN: [id number]    HPI: Myriam Smith is a 23 y.o. year old female who presents with GERD, iron deficiency anemia, irregular stools      Review of Systems    Historical Information   Past Medical History:   Diagnosis Date    Anxiety     Asthma     Depression     Head injury     High triglycerides     total triglyceride 187 repeat normal 70    Psychiatric disorder      Past Surgical History:   Procedure Laterality Date    KNEE ARTHROSCOPY Right 2015    KNEE ARTHROSCOPY Left 2013    KNEE ARTHROSCOPY Left 2017    KNEE SURGERY      UPPER GASTROINTESTINAL ENDOSCOPY       Social History   Social History     Substance and Sexual Activity   Alcohol Use Yes    Alcohol/week: 2.0 standard drinks of alcohol    Types: 2 Shots of liquor per week    Comment: socially     Social History     Substance and Sexual Activity   Drug Use Yes    Types: Marijuana     Social History     Tobacco Use   Smoking Status Never    Passive exposure: Past   Smokeless Tobacco Never   Tobacco Comments    vape daily     Family History   Problem Relation Age of Onset    Anxiety disorder Mother     Depression Mother     Asthma Mother     Crohn's disease Mother     Bipolar disorder Mother     Cervical cancer Mother     Depression Maternal Aunt     Anxiety disorder Maternal Aunt     Anxiety disorder Maternal Uncle     Depression Maternal Grandfather     Hypertension Maternal Grandmother     Alcohol abuse Paternal Grandfather     Breast cancer Family        Meds/Allergies     (Not in a hospital admission)      No Known Allergies    Objective     /76   Pulse 94   Temp 97.8 °F (36.6 °C) (Tympanic)   Resp 16   LMP 03/08/2024   SpO2 97%       PHYSICAL EXAM    Gen: NAD  CV: RRR  CHEST: Clear  ABD: soft, NT/ND  EXT: no edema  Neuro: AAO      ASSESSMENT/PLAN:  This is a 23 y.o. year old female here for GERD, iron deficiency anemia, irregular stools    PLAN:    Procedure: egd/colonoscopy

## 2024-03-12 NOTE — ANESTHESIA PREPROCEDURE EVALUATION
Procedure:  COLONOSCOPY  EGD    Relevant Problems   ENDO   (+) Type 2 diabetes mellitus with hyperglycemia, without long-term current use of insulin (HCC)      GI/HEPATIC   (+) GERD (gastroesophageal reflux disease)      MUSCULOSKELETAL   (+) Patellar tendinitis of right knee      NEURO/PSYCH   (+) Anxiety disorder   (+) Moderate episode of recurrent major depressive disorder (HCC)      PULMONARY   (+) Smoking      Respiratory/Allergy   (+) Reactive airway disease      Other   (+) Obesity        Physical Exam    Airway    Mallampati score: III  TM Distance: >3 FB  Neck ROM: full     Dental   Comment: Denies loose teeth     Cardiovascular  Cardiovascular exam normal    Pulmonary  Pulmonary exam normal     Other Findings  Portions of exam deferred due to low yield and/or unknown COVID statuspost-pubertal.      Anesthesia Plan  ASA Score- 2     Anesthesia Type- IV sedation with anesthesia with ASA Monitors.         Additional Monitors:     Airway Plan:            Plan Factors-Exercise tolerance (METS): >4 METS.    Chart reviewed.   Existing labs reviewed. Patient summary reviewed.    Patient is not a current smoker.              Induction- intravenous.    Postoperative Plan-     Informed Consent- Anesthetic plan and risks discussed with patient.  I personally reviewed this patient with the CRNA. Discussed and agreed on the Anesthesia Plan with the CRNA..

## 2024-03-13 DIAGNOSIS — F41.1 GAD (GENERALIZED ANXIETY DISORDER): ICD-10-CM

## 2024-03-13 RX ORDER — ALPRAZOLAM 0.5 MG/1
TABLET ORAL
Qty: 60 TABLET | Refills: 0 | Status: SHIPPED | OUTPATIENT
Start: 2024-03-13 | End: 2024-03-13

## 2024-03-13 RX ORDER — ALPRAZOLAM 0.5 MG/1
TABLET ORAL
Qty: 60 TABLET | Refills: 0 | Status: SHIPPED | OUTPATIENT
Start: 2024-03-13

## 2024-03-13 NOTE — TELEPHONE ENCOUNTER
Medication Refill Request     Patient states script isn't at pharmacy.      Name of Medication Xanax 0.5 mg  Dose/Frequency 1bid prn anxiety  Quantity 60  Verified pharmacy   [x]  Verified ordering Provider   [x]  Does patient have enough for the next 3 days? Yes [] No [x]  Does patient have a follow-up appointment scheduled? Yes [x] No []   If so when is appointment: 5/8/2024

## 2024-03-14 PROCEDURE — 88305 TISSUE EXAM BY PATHOLOGIST: CPT | Performed by: STUDENT IN AN ORGANIZED HEALTH CARE EDUCATION/TRAINING PROGRAM

## 2024-03-25 ENCOUNTER — OFFICE VISIT (OUTPATIENT)
Dept: FAMILY MEDICINE CLINIC | Facility: CLINIC | Age: 24
End: 2024-03-25
Payer: COMMERCIAL

## 2024-03-25 VITALS
DIASTOLIC BLOOD PRESSURE: 74 MMHG | RESPIRATION RATE: 20 BRPM | BODY MASS INDEX: 38.93 KG/M2 | HEIGHT: 64 IN | HEART RATE: 80 BPM | WEIGHT: 228 LBS | TEMPERATURE: 98.5 F | SYSTOLIC BLOOD PRESSURE: 110 MMHG

## 2024-03-25 DIAGNOSIS — R73.03 PREDIABETES: ICD-10-CM

## 2024-03-25 DIAGNOSIS — Z13.6 SCREENING FOR CARDIOVASCULAR CONDITION: ICD-10-CM

## 2024-03-25 DIAGNOSIS — D50.8 IRON DEFICIENCY ANEMIA SECONDARY TO INADEQUATE DIETARY IRON INTAKE: Primary | ICD-10-CM

## 2024-03-25 DIAGNOSIS — F33.1 MODERATE EPISODE OF RECURRENT MAJOR DEPRESSIVE DISORDER (HCC): ICD-10-CM

## 2024-03-25 PROBLEM — M79.605 PAIN AND SWELLING OF LEFT LOWER EXTREMITY: Status: RESOLVED | Noted: 2018-12-17 | Resolved: 2024-03-25

## 2024-03-25 PROBLEM — E11.65 TYPE 2 DIABETES MELLITUS WITH HYPERGLYCEMIA, WITHOUT LONG-TERM CURRENT USE OF INSULIN (HCC): Status: RESOLVED | Noted: 2021-06-21 | Resolved: 2024-03-25

## 2024-03-25 PROBLEM — M79.89 PAIN AND SWELLING OF LEFT LOWER EXTREMITY: Status: RESOLVED | Noted: 2018-12-17 | Resolved: 2024-03-25

## 2024-03-25 PROBLEM — D50.9 IRON DEFICIENCY ANEMIA: Status: ACTIVE | Noted: 2024-03-25

## 2024-03-25 PROCEDURE — 99213 OFFICE O/P EST LOW 20 MIN: CPT | Performed by: NURSE PRACTITIONER

## 2024-03-25 NOTE — PROGRESS NOTES
Assessment/Plan:    1. Iron deficiency anemia secondary to inadequate dietary iron intake  Assessment & Plan:  Continue with Iron supplementation twice daily.   Will check repeat labs in 3 months     Orders:  -     Fe+TIBC+Siddhartha; Future; Expected date: 06/25/2024  -     Fe+TIBC+Siddhartha    2. Screening for cardiovascular condition  -     CBC and differential; Future; Expected date: 06/25/2024  -     CBC and differential    3. Prediabetes  Assessment & Plan:  Will repeat A1c prior to next visit.  Discussed importance of low carb diet and adequate     Orders:  -     Hemoglobin A1C; Future; Expected date: 06/25/2024  -     Hemoglobin A1C    4. Moderate episode of recurrent major depressive disorder (HCC)  Assessment & Plan:  Management per psychiatry.              Patient Instructions   2000 IU of Vitamin D daily in addition to the iron twice daily.    Daily multivitamin.       Return in about 3 months (around 6/25/2024).    Subjective:      Patient ID: Myriam Smith is a 23 y.o. female.    Chief Complaint   Patient presents with   • Follow-up     Review labs JOHNATHONoylSole       Here today follow up on labs.   Had GI workup d/t iron deficiency, which was negative.   Menses controlled on OCPs, has been on this for many years.  Has been getting frequent headaches.  Sometimes cause her to vomit.   Has been ongoing for awhile, but getting worse over the past month.           The following portions of the patient's history were reviewed and updated as appropriate: allergies, current medications, past family history, past medical history, past social history, past surgical history and problem list.    Review of Systems   Constitutional: Negative.    Respiratory: Negative.     Cardiovascular: Negative.    Gastrointestinal:  Positive for nausea and vomiting.   Neurological:  Positive for headaches.         Current Outpatient Medications   Medication Sig Dispense Refill   • albuterol (PROVENTIL HFA,VENTOLIN HFA) 90 mcg/act inhaler  "Inhale 1-2 puffs as needed for wheezing 18 g 0   • ALPRAZolam (XANAX) 0.5 mg tablet TAKE ONE TABLET BY MOUTH TWICE A DAY AS NEEDED FOR ANXIETY 60 tablet 0   • Blood Glucose Monitoring Suppl (OneTouch Verio) w/Device KIT Use to test blood sugar three times a day 1 kit 0   • cyclobenzaprine (FLEXERIL) 10 mg tablet Take 1 tablet (10 mg total) by mouth 2 (two) times a day as needed (back pain) 10 tablet 0   • DULoxetine (CYMBALTA) 60 mg delayed release capsule Take 1 capsule (60 mg total) by mouth 2 (two) times a day 60 capsule 3   • esomeprazole (NexIUM) 20 mg capsule Take 1 capsule (20 mg total) by mouth daily in the early morning 30 capsule 5   • glucose blood (OneTouch Verio) test strip Test blood sugar three times a day 300 each 3   • lamoTRIgine (LaMICtal) 150 MG tablet Take 1 tablet (150 mg total) by mouth daily 30 tablet 3   • Lancets (onetouch ultrasoft) lancets Test blood sugar three times a day 300 each 3   • norgestimate-ethinyl estradiol (ORTHO-CYCLEN) 0.25-35 MG-MCG per tablet TAKE ONE TABLET BY MOUTH EVERY DAY (SKIP PLACEBO PILLS, CONTINUOUS CONTRACEPTIVE) 84 tablet 0   • benzonatate (TESSALON) 200 MG capsule Take 1 capsule (200 mg total) by mouth 3 (three) times a day as needed for cough (Patient not taking: Reported on 2/12/2024) 20 capsule 0   • predniSONE 10 mg tablet 4 po once daily x2 days, then 3 po daily x2 days, then 2 po daily x2 days,then 1 po daily x2days (Patient not taking: Reported on 2/12/2024) 20 tablet 0     No current facility-administered medications for this visit.       Objective:    /74   Pulse 80   Temp 98.5 °F (36.9 °C)   Resp 20   Ht 5' 4\" (1.626 m)   Wt 103 kg (228 lb)   LMP 03/08/2024 (Exact Date)   BMI 39.14 kg/m²        Physical Exam  Vitals and nursing note reviewed.   Constitutional:       Appearance: Normal appearance. She is well-developed.   Eyes:      Extraocular Movements: Extraocular movements intact.      Conjunctiva/sclera: Conjunctivae normal.      " Pupils: Pupils are equal, round, and reactive to light.   Cardiovascular:      Rate and Rhythm: Normal rate and regular rhythm.      Pulses: Normal pulses.      Heart sounds: Normal heart sounds. No murmur heard.  Pulmonary:      Effort: Pulmonary effort is normal.      Breath sounds: Normal breath sounds.   Skin:     General: Skin is warm and dry.   Neurological:      Mental Status: She is alert.   Psychiatric:         Mood and Affect: Mood normal.         Behavior: Behavior normal.                RAOUL Haddad

## 2024-04-04 DIAGNOSIS — N94.6 DYSMENORRHEA IN ADOLESCENT: ICD-10-CM

## 2024-04-04 DIAGNOSIS — N92.0 MENORRHAGIA WITH REGULAR CYCLE: ICD-10-CM

## 2024-04-04 RX ORDER — NORGESTIMATE AND ETHINYL ESTRADIOL 0.25-0.035
KIT ORAL
Qty: 28 TABLET | Refills: 1 | Status: SHIPPED | OUTPATIENT
Start: 2024-04-04

## 2024-04-19 DIAGNOSIS — F41.1 GAD (GENERALIZED ANXIETY DISORDER): ICD-10-CM

## 2024-04-19 RX ORDER — ALPRAZOLAM 0.5 MG/1
TABLET ORAL
Qty: 60 TABLET | Refills: 0 | Status: SHIPPED | OUTPATIENT
Start: 2024-04-19

## 2024-05-08 ENCOUNTER — OFFICE VISIT (OUTPATIENT)
Dept: PSYCHIATRY | Facility: CLINIC | Age: 24
End: 2024-05-08
Payer: COMMERCIAL

## 2024-05-08 VITALS
BODY MASS INDEX: 39.78 KG/M2 | HEART RATE: 66 BPM | HEIGHT: 64 IN | DIASTOLIC BLOOD PRESSURE: 85 MMHG | SYSTOLIC BLOOD PRESSURE: 132 MMHG | WEIGHT: 233 LBS

## 2024-05-08 DIAGNOSIS — F33.1 MODERATE EPISODE OF RECURRENT MAJOR DEPRESSIVE DISORDER (HCC): ICD-10-CM

## 2024-05-08 DIAGNOSIS — F41.1 GENERALIZED ANXIETY DISORDER: ICD-10-CM

## 2024-05-08 DIAGNOSIS — F43.21 ADJUSTMENT DISORDER WITH DEPRESSED MOOD: Primary | ICD-10-CM

## 2024-05-08 DIAGNOSIS — F39 MOOD DISORDER (HCC): ICD-10-CM

## 2024-05-08 PROCEDURE — 99214 OFFICE O/P EST MOD 30 MIN: CPT | Performed by: NURSE PRACTITIONER

## 2024-05-08 PROCEDURE — 90833 PSYTX W PT W E/M 30 MIN: CPT | Performed by: NURSE PRACTITIONER

## 2024-05-08 RX ORDER — LAMOTRIGINE 200 MG/1
200 TABLET ORAL DAILY
Qty: 30 TABLET | Refills: 3 | Status: SHIPPED | OUTPATIENT
Start: 2024-05-08

## 2024-05-13 NOTE — PSYCH
"Visit Time    Visit Start Time: 11:00  Visit Stop Time: 11:30  Total Visit Duration:  30 minutes    Subjective:     Patient ID: Myriam Smith is a 24 y.o. female.  History of mood swings, anxiety, depression, cannabis use seen for medication management and mood assessment.  She reports her anxiety and depression are slightly reduced due to not having the stress of school.  She also reports cutting down her cannabis use.  Myriam states she has skin picking issues and tends to focus on her face.  Discussed resources such as \"skin picking foundation online\".  She reports her appetite is good and she is sleeping takes medication as prescribed denies suicidal ideation.  Family is supportive    HPI ROS Appetite Changes and Sleep: normal appetite and normal energy level    Review Of Systems:     Mood Anxiety and Depression   Behavior Normal    Thought Content Normal   General Emotional Problems   Personality Normal   Other Psych Symptoms Normal   Constitutional As Noted in HPI   ENT As Noted in HPI   Cardiovascular As Noted in HPI   Respiratory As Noted in HPI   Gastrointestinal As Noted in HPI   Genitourinary As Noted in HPI   Musculoskeletal As Noted in HPI   Integumentary As Noted in HPI   Neurological As Noted in HPI   Endocrine Normal    Other Symptoms Normal        Laboratory Results:     Substance Abuse History:  Social History     Substance and Sexual Activity   Drug Use Yes    Types: Marijuana       Family Psychiatric History:   Family History   Problem Relation Age of Onset    Anxiety disorder Mother     Depression Mother     Asthma Mother     Crohn's disease Mother     Bipolar disorder Mother     Cervical cancer Mother     Depression Maternal Aunt     Anxiety disorder Maternal Aunt     Anxiety disorder Maternal Uncle     Depression Maternal Grandfather     Hypertension Maternal Grandmother     Alcohol abuse Paternal Grandfather     Breast cancer Family        The following portions of the patient's history " were reviewed and updated as appropriate: allergies, current medications, past family history, past medical history, past social history, past surgical history, and problem list.    Social History     Socioeconomic History    Marital status: Single     Spouse name: Not on file    Number of children: 0    Years of education: Not on file    Highest education level: Some college, no degree   Occupational History    Not on file   Tobacco Use    Smoking status: Never     Passive exposure: Current    Smokeless tobacco: Never    Tobacco comments:     vape daily   Vaping Use    Vaping status: Every Day    Substances: Nicotine, THC, Flavoring   Substance and Sexual Activity    Alcohol use: Yes     Alcohol/week: 2.0 standard drinks of alcohol     Types: 2 Shots of liquor per week     Comment: socially    Drug use: Yes     Types: Marijuana    Sexual activity: Yes     Partners: Male   Other Topics Concern    Not on file   Social History Narrative    12th grade     Social Determinants of Health     Financial Resource Strain: Not on file   Food Insecurity: Not on file   Transportation Needs: Not on file   Physical Activity: Not on file   Stress: Not on file   Social Connections: Not on file   Intimate Partner Violence: Not on file   Housing Stability: Not on file     Social History     Social History Narrative    12th grade       Objective:       Mental status:  Appearance calm and cooperative , adequate hygiene and grooming, and good eye contact    Mood depressed and anxious   Affect affect appropriate    Speech a normal rate   Thought Processes normal thought processes   Hallucinations no hallucinations present    Thought Content no delusions   Abnormal Thoughts no suicidal thoughts  and no homicidal thoughts    Orientation  oriented to person and place and time   Remote Memory short term memory intact and long term memory intact   Attention Span concentration intact   Intellect Appears to be of Average Intelligence   Insight  Insight intact   Judgement judgment was intact   Muscle Strength Muscle strength and tone were normal   Language no difficulty naming common objects   Fund of Knowledge displays adequate knowledge of current events   Pain none   Pain Scale 0       Assessment/Plan:       Diagnoses and all orders for this visit:    Adjustment disorder with depressed mood  -     lamoTRIgine (LaMICtal) 200 MG tablet; Take 1 tablet (200 mg total) by mouth daily    Mood disorder (HCC)    Moderate episode of recurrent major depressive disorder (HCC)    Generalized anxiety disorder            Treatment Recommendations- Risks Benefits      Immediate Medical/Psychiatric/Psychotherapy Treatments and Any Precautions: Continue with treatment plan    Risks, Benefits And Possible Side Effects Of Medications:  {PSYCH RISK, BENEFITS AND POSSIBLE SIDE EFFECTS (Optional):12290    Controlled Medication Discussion: She is aware of safe use and storage of medication    Psychotherapy Provided: 30 minutes individual psychotherapy provided.   Supportive therapy  Medication evaluation  Mood assessment    Normalized and validated her feelings    Goals discussed in session: Maintain stable mood, reduce skin picking

## 2024-05-21 ENCOUNTER — OFFICE VISIT (OUTPATIENT)
Dept: URGENT CARE | Facility: CLINIC | Age: 24
End: 2024-05-21
Payer: COMMERCIAL

## 2024-05-21 VITALS
HEART RATE: 105 BPM | SYSTOLIC BLOOD PRESSURE: 110 MMHG | TEMPERATURE: 99.8 F | HEIGHT: 64 IN | OXYGEN SATURATION: 99 % | BODY MASS INDEX: 39.54 KG/M2 | WEIGHT: 231.6 LBS | DIASTOLIC BLOOD PRESSURE: 70 MMHG | RESPIRATION RATE: 18 BRPM

## 2024-05-21 DIAGNOSIS — F41.1 GAD (GENERALIZED ANXIETY DISORDER): ICD-10-CM

## 2024-05-21 DIAGNOSIS — J02.9 SORE THROAT: Primary | ICD-10-CM

## 2024-05-21 LAB
S PYO AG THROAT QL: NEGATIVE
SARS-COV-2 AG UPPER RESP QL IA: NEGATIVE
VALID CONTROL: NORMAL

## 2024-05-21 PROCEDURE — 99213 OFFICE O/P EST LOW 20 MIN: CPT | Performed by: FAMILY MEDICINE

## 2024-05-21 PROCEDURE — 87880 STREP A ASSAY W/OPTIC: CPT | Performed by: FAMILY MEDICINE

## 2024-05-21 PROCEDURE — 87811 SARS-COV-2 COVID19 W/OPTIC: CPT | Performed by: FAMILY MEDICINE

## 2024-05-21 RX ORDER — ALPRAZOLAM 0.5 MG/1
TABLET ORAL
Qty: 60 TABLET | Refills: 0 | Status: SHIPPED | OUTPATIENT
Start: 2024-05-21

## 2024-05-21 RX ORDER — UREA 10 %
325 LOTION (ML) TOPICAL 2 TIMES DAILY
COMMUNITY

## 2024-05-21 RX ORDER — LIDOCAINE HYDROCHLORIDE 20 MG/ML
15 SOLUTION OROPHARYNGEAL 4 TIMES DAILY PRN
Qty: 100 ML | Refills: 0 | Status: SHIPPED | OUTPATIENT
Start: 2024-05-21

## 2024-05-21 RX ORDER — PENICILLIN V POTASSIUM 500 MG/1
500 TABLET ORAL EVERY 12 HOURS
Qty: 20 TABLET | Refills: 0 | Status: SHIPPED | OUTPATIENT
Start: 2024-05-21 | End: 2024-05-31

## 2024-05-21 NOTE — LETTER
May 21, 2024     Patient: Myriam Smith   YOB: 2000   Date of Visit: 5/21/2024       To Whom It May Concern:    Myriam Smith was evaluated in my office on 5/21/2024.  Please accuse her absence.  May return to work on 5/23/2024 if symptoms are improved.  If you have any questions or concerns, please don't hesitate to call.         Sincerely,        Kelli Weston MD

## 2024-05-21 NOTE — PROGRESS NOTES
"  [facility]        NAME: Myriam Smith is a 24 y.o. female  : 2000    MRN: [id number]  DATE: May 21, 2024  TIME: 5:57 PM    Assessment and Plan   Sore throat [J02.9]  1. Sore throat  POCT rapid ANTIGEN strepA    Poct Covid 19 Rapid Antigen Test    penicillin V potassium (VEETID) 500 mg tablet    Lidocaine Viscous HCl (XYLOCAINE) 2 % mucosal solution        Negative rapid COVID and negative rapid strep.  However unilateral exudative tonsillitis with ipsilateral tender cervical lymphadenopathy raises concern for a strep infection.  Throat culture sent.  Will treat with 10 days of penicillin VK.    Patient Instructions     Follow up with PCP in 3-5 days.  Proceed to  ER if symptoms worsen.    If tests have been performed at [facility], our office will contact you with results if changes need to be made to the care plan discussed with you at the visit.  [facility]    Chief Complaint     Chief Complaint   Patient presents with    Sore Throat     X 2 days - sore throat that \"feels like glass\". And laryngitis, body aches and occ. Nausea. No cough/ear pain. Took Aleve.          History of Present Illness       24-year-old female presents today with sore throat x 2 days associated with generalized myalgias and hoarseness.    Sore Throat   Pertinent negatives include no abdominal pain, congestion, coughing, headaches or shortness of breath.     Review of Systems   Review of Systems   Constitutional:  Positive for diaphoresis. Negative for chills and fever.   HENT:  Positive for sore throat and voice change. Negative for congestion and rhinorrhea.    Respiratory:  Negative for cough and shortness of breath.    Cardiovascular:  Negative for chest pain.   Gastrointestinal:  Negative for abdominal pain and nausea.   Musculoskeletal:  Positive for myalgias.   Neurological:  Negative for dizziness and headaches.     Current Medications       Current Outpatient " Medications:     albuterol (PROVENTIL HFA,VENTOLIN HFA) 90 mcg/act inhaler, Inhale 1-2 puffs as needed for wheezing, Disp: 18 g, Rfl: 0    ALPRAZolam (XANAX) 0.5 mg tablet, TAKE ONE TABLET BY MOUTH TWICE A DAY AS NEEDED FOR ANXIETY (GENERIC FOR XANAX), Disp: 60 tablet, Rfl: 0    Blood Glucose Monitoring Suppl (OneTouch Verio) w/Device KIT, Use to test blood sugar three times a day, Disp: 1 kit, Rfl: 0    DULoxetine (CYMBALTA) 60 mg delayed release capsule, Take 1 capsule (60 mg total) by mouth 2 (two) times a day, Disp: 60 capsule, Rfl: 3    esomeprazole (NexIUM) 20 mg capsule, Take 1 capsule (20 mg total) by mouth daily in the early morning, Disp: 30 capsule, Rfl: 5    ferrous sulfate 325 (65 FE) MG EC tablet, Take 325 mg by mouth 2 (two) times a day, Disp: , Rfl:     glucose blood (OneTouch Verio) test strip, Test blood sugar three times a day, Disp: 300 each, Rfl: 3    lamoTRIgine (LaMICtal) 200 MG tablet, Take 1 tablet (200 mg total) by mouth daily, Disp: 30 tablet, Rfl: 3    Lancets (onetouch ultrasoft) lancets, Test blood sugar three times a day, Disp: 300 each, Rfl: 3    Lidocaine Viscous HCl (XYLOCAINE) 2 % mucosal solution, Swish and spit 15 mL 4 (four) times a day as needed for mouth pain or discomfort, Disp: 100 mL, Rfl: 0    norgestimate-ethinyl estradiol (ORTHO-CYCLEN) 0.25-35 MG-MCG per tablet, TAKE ONE TABLET BY MOUTH EVERY DAY (SKIP PLACEBO PILLS TAKE CONTINUOUS), Disp: 28 tablet, Rfl: 1    penicillin V potassium (VEETID) 500 mg tablet, Take 1 tablet (500 mg total) by mouth every 12 (twelve) hours for 10 days, Disp: 20 tablet, Rfl: 0    Current Allergies     Allergies as of 05/21/2024    (No Known Allergies)            The following portions of the patient's history were reviewed and updated as appropriate: allergies, current medications, past family history, past medical history, past social history, past surgical history and problem list.     Past Medical History:   Diagnosis Date    Anemia      "Anxiety     Asthma     Depression     Head injury     High triglycerides     total triglyceride 187 repeat normal 70    Psychiatric disorder     Type 2 diabetes mellitus with hyperglycemia, without long-term current use of insulin ([facility]) 06/21/2021    Started on metformin  mg bid, followed up by endocrine       Past Surgical History:   Procedure Laterality Date    KNEE ARTHROSCOPY Right 2015    KNEE ARTHROSCOPY Left 2013    KNEE ARTHROSCOPY Left 2017    KNEE SURGERY      UPPER GASTROINTESTINAL ENDOSCOPY         Family History   Problem Relation Age of Onset    Anxiety disorder Mother     Depression Mother     Asthma Mother     Crohn's disease Mother     Bipolar disorder Mother     Cervical cancer Mother     Depression Maternal Aunt     Anxiety disorder Maternal Aunt     Anxiety disorder Maternal Uncle     Depression Maternal Grandfather     Hypertension Maternal Grandmother     Alcohol abuse Paternal Grandfather     Breast cancer Family          Medications have been verified.        Objective   /70   Pulse 105   Temp 99.8 °F (37.7 °C)   Resp 18   Ht 5' 4\" (1.626 m)   Wt 105 kg (231 lb 9.6 oz)   LMP 04/30/2024 (Exact Date)   SpO2 99%   BMI 39.75 kg/m²   Patient's last menstrual period was 04/30/2024 (exact date).       Physical Exam     Physical Exam  Vitals and nursing note reviewed.   Constitutional:       General: She is in acute distress.      Appearance: Normal appearance. She is well-developed. She is not ill-appearing or toxic-appearing.   HENT:      Head: Normocephalic and atraumatic.      Nose: Congestion and rhinorrhea present.      Comments: Inflamed nasal mucosa     Mouth/Throat:      Mouth: Mucous membranes are moist.      Pharynx: Uvula midline. Posterior oropharyngeal erythema present.      Tonsils: Tonsillar exudate present. 2+ on the right. 1+ on the left.   Eyes:      General:         Right eye: No discharge.         Left eye: No discharge.      Conjunctiva/sclera: Conjunctivae " normal.   Cardiovascular:      Rate and Rhythm: Normal rate and regular rhythm.   Pulmonary:      Effort: Pulmonary effort is normal. No respiratory distress.      Breath sounds: Normal breath sounds. No wheezing, rhonchi or rales.   Lymphadenopathy:      Cervical: Cervical adenopathy (Tender) present.   Skin:     General: Skin is warm.      Findings: No erythema.   Neurological:      General: No focal deficit present.      Mental Status: She is alert and oriented to person, place, and time.   Psychiatric:         Mood and Affect: Mood normal.         Behavior: Behavior normal.         Thought Content: Thought content normal.         Judgment: Judgment normal.

## 2024-05-24 LAB
BACTERIA SPEC RESP CULT: NORMAL
Lab: NORMAL

## 2024-05-30 DIAGNOSIS — N92.0 MENORRHAGIA WITH REGULAR CYCLE: ICD-10-CM

## 2024-05-30 DIAGNOSIS — N94.6 DYSMENORRHEA IN ADOLESCENT: ICD-10-CM

## 2024-05-31 RX ORDER — NORGESTIMATE AND ETHINYL ESTRADIOL 0.25-0.035
KIT ORAL
Qty: 28 TABLET | Refills: 1 | Status: SHIPPED | OUTPATIENT
Start: 2024-05-31 | End: 2024-06-05 | Stop reason: SDUPTHER

## 2024-06-05 ENCOUNTER — ANNUAL EXAM (OUTPATIENT)
Dept: OBGYN CLINIC | Facility: CLINIC | Age: 24
End: 2024-06-05
Payer: COMMERCIAL

## 2024-06-05 VITALS — BODY MASS INDEX: 40.51 KG/M2 | SYSTOLIC BLOOD PRESSURE: 120 MMHG | WEIGHT: 236 LBS | DIASTOLIC BLOOD PRESSURE: 80 MMHG

## 2024-06-05 DIAGNOSIS — Z11.3 SCREENING EXAMINATION FOR STD (SEXUALLY TRANSMITTED DISEASE): ICD-10-CM

## 2024-06-05 DIAGNOSIS — N94.6 DYSMENORRHEA IN ADOLESCENT: ICD-10-CM

## 2024-06-05 DIAGNOSIS — N92.0 MENORRHAGIA WITH REGULAR CYCLE: ICD-10-CM

## 2024-06-05 DIAGNOSIS — Z84.81 FAMILY HISTORY OF BRCA GENE POSITIVE: ICD-10-CM

## 2024-06-05 DIAGNOSIS — Z01.419 WOMEN'S ANNUAL ROUTINE GYNECOLOGICAL EXAMINATION: Primary | ICD-10-CM

## 2024-06-05 PROCEDURE — 87491 CHLMYD TRACH DNA AMP PROBE: CPT | Performed by: NURSE PRACTITIONER

## 2024-06-05 PROCEDURE — 87591 N.GONORRHOEAE DNA AMP PROB: CPT | Performed by: NURSE PRACTITIONER

## 2024-06-05 PROCEDURE — 99395 PREV VISIT EST AGE 18-39: CPT | Performed by: NURSE PRACTITIONER

## 2024-06-05 PROCEDURE — G0145 SCR C/V CYTO,THINLAYER,RESCR: HCPCS | Performed by: NURSE PRACTITIONER

## 2024-06-05 PROCEDURE — 87661 TRICHOMONAS VAGINALIS AMPLIF: CPT | Performed by: NURSE PRACTITIONER

## 2024-06-05 RX ORDER — NORGESTIMATE AND ETHINYL ESTRADIOL 0.25-0.035
KIT ORAL
Qty: 84 TABLET | Refills: 3 | Status: SHIPPED | OUTPATIENT
Start: 2024-06-05

## 2024-06-05 NOTE — PROGRESS NOTES
Subjective    HPI:     Myriam Smith is a 24 y.o. nulliparous female. Her menstrual cycles are regular and predictable. Her current method of contraception includes  OCP . She denies issues with intimacy. She denies /GI and Gyn complaints. She feels safe at home. She  states her  depression/anxiety is stable on her current medication.  Medical, surgical and family history reviewed. Mother is BRCA +, unsure if is 1 or 2, or both. Will update record. Her dental care is not done. She eats a healthy diet. Does not exercise regularly. She is unhappy with her weight.     Visit Vitals  /80   Wt 107 kg (236 lb)   LMP 2024 (Exact Date)   BMI 40.51 kg/m²   OB Status Having periods   Smoking Status Never   BSA 2.1 m²       Gynecologic History    Patient's last menstrual period was 2024 (exact date).  Gardasil Vaccine Series: complete HPV    Obstetric and Medical History    OB History    Para Term  AB Living   0 0 0 0 0 0   SAB IAB Ectopic Multiple Live Births   0 0 0 0 0       Past Medical History:   Diagnosis Date    Anemia     Anxiety     Asthma     Depression     Head injury     High triglycerides     total triglyceride 187 repeat normal 70    Psychiatric disorder     Type 2 diabetes mellitus with hyperglycemia, without long-term current use of insulin ([facility]) 2021    Started on metformin  mg bid, followed up by endocrine       Past Surgical History:   Procedure Laterality Date    KNEE ARTHROSCOPY Right 2015    KNEE ARTHROSCOPY Left 2013    KNEE ARTHROSCOPY Left 2017    KNEE SURGERY      UPPER GASTROINTESTINAL ENDOSCOPY         The following portions of the patient's history were reviewed and updated as appropriate: allergies, current medications, past family history, past medical history, past social history, past surgical history, and problem list.    Review of Systems    Pertinent items are noted in HPI.      Objective    Physical Exam  Constitutional:       Appearance:  Normal appearance. She is well-developed.   Genitourinary:      Vulva, bladder and urethral meatus normal.      No lesions in the vagina.      Right Labia: No rash, tenderness, lesions, skin changes or Bartholin's cyst.     Left Labia: No tenderness, lesions, skin changes, Bartholin's cyst or rash.     No labial fusion noted.      No inguinal adenopathy present in the right or left side.     No vaginal discharge, erythema, tenderness, bleeding or granulation tissue.      No vaginal prolapse present.     No vaginal atrophy present.       Right Adnexa: not tender, not full and no mass present.     Left Adnexa: not tender, not full and no mass present.     Cervix is nulliparous.      No cervical motion tenderness, discharge, friability, lesion, polyp or nabothian cyst.      Uterus is not enlarged, tender, irregular or prolapsed.      No uterine mass detected.     Uterus is anteverted.      Pelvic exam was performed with patient in the lithotomy position.   Breasts:     Breasts are symmetrical.      Right: No inverted nipple, mass, nipple discharge, skin change or tenderness.      Left: No inverted nipple, mass, nipple discharge, skin change or tenderness.   HENT:      Head: Normocephalic and atraumatic.   Neck:      Thyroid: No thyromegaly.   Cardiovascular:      Rate and Rhythm: Normal rate and regular rhythm.      Heart sounds: Normal heart sounds, S1 normal and S2 normal.   Pulmonary:      Effort: Pulmonary effort is normal.      Breath sounds: Normal breath sounds.   Abdominal:      General: Bowel sounds are normal. There is no distension.      Palpations: Abdomen is soft. There is no mass.      Tenderness: There is no abdominal tenderness. There is no guarding.      Hernia: There is no hernia in the left inguinal area or right inguinal area.   Musculoskeletal:      Cervical back: Neck supple.   Lymphadenopathy:      Cervical: No cervical adenopathy.      Upper Body:      Right upper body: No supraclavicular or  axillary adenopathy.      Left upper body: No supraclavicular or axillary adenopathy.      Lower Body: No right inguinal adenopathy. No left inguinal adenopathy.   Neurological:      Mental Status: She is alert.   Skin:     General: Skin is warm and dry.      Findings: No rash.   Psychiatric:         Attention and Perception: Attention and perception normal.         Mood and Affect: Mood and affect normal.         Speech: Speech normal.         Behavior: Behavior is cooperative.         Thought Content: Thought content normal.         Cognition and Memory: Cognition and memory normal.         Judgment: Judgment normal.   Vitals and nursing note reviewed.          Assessment and Plan    Myriam was seen today for gynecologic exam.    Diagnoses and all orders for this visit:    Women's annual routine gynecological examination  -     Liquid-based pap, screening    Screening examination for STD (sexually transmitted disease)  -     Cancel: Chlamydia/GC amplified DNA by PCR  -     Trichomonas vaginalis Thin prep  -     Chlamydia/GC amplified DNA by PCR    Dysmenorrhea in adolescent  -     norgestimate-ethinyl estradiol (ORTHO-CYCLEN) 0.25-35 MG-MCG per tablet; TAKE ONE TABLET BY MOUTH EVERY DAY    Menorrhagia with regular cycle  -     norgestimate-ethinyl estradiol (ORTHO-CYCLEN) 0.25-35 MG-MCG per tablet; TAKE ONE TABLET BY MOUTH EVERY DAY    Family history of BRCA gene positive  -     Ambulatory Referral to Genetics; Future        Patient informed of a Stable GYN exam. A pap smear was performed.     I have discussed the importance of exercise and healthy diet as well as adequate intake of calcium and vitamin D. The current ASCCP guidelines were reviewed. The low risk patient will receive pap smear screening every 3 years until the age of 29 and then every 3 to 5 years with HPV co-testing from the ages of 30-65. I emphasized the importance of an annual pelvic and breast exam. A yearly mammogram is recommended for  breast cancer screening starting at age 40.       Results will be released to [facility], if abnormal will call to review and discuss treatment plan.     All questions have been answered to her satisfaction.       Education reviewed: safe sex/STD prevention, self breast exams, and importance of routine dental cleanings.  Contraception: OCP (estrogen/progesterone).  Follow up in: 1 year or sooner if needed.

## 2024-06-07 ENCOUNTER — TELEPHONE (OUTPATIENT)
Dept: HEMATOLOGY ONCOLOGY | Facility: CLINIC | Age: 24
End: 2024-06-07

## 2024-06-07 LAB — T VAGINALIS DNA SPEC QL NAA+PROBE: NEGATIVE

## 2024-06-07 NOTE — TELEPHONE ENCOUNTER
I called Myriam in response to a referral that was received for patient to establish care with [facility].     Outreach was made to schedule a consultation.    I left a voicemail explaining the reason for my call and advised patient to call [facility] at [phone].  The referral has been closed.

## 2024-06-10 LAB
C TRACH DNA SPEC QL NAA+PROBE: NEGATIVE
N GONORRHOEA DNA SPEC QL NAA+PROBE: NEGATIVE

## 2024-06-13 LAB
LAB AP GYN PRIMARY INTERPRETATION: NORMAL
Lab: NORMAL

## 2024-06-20 DIAGNOSIS — F41.1 GAD (GENERALIZED ANXIETY DISORDER): ICD-10-CM

## 2024-06-20 RX ORDER — ALPRAZOLAM 0.5 MG/1
0.5 TABLET ORAL 2 TIMES DAILY PRN
Qty: 60 TABLET | Refills: 0 | Status: SHIPPED | OUTPATIENT
Start: 2024-06-20

## 2024-07-18 DIAGNOSIS — F41.1 GAD (GENERALIZED ANXIETY DISORDER): ICD-10-CM

## 2024-07-18 RX ORDER — ALPRAZOLAM 0.5 MG/1
TABLET ORAL
Qty: 60 TABLET | Refills: 0 | Status: SHIPPED | OUTPATIENT
Start: 2024-07-18

## 2024-07-19 ENCOUNTER — OFFICE VISIT (OUTPATIENT)
Dept: FAMILY MEDICINE CLINIC | Facility: CLINIC | Age: 24
End: 2024-07-19
Payer: COMMERCIAL

## 2024-07-19 ENCOUNTER — APPOINTMENT (OUTPATIENT)
Dept: LAB | Facility: HOSPITAL | Age: 24
End: 2024-07-19
Payer: COMMERCIAL

## 2024-07-19 VITALS
BODY MASS INDEX: 40.8 KG/M2 | WEIGHT: 239 LBS | DIASTOLIC BLOOD PRESSURE: 70 MMHG | SYSTOLIC BLOOD PRESSURE: 118 MMHG | HEART RATE: 84 BPM | RESPIRATION RATE: 16 BRPM | TEMPERATURE: 96.9 F | HEIGHT: 64 IN

## 2024-07-19 DIAGNOSIS — F39 MOOD DISORDER (HCC): ICD-10-CM

## 2024-07-19 DIAGNOSIS — D50.8 IRON DEFICIENCY ANEMIA SECONDARY TO INADEQUATE DIETARY IRON INTAKE: Primary | ICD-10-CM

## 2024-07-19 DIAGNOSIS — D50.8 IRON DEFICIENCY ANEMIA SECONDARY TO INADEQUATE DIETARY IRON INTAKE: ICD-10-CM

## 2024-07-19 DIAGNOSIS — G44.209 TENSION HEADACHE: ICD-10-CM

## 2024-07-19 LAB
FERRITIN SERPL-MCNC: 18 NG/ML (ref 11–307)
IRON SATN MFR SERPL: 15 % (ref 15–50)
IRON SERPL-MCNC: 59 UG/DL (ref 50–212)
TIBC SERPL-MCNC: 401 UG/DL (ref 250–450)
UIBC SERPL-MCNC: 342 UG/DL (ref 155–355)

## 2024-07-19 PROCEDURE — 82728 ASSAY OF FERRITIN: CPT

## 2024-07-19 PROCEDURE — 36415 COLL VENOUS BLD VENIPUNCTURE: CPT

## 2024-07-19 PROCEDURE — 83550 IRON BINDING TEST: CPT

## 2024-07-19 PROCEDURE — 83540 ASSAY OF IRON: CPT

## 2024-07-19 PROCEDURE — 99214 OFFICE O/P EST MOD 30 MIN: CPT | Performed by: NURSE PRACTITIONER

## 2024-07-19 RX ORDER — BUTALBITAL, ACETAMINOPHEN AND CAFFEINE 300; 40; 50 MG/1; MG/1; MG/1
1 CAPSULE ORAL DAILY PRN
Qty: 20 CAPSULE | Refills: 1 | Status: SHIPPED | OUTPATIENT
Start: 2024-07-19

## 2024-07-19 NOTE — PROGRESS NOTES
"Ambulatory Visit  Name: Myriam Smith      : 2000      MRN: [id number]  Encounter Provider: RAOUL Haddad  Encounter Date: 2024   Encounter department: [facility]    Assessment & Plan   1. Iron deficiency anemia secondary to inadequate dietary iron intake  Assessment & Plan:  CBC reviewed, hemoglobin significantly improved.  Iron panel is pending.  Will follow-up with patient once this is resulted  2. Tension headache  Assessment & Plan:  Discussed treatment including alternative abortive medications, preventative medications, and addressing neck tension with physical therapy.  She is concerned about the co-pay for physical therapy.  Will start on Fioricet as needed, and we will reassess for potential need for preventative medication in the future  Orders:  -     Butalbital-APAP-Caffeine (Fioricet) -40 MG CAPS; Take 1 capsule by mouth daily as needed (headache)  3. Mood disorder (HCC)  Assessment & Plan:  Management per psychiatry     History of Present Illness     Here today for follow-up.  She has been keeping a log of her headaches, and still has not been able to identify any patterns or triggers.  She also reports neck pain and low back pain with associated right-sided sciatica.  She occasionally takes NSAIDs which debilitating.        Review of Systems   Constitutional: Negative.    Musculoskeletal:  Positive for back pain and neck pain.   Neurological:  Positive for headaches.   Psychiatric/Behavioral:          See HPI       Objective     /70   Pulse 84   Temp (!) 96.9 °F (36.1 °C)   Resp 16   Ht 5' 4\" (1.626 m)   Wt 108 kg (239 lb)   LMP 2024 (Exact Date)   BMI 41.02 kg/m²     Physical Exam  Vitals and nursing note reviewed.   Constitutional:       General: She is not in acute distress.     Appearance: Normal appearance.   HENT:      Head: Normocephalic and atraumatic.   Eyes:      Conjunctiva/sclera: Conjunctivae normal.   Neck:      " Vascular: No carotid bruit.   Cardiovascular:      Rate and Rhythm: Normal rate and regular rhythm.      Pulses: Normal pulses.      Heart sounds: Normal heart sounds. No murmur heard.  Pulmonary:      Effort: Pulmonary effort is normal.      Breath sounds: Normal breath sounds.   Skin:     General: Skin is warm and dry.   Neurological:      Mental Status: She is alert.   Psychiatric:         Mood and Affect: Mood normal.         Behavior: Behavior normal.       Administrative Statements

## 2024-07-19 NOTE — ASSESSMENT & PLAN NOTE
Discussed treatment including alternative abortive medications, preventative medications, and addressing neck tension with physical therapy.  She is concerned about the co-pay for physical therapy.  Will start on Fioricet as needed, and we will reassess for potential need for preventative medication in the future

## 2024-07-19 NOTE — ASSESSMENT & PLAN NOTE
CBC reviewed, hemoglobin significantly improved.  Iron panel is pending.  Will follow-up with patient once this is resulted

## 2024-08-22 DIAGNOSIS — F41.1 GAD (GENERALIZED ANXIETY DISORDER): ICD-10-CM

## 2024-08-22 RX ORDER — ALPRAZOLAM 0.5 MG
TABLET ORAL
Qty: 60 TABLET | Refills: 0 | Status: SHIPPED | OUTPATIENT
Start: 2024-08-22

## 2024-08-30 DIAGNOSIS — F43.21 ADJUSTMENT DISORDER WITH DEPRESSED MOOD: ICD-10-CM

## 2024-08-30 DIAGNOSIS — K21.9 GASTROESOPHAGEAL REFLUX DISEASE, UNSPECIFIED WHETHER ESOPHAGITIS PRESENT: ICD-10-CM

## 2024-08-30 RX ORDER — LAMOTRIGINE 200 MG/1
TABLET ORAL
Qty: 30 TABLET | Refills: 2 | Status: SHIPPED | OUTPATIENT
Start: 2024-08-30

## 2024-11-11 ENCOUNTER — OFFICE VISIT (OUTPATIENT)
Dept: PSYCHIATRY | Facility: CLINIC | Age: 24
End: 2024-11-11
Payer: COMMERCIAL

## 2024-11-11 VITALS
HEIGHT: 64 IN | HEART RATE: 85 BPM | DIASTOLIC BLOOD PRESSURE: 81 MMHG | WEIGHT: 248 LBS | SYSTOLIC BLOOD PRESSURE: 115 MMHG | BODY MASS INDEX: 42.34 KG/M2

## 2024-11-11 DIAGNOSIS — F41.1 GAD (GENERALIZED ANXIETY DISORDER): ICD-10-CM

## 2024-11-11 DIAGNOSIS — F41.1 GENERALIZED ANXIETY DISORDER: ICD-10-CM

## 2024-11-11 DIAGNOSIS — F39 MOOD DISORDER (HCC): Primary | ICD-10-CM

## 2024-11-11 DIAGNOSIS — F33.1 MODERATE EPISODE OF RECURRENT MAJOR DEPRESSIVE DISORDER (HCC): ICD-10-CM

## 2024-11-11 PROCEDURE — 90833 PSYTX W PT W E/M 30 MIN: CPT | Performed by: NURSE PRACTITIONER

## 2024-11-11 PROCEDURE — 99214 OFFICE O/P EST MOD 30 MIN: CPT | Performed by: NURSE PRACTITIONER

## 2024-11-11 RX ORDER — ALPRAZOLAM 0.5 MG
0.5 TABLET ORAL 2 TIMES DAILY PRN
Qty: 60 TABLET | Refills: 0 | Status: SHIPPED | OUTPATIENT
Start: 2024-11-11

## 2024-11-11 RX ORDER — DULOXETIN HYDROCHLORIDE 60 MG/1
60 CAPSULE, DELAYED RELEASE ORAL DAILY
Qty: 30 CAPSULE | Refills: 3 | Status: SHIPPED | OUTPATIENT
Start: 2024-11-11

## 2024-11-11 RX ORDER — OXYCODONE AND ACETAMINOPHEN 5; 325 MG/1; MG/1
1 TABLET ORAL
COMMUNITY
Start: 2024-09-20 | End: 2024-11-11

## 2024-11-11 RX ORDER — LURASIDONE HYDROCHLORIDE 20 MG/1
20 TABLET, FILM COATED ORAL
Qty: 30 TABLET | Refills: 3 | Status: SHIPPED | OUTPATIENT
Start: 2024-11-11

## 2024-11-11 NOTE — ASSESSMENT & PLAN NOTE
Duloxetine is taken 60 mg daily which helps with depression  Reports takes medication without problems and has supportive family.  Financial stressors close depression and will return to work in January due to knee surgery  Support provided    Orders:    DULoxetine (CYMBALTA) 60 mg delayed release capsule; Take 1 capsule (60 mg total) by mouth daily

## 2024-11-11 NOTE — PATIENT INSTRUCTIONS
Continue medications  Call with problems or concerns  Start Latuda 20 mg with dinner call with your response

## 2024-11-11 NOTE — ASSESSMENT & PLAN NOTE
Lamictal 200 mg tablet taken daily  Latuda 20 mg tablets started today instructed to take with food side effects were explained ordered because she reports fluctuation in mood and has episodes of depression that last approximately 3 days.  Denies suicidal ideation or manic episodes.  Has a boyfriend who is supportive she is out of work because of knee surgery which is frustrating.  Reports appetite is good and she is sleeping    Orders:    lurasidone (Latuda) 20 mg tablet; Take 1 tablet (20 mg total) by mouth daily with dinner

## 2024-11-11 NOTE — PSYCH
Visit Time    Visit Start Time: 830  Visit Stop Time: 9:00  Total Visit Duration:  30 minutes minutes    Subjective:     Patient ID: Myriam Smith is a 24 y.o. female.  History of mood disorder anxiety and depression seen for medication management and mood assessment  Assessment & Plan  Mood disorder (HCC)  Lamictal 200 mg tablet taken daily  Latuda 20 mg tablets started today instructed to take with food side effects were explained ordered because she reports fluctuation in mood and has episodes of depression that last approximately 3 days.  Denies suicidal ideation or manic episodes.  Has a boyfriend who is supportive she is out of work because of knee surgery which is frustrating.  Reports appetite is good and she is sleeping    Orders:    lurasidone (Latuda) 20 mg tablet; Take 1 tablet (20 mg total) by mouth daily with dinner    Moderate episode of recurrent major depressive disorder (HCC)  Duloxetine is taken 60 mg daily which helps with depression  Reports takes medication without problems and has supportive family.  Financial stressors close depression and will return to work in January due to knee surgery  Support provided    Orders:    DULoxetine (CYMBALTA) 60 mg delayed release capsule; Take 1 capsule (60 mg total) by mouth daily    Generalized anxiety disorder  Xanax 0.5 mg tablet twice daily as needed anxiety.  She reports she takes Xanax as prescribed and it is effective for her anxiety.  Denies panic attacks and is stressed due to financial pressures and being on disability support was provided    Orders:    DULoxetine (CYMBALTA) 60 mg delayed release capsule; Take 1 capsule (60 mg total) by mouth daily    LUZMARIA (generalized anxiety disorder)    Orders:    ALPRAZolam (XANAX) 0.5 mg tablet; Take 1 tablet (0.5 mg total) by mouth 2 (two) times a day as needed for anxiety       HPI ROS Appetite Changes and Sleep: normal appetite and normal energy level    Review Of Systems:     Mood Anxiety and Depression    Behavior Normal    Thought Content Normal   General Emotional Problems   Personality Normal   Other Psych Symptoms Normal   Constitutional Normal   ENT As Noted in HPI   Cardiovascular As Noted in HPI   Respiratory As Noted in HPI   Gastrointestinal As Noted in HPI   Genitourinary As Noted in HPI   Musculoskeletal As Noted in HPI   Integumentary As Noted in HPI   Neurological Normal    Endocrine Normal    Other Symptoms Normal        Laboratory Results:     Substance Abuse History:  Social History     Substance and Sexual Activity   Drug Use Yes    Types: Marijuana       Family Psychiatric History:   Family History   Problem Relation Age of Onset    Anxiety disorder Mother     Depression Mother     Asthma Mother     Crohn's disease Mother     Bipolar disorder Mother     Cervical cancer Mother     Hypertension Maternal Grandmother     Depression Maternal Grandfather     Alcohol abuse Paternal Grandfather     Depression Maternal Aunt     Anxiety disorder Maternal Aunt     Anxiety disorder Maternal Uncle     Breast cancer Family        The following portions of the patient's history were reviewed and updated as appropriate: allergies, current medications, past family history, past medical history, past social history, past surgical history, and problem list.    Social History     Socioeconomic History    Marital status: Single     Spouse name: Not on file    Number of children: 0    Years of education: Not on file    Highest education level: Some college, no degree   Occupational History    Not on file   Tobacco Use    Smoking status: Never     Passive exposure: Current    Smokeless tobacco: Never    Tobacco comments:     vape daily   Vaping Use    Vaping status: Every Day    Substances: Nicotine, THC, Flavoring   Substance and Sexual Activity    Alcohol use: Yes     Alcohol/week: 2.0 standard drinks of alcohol     Types: 2 Shots of liquor per week     Comment: socially    Drug use: Yes     Types: Marijuana    Sexual  activity: Yes     Partners: Male     Birth control/protection: OCP   Other Topics Concern    Not on file   Social History Narrative    12th grade     Social Determinants of Health     Financial Resource Strain: Not on file   Food Insecurity: Not on file   Transportation Needs: Not on file   Physical Activity: Not on file   Stress: Not on file   Social Connections: Not on file   Intimate Partner Violence: Not on file   Housing Stability: Not on file     Social History     Social History Narrative    12th grade       Objective:       Mental status:  Appearance calm and cooperative , adequate hygiene and grooming, and good eye contact    Mood anxious   Affect affect appropriate    Speech a normal rate   Thought Processes normal thought processes   Hallucinations no hallucinations present    Thought Content no delusions   Abnormal Thoughts no suicidal thoughts  and no homicidal thoughts    Orientation  oriented to person and place and time   Remote Memory short term memory intact and long term memory intact   Attention Span concentration intact   Intellect Appears to be of Average Intelligence   Insight Insight intact   Judgement judgment was intact   Muscle Strength Muscle strength and tone were normal   Language no difficulty naming common objects   Fund of Knowledge displays adequate knowledge of current events   Pain moderate to severe   Pain Scale 5       Assessment/Plan:       Diagnoses and all orders for this visit:    Mood disorder (HCC)    Moderate episode of recurrent major depressive disorder (HCC)    Generalized anxiety disorder    Other orders  -     oxyCODONE-acetaminophen (PERCOCET) 5-325 mg per tablet; Take 1 tablet by mouth (Patient not taking: Reported on 11/11/2024)            Treatment Recommendations- Risks Benefits      Immediate Medical/Psychiatric/Psychotherapy Treatments and Any Precautions: Continue treatment plan start Latuda 20 mg    Risks, Benefits And Possible Side Effects Of Medications:   "{PSYCH RISK, BENEFITS AND POSSIBLE SIDE EFFECTS (Optional):63846    Controlled Medication Discussion: She is aware of safe use and storage of medication    Psychotherapy Provided: 30 minutes individual psychotherapy provided.   Supportive therapy  Medication evaluation  Mood assessment  Treatment plan updated  Normalized and validated her feelings  Goals discussed in session: Maintain stable mood    \"Portions of the record may have been created with voice recognition software. Occasional wrong word or \"sound a like\" substitutions may have occurred due to the inherent limitations of voice recognition software. Read the chart carefully and recognize, using context, where substitutions have occurred. Please call if you have any questions. \"                                   "

## 2024-11-11 NOTE — BH TREATMENT PLAN
TREATMENT PLAN (Medication Management Only)         [facility]     Name and Date of Birth:  Myriam Smith 24 y.o. 2000  Date of Treatment Plan: July 6, 2020, updated 10/5/2020, 5/10/2021, 11/9/2021, 5/23/2022, 1/16/2023, 8/7/2023, 2/14/2024, 11/11/2024  Diagnosis/Diagnoses:    1. Persistent mood (affective) disorder, unspecified (HCC)    2. Anxiety disorder, unspecified type       Strengths/Personal Resources for Self-Care: supportive family, supportive friends, taking medications as prescribed.  Area/Areas of need (in own words): anxiety, depression  1.         Long Term Goal: maintain mood stability.  Target Date: 6 months -5/11/2025  Person/Persons responsible for completion of goal: Myriam  2.         Short Term Objective (s) - How will we reach this goal?:   A. Provider new recommended medication/dosage changes and/or continue medication(s): continue current medications as prescribed.  B. Reduce vaping.  C. take medication as prescribed, therapy.  Target Date: 6 months -5/11/2025  Person/Persons Responsible for Completion of Goal: Myriam  Progress Towards Goals: continuing treatment  Treatment Modality: medication management every 3 months  Review due 180 days from date of this plan: 6 months -5/11/2025Expected length of service: ongoing treatment  My Physician/PA/NP and I have developed this plan together and I agree to work on the goals and objectives. I understand the treatment goals that were developed for my treatment

## 2024-11-11 NOTE — ASSESSMENT & PLAN NOTE
Xanax 0.5 mg tablet twice daily as needed anxiety.  She reports she takes Xanax as prescribed and it is effective for her anxiety.  Denies panic attacks and is stressed due to financial pressures and being on disability support was provided    Orders:    DULoxetine (CYMBALTA) 60 mg delayed release capsule; Take 1 capsule (60 mg total) by mouth daily

## 2024-11-29 DIAGNOSIS — F43.21 ADJUSTMENT DISORDER WITH DEPRESSED MOOD: ICD-10-CM

## 2024-11-29 RX ORDER — LAMOTRIGINE 200 MG/1
TABLET ORAL
Qty: 30 TABLET | Refills: 2 | Status: SHIPPED | OUTPATIENT
Start: 2024-11-29

## 2025-01-14 ENCOUNTER — OFFICE VISIT (OUTPATIENT)
Dept: PSYCHIATRY | Facility: CLINIC | Age: 25
End: 2025-01-14
Payer: COMMERCIAL

## 2025-01-14 VITALS
SYSTOLIC BLOOD PRESSURE: 113 MMHG | BODY MASS INDEX: 43.71 KG/M2 | DIASTOLIC BLOOD PRESSURE: 84 MMHG | WEIGHT: 256 LBS | HEART RATE: 75 BPM | HEIGHT: 64 IN

## 2025-01-14 DIAGNOSIS — F33.1 MODERATE EPISODE OF RECURRENT MAJOR DEPRESSIVE DISORDER (HCC): Primary | ICD-10-CM

## 2025-01-14 DIAGNOSIS — F41.1 GENERALIZED ANXIETY DISORDER: ICD-10-CM

## 2025-01-14 DIAGNOSIS — F32.2 SEVERE MAJOR DEPRESSIVE DISORDER (HCC): ICD-10-CM

## 2025-01-14 DIAGNOSIS — F41.1 GAD (GENERALIZED ANXIETY DISORDER): ICD-10-CM

## 2025-01-14 PROCEDURE — 99214 OFFICE O/P EST MOD 30 MIN: CPT | Performed by: NURSE PRACTITIONER

## 2025-01-14 PROCEDURE — 90833 PSYTX W PT W E/M 30 MIN: CPT | Performed by: NURSE PRACTITIONER

## 2025-01-14 RX ORDER — ALPRAZOLAM 0.5 MG
0.5 TABLET ORAL 2 TIMES DAILY PRN
Qty: 60 TABLET | Refills: 0 | Status: SHIPPED | OUTPATIENT
Start: 2025-01-14

## 2025-01-14 NOTE — ASSESSMENT & PLAN NOTE
Cymbalta 60 mg daily    Myriam reports feeling overwhelmed, knee is hurting, working part time and can't find another job. Doesn't know if she can go back to school and work also. Cat  and she misses it. Mother went to work and now wants her to do more around the house. Denies Si, Safety plan reviewed and updated. Appetite is good and she is sleeping. Reports Latuda makes her irritable and it was discontinued. Support was provided. BF is supportive.

## 2025-01-14 NOTE — BH CRISIS PLAN
Client Name: Myriam Smith       Client YOB: 2000    [facility] Safety Plan      Creation Date: 2/14/24 Update Date: 1/14/25   Created By: Brianna Fay, PhD Last Updated By: Brianna Fay, PhD      Step 1: Warning Signs:   Warning Signs   heart beating fast, overwhelming feeling going to explode, shakey            Step 2: Internal Coping Strategies:   Internal Coping Strategies   deep breaths            Step 3: People and social settings that provide distraction:   Name Contact Information   talk to friends cell   mother cell    Places   none           Step 4: People whom I can ask for help during a crisis:      Name Contact Information    friends cell    aunt cell    grandparents cell    mother cell      Step 5: Professionals or agencies I can contact during a crisis:      [facility]inican/Agency Name Phone Emergency Contact    [facility] [phone] Dr. Fay      [facility] Emergency Department Emergency Department Phone Emergency Department Address    [facility]        Crisis Phone Numbers:   Suicide Prevention Lifeline: Call or Text  896 Crisis Text Line: Text HOME to 080-286   Please note: Some [facility] do not have a separate number for Child/Adolescent specific crisis. If your county is not listed under Child/Adolescent, please call the adult number for your county      Adult Crisis Numbers: Child/Adolescent Crisis Numbers   [facility]: [phone] [facility]: [phone]   [facility]: [phone] [facility]: [phone]   [facility]: [phone] [place]: [phone]   [facility]: [phone] [place]: [phone]   [place]: [phone]   [facility]: [phone]   [facility]: [phone]   [facility]: [phone] (daytime) [phone] (after hours, weekends, holidays)      Step 6: Making the environment safer (plan for lethal means safety):   Plan: Guns are  locked unloaded     Optional: What is most important to me and worth living for?   family     [facility] Safety Plan. Annika Cordero and Dmitriy Patton. Used with permission of the authors.

## 2025-01-14 NOTE — ASSESSMENT & PLAN NOTE
Xanax is helpful for anxiety and panic. Takes medication every other day or longer. Reports she is coping. Discussed creating small attainable goals and she will think about them. Discussed coping skills and not doing or expecting multiple tasks to be completed right away. Takes medication as prescribed.

## 2025-01-14 NOTE — PSYCH
Visit Time    Visit Start Time: 10:00  Visit Stop Time: 10:30  Total Visit Duration:  30 minutes    Subjective:     Patient ID: Myriam Smith is a 24 y.o. female.  History of bipolar disorder, anxiety, depression seen for medication management and mood assessment  Assessment & Plan  Moderate episode of recurrent major depressive disorder (HCC)         Generalized anxiety disorder  Xanax is helpful for anxiety and panic. Takes medication every other day or longer. Reports she is coping. Discussed creating small attainable goals and she will think about them. Discussed coping skills and not doing or expecting multiple tasks to be completed right away. Takes medication as prescribed.         LUZMARIA (generalized anxiety disorder)    Orders:    ALPRAZolam (XANAX) 0.5 mg tablet; Take 1 tablet (0.5 mg total) by mouth 2 (two) times a day as needed for anxiety    Severe major depressive disorder (HCC)  Cymbalta 60 mg daily    Myriam reports feeling overwhelmed, knee is hurting, working part time and can't find another job. Doesn't know if she can go back to school and work also. Cat  and she misses it. Mother went to work and now wants her to do more around the house. Denies Si, Safety plan reviewed and updated. Appetite is good and she is sleeping. Reports Latuda makes her irritable and it was discontinued. Support was provided. BF is supportive.            HPI ROS Appetite Changes and Sleep: normal appetite and decreased energy    Review Of Systems:     Mood Anxiety and Depression   Behavior Normal    Thought Content Normal   General Emotional Problems   Personality Normal   Other Psych Symptoms Normal   Constitutional As Noted in HPI   ENT As Noted in HPI   Cardiovascular As Noted in HPI   Respiratory As Noted in HPI   Gastrointestinal As Noted in HPI   Genitourinary As Noted in HPI   Musculoskeletal As Noted in HPI   Integumentary As Noted in HPI   Neurological As Noted in HPI   Endocrine Normal    Other Symptoms  Normal        Laboratory Results:     Substance Abuse History:  Social History     Substance and Sexual Activity   Drug Use Yes    Types: Marijuana       Family Psychiatric History:   Family History   Problem Relation Age of Onset    Anxiety disorder Mother     Depression Mother     Asthma Mother     Crohn's disease Mother     Bipolar disorder Mother     Cervical cancer Mother     Hypertension Maternal Grandmother     Depression Maternal Grandfather     Alcohol abuse Paternal Grandfather     Depression Maternal Aunt     Anxiety disorder Maternal Aunt     Anxiety disorder Maternal Uncle     Breast cancer Family        The following portions of the patient's history were reviewed and updated as appropriate: allergies, current medications, past family history, past medical history, past social history, past surgical history, and problem list.    Social History     Socioeconomic History    Marital status: Single     Spouse name: Not on file    Number of children: 0    Years of education: Not on file    Highest education level: Some college, no degree   Occupational History    Not on file   Tobacco Use    Smoking status: Never     Passive exposure: Current    Smokeless tobacco: Never    Tobacco comments:     vape daily   Vaping Use    Vaping status: Every Day    Substances: Nicotine, THC, Flavoring   Substance and Sexual Activity    Alcohol use: Yes     Alcohol/week: 2.0 standard drinks of alcohol     Types: 2 Shots of liquor per week     Comment: socially    Drug use: Yes     Types: Marijuana    Sexual activity: Yes     Partners: Male     Birth control/protection: OCP   Other Topics Concern    Not on file   Social History Narrative    12th grade     Social Drivers of Health     Financial Resource Strain: Not on file   Food Insecurity: Not on file   Transportation Needs: Not on file   Physical Activity: Not on file   Stress: Not on file   Social Connections: Not on file   Intimate Partner Violence: Not on file   Housing  Stability: Not on file     Social History     Social History Narrative    12th grade       Objective:     Mental status:  Appearance calm and cooperative , adequate hygiene and grooming, and good eye contact    Mood depressed and anxious   Affect affect appropriate    Speech a normal rate   Thought Processes normal thought processes   Hallucinations no hallucinations present    Thought Content no delusions   Abnormal Thoughts no suicidal thoughts  and no homicidal thoughts    Orientation  oriented to person and place and time   Remote Memory short term memory intact   Attention Span concentration intact   Intellect Appears to be of Average Intelligence   Insight Insight intact   Judgement judgment was intact   Muscle Strength Muscle strength and tone were normal   Language no difficulty naming common objects   Fund of Knowledge displays adequate knowledge of current events   Pain moderate to severe   Pain Scale 5       Assessment/Plan:       Diagnoses and all orders for this visit:    Moderate episode of recurrent major depressive disorder (HCC)    Generalized anxiety disorder    LUZMARIA (generalized anxiety disorder)  -     ALPRAZolam (XANAX) 0.5 mg tablet; Take 1 tablet (0.5 mg total) by mouth 2 (two) times a day as needed for anxiety    Severe major depressive disorder (HCC)    Other orders  -     Diclofenac Sodium (VOLTAREN) 1 %; Apply 4 g topically 4 (four) times a day          Treatment Recommendations- Risks Benefits      Immediate Medical/Psychiatric/Psychotherapy Treatments and Any Precautions: Continue treatment plan discontinue Latuda    Risks, Benefits And Possible Side Effects Of Medications:  {PSYCH RISK, BENEFITS AND POSSIBLE SIDE EFFECTS (Optional):28794    Controlled Medication Discussion: She is aware of safe use and storage of medication    Psychotherapy Provided: 30 minutes individual psychotherapy provided.   Supportive therapy  Medication evaluation  Mood assessment  Normalized and validated her  feelings  Safety plan updated   Depression Follow-up Plan Completed: Yes  Call support people  Call undersigned  Call [phone]  Go to ED if necessary  Goals discussed in session: Maintain stable mood

## 2025-03-01 DIAGNOSIS — K21.9 GASTROESOPHAGEAL REFLUX DISEASE, UNSPECIFIED WHETHER ESOPHAGITIS PRESENT: ICD-10-CM

## 2025-03-01 DIAGNOSIS — F43.21 ADJUSTMENT DISORDER WITH DEPRESSED MOOD: ICD-10-CM

## 2025-03-03 RX ORDER — LAMOTRIGINE 200 MG/1
200 TABLET ORAL DAILY
Qty: 30 TABLET | Refills: 2 | Status: SHIPPED | OUTPATIENT
Start: 2025-03-03

## 2025-03-20 DIAGNOSIS — F41.1 GAD (GENERALIZED ANXIETY DISORDER): ICD-10-CM

## 2025-03-21 ENCOUNTER — OFFICE VISIT (OUTPATIENT)
Dept: URGENT CARE | Facility: CLINIC | Age: 25
End: 2025-03-21
Payer: COMMERCIAL

## 2025-03-21 VITALS
WEIGHT: 246.8 LBS | RESPIRATION RATE: 16 BRPM | TEMPERATURE: 98.8 F | DIASTOLIC BLOOD PRESSURE: 96 MMHG | SYSTOLIC BLOOD PRESSURE: 138 MMHG | BODY MASS INDEX: 42.36 KG/M2 | OXYGEN SATURATION: 96 % | HEART RATE: 95 BPM

## 2025-03-21 DIAGNOSIS — J45.41 MODERATE PERSISTENT ASTHMATIC BRONCHITIS WITH ACUTE EXACERBATION: Primary | ICD-10-CM

## 2025-03-21 PROCEDURE — 99213 OFFICE O/P EST LOW 20 MIN: CPT | Performed by: PREVENTIVE MEDICINE

## 2025-03-21 RX ORDER — AZITHROMYCIN 250 MG/1
TABLET, FILM COATED ORAL
Qty: 6 TABLET | Refills: 0 | Status: SHIPPED | OUTPATIENT
Start: 2025-03-21 | End: 2025-03-25

## 2025-03-21 RX ORDER — PREDNISONE 10 MG/1
TABLET ORAL
Qty: 25 TABLET | Refills: 0 | Status: SHIPPED | OUTPATIENT
Start: 2025-03-21

## 2025-03-21 NOTE — TELEPHONE ENCOUNTER
Request generated through [employer]. Medication is PRN. Forwarding to primary provider for review upon return.

## 2025-03-21 NOTE — PROGRESS NOTES
"  [facility]        NAME: Myriam Smith is a 24 y.o. female  : 2000    MRN: [id number]  DATE: 2025  TIME: 10:47 AM    Assessment and Plan   Moderate persistent asthmatic bronchitis with acute exacerbation [J45.41]  1. Moderate persistent asthmatic bronchitis with acute exacerbation  azithromycin (ZITHROMAX) 250 mg tablet    predniSONE 10 mg tablet            Patient Instructions       Follow up with PCP in 3-5 days.  Proceed to  ER if symptoms worsen.    If tests have been performed at [facility], our office will contact you with results if changes need to be made to the care plan discussed with you at the visit.  You can [facility] on [facility].    Chief Complaint     Chief Complaint   Patient presents with    Cough     Reports \"horrible\" headache, sore throat, cough with chest congestion and mucus, dyspnea x 10 days. Body aches improved. Using cough medicine, acetaminophen and ibuprofen.         History of Present Illness       Increased cough and congestion x 1 week.    Cough  Associated symptoms include wheezing. Pertinent negatives include no fever.       Review of Systems   Review of Systems   Constitutional:  Negative for fever.   HENT:  Positive for congestion.    Respiratory:  Positive for cough and wheezing.          Current Medications       Current Outpatient Medications:     albuterol (PROVENTIL HFA,VENTOLIN HFA) 90 mcg/act inhaler, Inhale 1-2 puffs as needed for wheezing, Disp: 18 g, Rfl: 0    ALPRAZolam (XANAX) 0.5 mg tablet, Take 1 tablet (0.5 mg total) by mouth 2 (two) times a day as needed for anxiety, Disp: 60 tablet, Rfl: 0    azithromycin (ZITHROMAX) 250 mg tablet, Take 2 tablets today then 1 tablet daily x 4 days, Disp: 6 tablet, Rfl: 0    DULoxetine (CYMBALTA) 60 mg delayed release capsule, Take 1 capsule (60 mg total) by mouth daily, Disp: 30 capsule, Rfl: 3    esomeprazole (NexIUM) 20 mg capsule, TAKE ONE CAPSULE BY MOUTH EVERY DAY IN THE " EARLY MORNING (GENERIC FOR NEXIUM), Disp: 30 capsule, Rfl: 5    ferrous sulfate 325 (65 FE) MG EC tablet, Take 325 mg by mouth 2 (two) times a day, Disp: , Rfl:     lamoTRIgine (LaMICtal) 200 MG tablet, TAKE ONE TABLET BY MOUTH EVERY DAY, Disp: 30 tablet, Rfl: 2    norgestimate-ethinyl estradiol (ORTHO-CYCLEN) 0.25-35 MG-MCG per tablet, TAKE ONE TABLET BY MOUTH EVERY DAY, Disp: 84 tablet, Rfl: 3    predniSONE 10 mg tablet, 5 tablets/day, Disp: 25 tablet, Rfl: 0    Diclofenac Sodium (VOLTAREN) 1 %, Apply 4 g topically 4 (four) times a day (Patient not taking: Reported on 3/21/2025), Disp: , Rfl:     Current Allergies     Allergies as of 03/21/2025    (No Known Allergies)            The following portions of the patient's history were reviewed and updated as appropriate: allergies, current medications, past family history, past medical history, past social history, past surgical history and problem list.     Past Medical History:   Diagnosis Date    Anemia     Anxiety     Asthma     Depression     Head injury     High triglycerides     total triglyceride 187 repeat normal 70    Psychiatric disorder     Type 2 diabetes mellitus with hyperglycemia, without long-term current use of insulin ([facility]) 06/21/2021    Started on metformin  mg bid, followed up by endocrine       Past Surgical History:   Procedure Laterality Date    KNEE ARTHROSCOPY Right 2015    KNEE ARTHROSCOPY Left 2013    KNEE ARTHROSCOPY Left 2017    KNEE SURGERY      UPPER GASTROINTESTINAL ENDOSCOPY         Family History   Problem Relation Age of Onset    Anxiety disorder Mother     Depression Mother     Asthma Mother     Crohn's disease Mother     Bipolar disorder Mother     Cervical cancer Mother     Hypertension Maternal Grandmother     Depression Maternal Grandfather     Alcohol abuse Paternal Grandfather     Depression Maternal Aunt     Anxiety disorder Maternal Aunt     Anxiety disorder Maternal Uncle     Breast cancer Family          Medications  have been verified.        Objective   /96   Pulse 95   Temp 98.8 °F (37.1 °C) (Tympanic)   Resp 16   Wt 112 kg (246 lb 12.8 oz)   LMP 03/03/2025 (Exact Date)   SpO2 96%   BMI 42.36 kg/m²   Patient's last menstrual period was 03/03/2025 (exact date).       Physical Exam     Physical Exam  HENT:      Mouth/Throat:      Mouth: Mucous membranes are moist.      Pharynx: Oropharynx is clear.   Pulmonary:      Breath sounds: Normal breath sounds.      Comments: Inspiratory and expiratory wheezes and prolonged expiration throughout the lung fields.  Poor air movement in the bases.

## 2025-03-23 RX ORDER — ALPRAZOLAM 0.5 MG
TABLET ORAL
Qty: 60 TABLET | Refills: 0 | Status: SHIPPED | OUTPATIENT
Start: 2025-03-23

## 2025-03-31 DIAGNOSIS — F33.1 MODERATE EPISODE OF RECURRENT MAJOR DEPRESSIVE DISORDER (HCC): ICD-10-CM

## 2025-03-31 DIAGNOSIS — F41.1 GENERALIZED ANXIETY DISORDER: ICD-10-CM

## 2025-03-31 RX ORDER — DULOXETIN HYDROCHLORIDE 60 MG/1
CAPSULE, DELAYED RELEASE ORAL
Qty: 60 CAPSULE | Refills: 3 | Status: SHIPPED | OUTPATIENT
Start: 2025-03-31

## 2025-04-03 ENCOUNTER — OFFICE VISIT (OUTPATIENT)
Dept: FAMILY MEDICINE CLINIC | Facility: CLINIC | Age: 25
End: 2025-04-03
Payer: COMMERCIAL

## 2025-04-03 VITALS
BODY MASS INDEX: 42.85 KG/M2 | SYSTOLIC BLOOD PRESSURE: 132 MMHG | TEMPERATURE: 97 F | RESPIRATION RATE: 18 BRPM | WEIGHT: 251 LBS | DIASTOLIC BLOOD PRESSURE: 80 MMHG | OXYGEN SATURATION: 98 % | HEART RATE: 86 BPM | HEIGHT: 64 IN

## 2025-04-03 DIAGNOSIS — G44.209 TENSION HEADACHE: Primary | ICD-10-CM

## 2025-04-03 PROCEDURE — 99213 OFFICE O/P EST LOW 20 MIN: CPT | Performed by: NURSE PRACTITIONER

## 2025-04-03 NOTE — ASSESSMENT & PLAN NOTE
Discussed headache prophylaxis.  Reiterated the importance of lifestyle changes including healthy diet with avoidance of processed foods, routine exercise, and proper sleep hygiene.  Given her psychiatric medications, would need to have clearance from her psychiatrist to start either amitriptyline or Topamax.  Given potential interactions, would prefer amitriptyline.  She has a follow-up next week and will discuss this.  Could also consider gabapentin

## 2025-04-03 NOTE — PATIENT INSTRUCTIONS
Magnesium glycinate daily  -check with your psychiatrist about taking Amitriptyline 10mg daily for headache prevention.  If okay, let me know I and can send the prescription

## 2025-04-03 NOTE — PROGRESS NOTES
"Name: Myriam Smith      : 2000      MRN: [id number]  Encounter Provider: RAOUL Haddad  Encounter Date: 4/3/2025   Encounter department: [facility]  :  Assessment & Plan  Tension headache  Discussed headache prophylaxis.  Reiterated the importance of lifestyle changes including healthy diet with avoidance of processed foods, routine exercise, and proper sleep hygiene.  Given her psychiatric medications, would need to have clearance from her psychiatrist to start either amitriptyline or Topamax.  Given potential interactions, would prefer amitriptyline.  She has a follow-up next week and will discuss this.  Could also consider gabapentin              History of Present Illness   Following up on her headaches.  Still getting these frequently, 3-4 times per week.  Was previously prescribed Fioricet for breakthrough headaches, which was not effective.  Still no identifiable triggers.  Usually always occur left temporal frontal region.  Sometimes photosensitive with severe headaches.  No associated auras.      Review of Systems    Objective   /80   Pulse 86   Temp (!) 97 °F (36.1 °C)   Resp 18   Ht 5' 4\" (1.626 m)   Wt 114 kg (251 lb)   LMP 2025 (Exact Date)   SpO2 98%   BMI 43.08 kg/m²      Physical Exam  Vitals and nursing note reviewed.   Constitutional:       General: She is not in acute distress.     Appearance: Normal appearance.   HENT:      Head: Normocephalic and atraumatic.   Eyes:      Conjunctiva/sclera: Conjunctivae normal.   Neck:      Vascular: No carotid bruit.   Cardiovascular:      Rate and Rhythm: Normal rate and regular rhythm.      Pulses: Normal pulses.      Heart sounds: Normal heart sounds. No murmur heard.  Pulmonary:      Effort: Pulmonary effort is normal.      Breath sounds: Normal breath sounds.   Skin:     General: Skin is warm and dry.   Neurological:      Mental Status: She is alert.   Psychiatric:         Mood and Affect: Mood " normal.         Behavior: Behavior normal.

## 2025-04-07 ENCOUNTER — OFFICE VISIT (OUTPATIENT)
Dept: PSYCHIATRY | Facility: CLINIC | Age: 25
End: 2025-04-07
Payer: COMMERCIAL

## 2025-04-07 ENCOUNTER — TELEPHONE (OUTPATIENT)
Dept: PSYCHIATRY | Facility: CLINIC | Age: 25
End: 2025-04-07

## 2025-04-07 VITALS
DIASTOLIC BLOOD PRESSURE: 86 MMHG | SYSTOLIC BLOOD PRESSURE: 111 MMHG | BODY MASS INDEX: 43.02 KG/M2 | HEIGHT: 64 IN | HEART RATE: 107 BPM | WEIGHT: 252 LBS

## 2025-04-07 DIAGNOSIS — F32.2 SEVERE MAJOR DEPRESSIVE DISORDER (HCC): ICD-10-CM

## 2025-04-07 DIAGNOSIS — F41.1 GENERALIZED ANXIETY DISORDER: Primary | ICD-10-CM

## 2025-04-07 DIAGNOSIS — F39 MOOD DISORDER (HCC): ICD-10-CM

## 2025-04-07 PROCEDURE — 99215 OFFICE O/P EST HI 40 MIN: CPT | Performed by: NURSE PRACTITIONER

## 2025-04-07 PROCEDURE — 90836 PSYTX W PT W E/M 45 MIN: CPT | Performed by: NURSE PRACTITIONER

## 2025-04-07 RX ORDER — CELECOXIB 200 MG/1
200 CAPSULE ORAL DAILY
COMMUNITY
Start: 2025-04-02 | End: 2025-04-16
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
## 2025-04-07 NOTE — ASSESSMENT & PLAN NOTE
Reports mood has been more depression which is situational as described above. Denies jaswinder or hypomanic episodes

## 2025-04-07 NOTE — ASSESSMENT & PLAN NOTE
"Xanax helps control her anxiety, continues to vape THC and nicotine. She is trying to quit Nicotine; however, would like to focus on depression and motivation. Has a BF who is supportive; however, does not \"believe\" in metal health problems. Support given. Encouraged on-line support groups  "

## 2025-04-07 NOTE — ASSESSMENT & PLAN NOTE
"Reports depression, no SI. Not motivated, looking for jobs and not getting hired. Knee pain and possible surgery again may be needed is upsetting to her. 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 Discussed looking at job postings and look at jobs that are interesting to her. She reports eating and sleeping. Depression is from how she \"looks\" \"overweight\", \"not doing anything in career\", \"not having friends\". Encouraged to resume therapy. She agreed. Support was provided. Cymbalta and Lamictal have controled her mood.  "

## 2025-04-30 DIAGNOSIS — F41.1 GAD (GENERALIZED ANXIETY DISORDER): ICD-10-CM

## 2025-05-01 RX ORDER — ALPRAZOLAM 0.5 MG
TABLET ORAL
Qty: 60 TABLET | Refills: 0 | Status: SHIPPED | OUTPATIENT
Start: 2025-05-01

## 2025-05-27 DIAGNOSIS — F43.21 ADJUSTMENT DISORDER WITH DEPRESSED MOOD: ICD-10-CM

## 2025-05-28 RX ORDER — LAMOTRIGINE 200 MG/1
200 TABLET ORAL DAILY
Qty: 30 TABLET | Refills: 2 | Status: SHIPPED | OUTPATIENT
Start: 2025-05-28

## 2025-06-05 DIAGNOSIS — F41.1 GAD (GENERALIZED ANXIETY DISORDER): ICD-10-CM

## 2025-06-05 RX ORDER — ALPRAZOLAM 0.5 MG
TABLET ORAL
Qty: 60 TABLET | Refills: 0 | Status: SHIPPED | OUTPATIENT
Start: 2025-06-05

## 2025-06-09 DIAGNOSIS — N94.6 DYSMENORRHEA IN ADOLESCENT: ICD-10-CM

## 2025-06-09 DIAGNOSIS — N92.0 MENORRHAGIA WITH REGULAR CYCLE: ICD-10-CM

## 2025-06-10 RX ORDER — NORGESTIMATE AND ETHINYL ESTRADIOL 0.25-0.035
KIT ORAL
Qty: 84 TABLET | Refills: 0 | Status: SHIPPED | OUTPATIENT
Start: 2025-06-10

## 2025-07-12 DIAGNOSIS — F41.1 GAD (GENERALIZED ANXIETY DISORDER): ICD-10-CM

## 2025-07-14 RX ORDER — ALPRAZOLAM 0.5 MG
TABLET ORAL
Qty: 60 TABLET | Refills: 0 | Status: SHIPPED | OUTPATIENT
Start: 2025-07-14

## 2025-07-16 ENCOUNTER — TELEPHONE (OUTPATIENT)
Dept: PSYCHIATRY | Facility: CLINIC | Age: 25
End: 2025-07-16

## 2025-07-16 NOTE — TELEPHONE ENCOUNTER
This writer left message for patient advising her to contact the office to reschedule a no show appt from 7/1/25.  Advised that future refill requests will be denied without appt

## 2025-07-30 ENCOUNTER — TELEPHONE (OUTPATIENT)
Age: 25
End: 2025-07-30